# Patient Record
Sex: FEMALE | Race: WHITE | NOT HISPANIC OR LATINO | Employment: OTHER | URBAN - METROPOLITAN AREA
[De-identification: names, ages, dates, MRNs, and addresses within clinical notes are randomized per-mention and may not be internally consistent; named-entity substitution may affect disease eponyms.]

---

## 2017-05-23 DIAGNOSIS — M54.5 LOW BACK PAIN, UNSPECIFIED BACK PAIN LATERALITY, UNSPECIFIED CHRONICITY, WITH SCIATICA PRESENCE UNSPECIFIED: ICD-10-CM

## 2017-05-23 NOTE — TELEPHONE ENCOUNTER
Gabapentin- note on last rx that labs needed for further refills      Last Written Prescription Date: 12/14/2016  Last Quantity: 180, # refills: 0  Last Office Visit with G, P or [facility] prescribing provider: 2/23/2016       Creatinine   Date Value Ref Range Status   09/13/2016 0.67 0.52 - 1.04 mg/dL Final     Lab Results   Component Value Date    AST 16 09/13/2016     Lab Results   Component Value Date    ALT 17 09/13/2016     BP Readings from Last 3 Encounters:   08/04/16 110/62   07/07/16 100/62   06/16/16 120/74

## 2017-05-24 RX ORDER — GABAPENTIN 300 MG/1
CAPSULE ORAL
Qty: 180 CAPSULE | Refills: 0 | Status: SHIPPED | OUTPATIENT
Start: 2017-05-24 | End: 2017-12-04

## 2017-06-03 ENCOUNTER — APPOINTMENT (OUTPATIENT)
Dept: CT IMAGING | Facility: CLINIC | Age: 77
End: 2017-06-03
Attending: FAMILY MEDICINE
Payer: COMMERCIAL

## 2017-06-03 ENCOUNTER — HOSPITAL ENCOUNTER (EMERGENCY)
Facility: CLINIC | Age: 77
Discharge: HOME | End: 2017-06-03
Attending: FAMILY MEDICINE | Admitting: FAMILY MEDICINE
Payer: COMMERCIAL

## 2017-06-03 VITALS
BODY MASS INDEX: 21.16 KG/M2 | RESPIRATION RATE: 14 BRPM | TEMPERATURE: 98.6 F | SYSTOLIC BLOOD PRESSURE: 108 MMHG | WEIGHT: 112 LBS | DIASTOLIC BLOOD PRESSURE: 70 MMHG | HEART RATE: 79 BPM | OXYGEN SATURATION: 96 %

## 2017-06-03 DIAGNOSIS — H81.10 BPPV (BENIGN PAROXYSMAL POSITIONAL VERTIGO), UNSPECIFIED LATERALITY: ICD-10-CM

## 2017-06-03 PROCEDURE — 99284 EMERGENCY DEPT VISIT MOD MDM: CPT | Mod: 25 | Performed by: FAMILY MEDICINE

## 2017-06-03 PROCEDURE — 70450 CT HEAD/BRAIN W/O DYE: CPT

## 2017-06-03 PROCEDURE — 99282 EMERGENCY DEPT VISIT SF MDM: CPT | Mod: Z6 | Performed by: FAMILY MEDICINE

## 2017-06-03 RX ORDER — MECLIZINE HYDROCHLORIDE 25 MG/1
25 TABLET ORAL EVERY 6 HOURS PRN
Qty: 15 TABLET | Refills: 1 | Status: SHIPPED | OUTPATIENT
Start: 2017-06-03 | End: 2017-06-22

## 2017-06-03 ASSESSMENT — ENCOUNTER SYMPTOMS
FEVER: 0
DIZZINESS: 1
SLEEP DISTURBANCE: 1
NAUSEA: 1
DIARRHEA: 0
RHINORRHEA: 0
VOMITING: 0

## 2017-06-03 NOTE — ED AVS SNAPSHOT
[facility] Emergency Department    [address] [place] [address]    Phone:  [phone]    Fax:  [phone]                                       Coby Damon   MRN: [id number]    Department:  [facility] Emergency Department   Date of Visit:  6/3/2017           Patient Information     Date Of Birth          1940        Your diagnoses for this visit were:     BPPV (benign paroxysmal positional vertigo), unspecified laterality        You were seen by Justin Munoz MD.      Follow-up Information     Follow up with Bandar Will MD. Schedule an appointment as soon as possible for a visit in 5 days.    Specialty:  Family Practice    Why:  If not improving.    Contact information:    [facility]  [address] [place] [id number] [phone]          Discharge Instructions         Benign Paroxysmal Positional Vertigo  Benign paroxysmal positional vertigo (BPPV) is a problem with the inner ear. The inner ear contains the vestibular system. This system is what helps you keep your balance. BPPV causes a feeling of spinning. It is a common problem of the vestibular system.  Understanding the vestibular system  The vestibular system of the ear is made up of very tiny parts. They include the utricle, saccule, and semicircular canals. The utricle is a tiny organ that contains calcium crystals. In some people, the crystals can move into the semicircular canals. When this happens, the system no longer works as it should. This causes BPPV. Benign means it is not life-threatening. Paroxysmal means it happens suddenly. Positional means that it happens when you move your head. Vertigo is a feeling of spinning.  What causes BPPV?  Causes include injury to your head or neck. Other problems with the vestibular system may cause BPPV. In many people, the cause of BPPV is not known.  Symptoms of BPPV  You many have repeated feelings of spinning (vertigo).  The vertigo usually lasts less than 1 minute. Some movements, suchas rolling over in bed, can bring on vertigo.  Diagnosing BPPV  Your primary health care provider may diagnose and treat your BPPV. Or you may see an ear, nose, and throat doctor (otolaryngologist). In some cases, you may see a nervous system doctor (neurologist).  The health care provider will ask about your symptoms and your medical history. He or she will examine you. You may have hearing and balance tests. As part of the exam, your health care provider may have you move your head and body in certain ways. If you have BPPV, the movements can bring on vertigo. Your provider will also look for abnormal movements of your eyes. You may have other tests to check your vestibular or nervous systems.  Treatment for BPPV  Your health care provider may try to move the calcium crystals. This is done by having you move your head and neck in certain ways. This treatment is safe and often works well. You may also be told to do these movements at home. You may still have vertigo for a few weeks. Your health care provider will recheck your symptoms, usually in about a month. Special physical therapy may also be part of treatment.  In rare cases surgery may be needed for BPPV that does not go away.     When to call the health care provider  Call your health care provider right away if you have any of these:    Symptoms that do not go away with treatment    Symptoms that get worse    New symptoms        5656-9180 [employer]. [address] [place] [address]. All rights reserved. This information is not intended as a substitute for professional medical care. Always follow your healthcare professional's instructions.          24 Hour Appointment Hotline       To make an appointment at any [facility], call [phone] ([phone]). If you don't have a family doctor or clinic, we will help you find one. [facility] are  conveniently located to serve the needs of you and your family.             Review of your medicines      START taking        Dose / Directions Last dose taken    meclizine 25 MG tablet   Commonly known as:  ANTIVERT   Dose:  25 mg   Quantity:  15 tablet        Take 1 tablet (25 mg) by mouth every 6 hours as needed for dizziness   Refills:  1          Our records show that you are taking the medicines listed below. If these are incorrect, please call your family doctor or clinic.        Dose / Directions Last dose taken    acetaminophen 325 MG tablet   Commonly known as:  TYLENOL   Dose:  325-650 mg        Take 325-650 mg by mouth every 6 hours as needed   Refills:  0        ALEVE PO   Dose:  220 mg        Take 220 mg by mouth   Refills:  0        gabapentin 300 MG capsule   Commonly known as:  NEURONTIN   Quantity:  180 capsule        TAKE ONE CAPSULE BY MOUTH TWICE DAILY NEEDS  LABS  FOR  FURTHER  REFILLS   Refills:  0        ibuprofen 600 MG tablet   Commonly known as:  ADVIL/MOTRIN        Take by mouth every 6 hours as needed   Refills:  0        lisinopril 10 MG tablet   Commonly known as:  PRINIVIL/ZESTRIL   Quantity:  90 tablet        TAKE ONE TABLET BY MOUTH ONCE DAILY   Refills:  2        MULTIVITAMIN PO        Refills:  0        order for DME   Quantity:  1 Device        Equipment being ordered: med cam boot   Refills:  0        vitamin D 2000 UNITS tablet   Dose:  2000 Units        Take 2,000 Units by mouth daily   Refills:  0                Prescriptions were sent or printed at these locations (1 Prescription)                   [facility] [address] [place] [address] [address]   [address] [facility] [address]    Telephone:  [phone]   Fax:  [phone]   Hours:                  E-Prescribed (1 of 1)         meclizine (ANTIVERT) 25 MG tablet                Procedures and tests performed during your visit     CT Head w/o Contrast      Orders Needing Specimen Collection     None      Pending  "Results     No orders found from 2017 to 2017.            Pending Culture Results     No orders found from 2017 to 2017.            Pending Results Instructions     If you had any lab results that were not finalized at the time of your Discharge, you can call the ED Lab Result RN at [phone]. You will be contacted by this team for any positive Lab results or changes in treatment. The nurses are available 7 days a week from 10A to 6:30P.  You can leave a message 24 hours per day and they will return your call.        Thank you for choosing [facility]       Thank you for choosing [facility] for your care. Our goal is always to provide you with excellent care. Hearing back from our patients is one way we can continue to improve our services. Please take a few minutes to complete the written survey that you may receive in the mail after you visit with us. Thank you!        [employer] Information     [email or web address] lets you send messages to your doctor, view your test results, renew your prescriptions, schedule appointments and more. To sign up, go to [email or web address] . Click on \"Log in\" on the left side of the screen, which will take you to the Welcome page. Then click on \"Sign up Now\" on the right side of the page.     You will be asked to enter the access code listed below, as well as some personal information. Please follow the directions to create your username and password.     Your access code is: [email or web address]  Expires: 2017 11:39 AM     Your access code will  in 90 days. If you need help or a new code, please call your [facility] clinic or [phone].        Care EveryWhere ID     This is your Care EveryWhere ID. This could be used by other organizations to access your [facility] medical records  [id number]        After Visit Summary       This is your record. Keep this with you and show to your community pharmacist(s) and doctor(s) at your next visit.                  "

## 2017-06-03 NOTE — ED AVS SNAPSHOT
[facility] Emergency Department    [address] [place] [address]    Phone:  [phone]    Fax:  [phone]                                       Coby Damon   MRN: [id number]    Department:  [facility] Emergency Department   Date of Visit:  6/3/2017           After Visit Summary Signature Page     I have received my discharge instructions, and my questions have been answered. I have discussed any challenges I see with this plan with the nurse or doctor.    ..........................................................................................................................................  Patient/Patient Representative Signature      ..........................................................................................................................................  Patient Representative Print Name and Relationship to Patient    ..................................................               ................................................  Date                                            Time    ..........................................................................................................................................  Reviewed by Signature/Title    ...................................................              ..............................................  Date                                                            Time

## 2017-06-03 NOTE — DISCHARGE INSTRUCTIONS
Benign Paroxysmal Positional Vertigo  Benign paroxysmal positional vertigo (BPPV) is a problem with the inner ear. The inner ear contains the vestibular system. This system is what helps you keep your balance. BPPV causes a feeling of spinning. It is a common problem of the vestibular system.  Understanding the vestibular system  The vestibular system of the ear is made up of very tiny parts. They include the utricle, saccule, and semicircular canals. The utricle is a tiny organ that contains calcium crystals. In some people, the crystals can move into the semicircular canals. When this happens, the system no longer works as it should. This causes BPPV. Benign means it is not life-threatening. Paroxysmal means it happens suddenly. Positional means that it happens when you move your head. Vertigo is a feeling of spinning.  What causes BPPV?  Causes include injury to your head or neck. Other problems with the vestibular system may cause BPPV. In many people, the cause of BPPV is not known.  Symptoms of BPPV  You many have repeated feelings of spinning (vertigo). The vertigo usually lasts less than 1 minute. Some movements, suchas rolling over in bed, can bring on vertigo.  Diagnosing BPPV  Your primary health care provider may diagnose and treat your BPPV. Or you may see an ear, nose, and throat doctor (otolaryngologist). In some cases, you may see a nervous system doctor (neurologist).  The health care provider will ask about your symptoms and your medical history. He or she will examine you. You may have hearing and balance tests. As part of the exam, your health care provider may have you move your head and body in certain ways. If you have BPPV, the movements can bring on vertigo. Your provider will also look for abnormal movements of your eyes. You may have other tests to check your vestibular or nervous systems.  Treatment for BPPV  Your health care provider may try to move the calcium crystals. This is done  by having you move your head and neck in certain ways. This treatment is safe and often works well. You may also be told to do these movements at home. You may still have vertigo for a few weeks. Your health care provider will recheck your symptoms, usually in about a month. Special physical therapy may also be part of treatment.  In rare cases surgery may be needed for BPPV that does not go away.     When to call the health care provider  Call your health care provider right away if you have any of these:    Symptoms that do not go away with treatment    Symptoms that get worse    New symptoms        [phone] [employer]. [address] [place] [address], [place] [address]. All rights reserved. This information is not intended as a substitute for professional medical care. Always follow your healthcare professional's instructions.

## 2017-06-03 NOTE — ED PROVIDER NOTES
History     Chief Complaint   Patient presents with     Dizziness     The history is provided by the patient.     Coby Damon is a 77 year old female who presents to the emergency department with dizziness.  Patient was hit on the head about a week ago when a lamp fell off a table.  Patient states that she only gets the dizziness during the night when she wakes up. Patient becomes nauseated and feels like the room is spinning when she wakes up at night.  She states that the dizziness goes away after awhile when she gets up. This dizziness started on Tuesday night.  Patient reports that the dizziness has gotten better since Tuesday, she thinks it is because she props herself up in bed at night. Patient denies ringing in ears, cold symptoms, fever, vomiting, and diarrhea.      I have reviewed the Medications, Allergies, Past Medical and Surgical History, and Social History in the Epic system.    Patient Active Problem List   Diagnosis     CARDIOVASCULAR SCREENING; LDL GOAL LESS THAN 130     Hypertension goal BP (blood pressure) < 140/90     Senile osteoporosis     Hyperlipidemia LDL goal <130     Cervicalgia     Low back pain, unspecified back pain laterality, unspecified chronicity, with sciatica presence unspecified     Past Medical History:   Diagnosis Date     Breast cancer (H) 2004    lumpectomy and 33 lymph nodes removed.     Shingles        Past Surgical History:   Procedure Laterality Date     COLONOSCOPY N/A 12/12/2014    Procedure: COLONOSCOPY;  Surgeon: Bandar Guidry MD;  Location:  GI     HYSTERECTOMY, PAP NO LONGER INDICATED       INJECT EPIDURAL CERVICAL N/A 9/23/2015    Procedure: INJECT EPIDURAL CERVICAL;  Surgeon: Sawyer Webster MD;  Location:  OR       No family history on file.    Social History   Substance Use Topics     Smoking status: Never Smoker     Smokeless tobacco: Never Used     Alcohol use No        Immunization History   Administered Date(s) Administered     Influenza  (High Dose) 3 valent vaccine 10/22/2013, 09/29/2015, 11/16/2016     Influenza Vaccine IM 3yrs+ 4 Valent IIV4 10/07/2014     TDAP Vaccine (Boostrix) 05/23/2016        No Known Allergies    Current Outpatient Prescriptions   Medication Sig Dispense Refill     gabapentin (NEURONTIN) 300 MG capsule TAKE ONE CAPSULE BY MOUTH TWICE DAILY NEEDS  LABS  FOR  FURTHER  REFILLS 180 capsule 0     lisinopril (PRINIVIL/ZESTRIL) 10 MG tablet TAKE ONE TABLET BY MOUTH ONCE DAILY 90 tablet 2     Naproxen Sodium (ALEVE PO) Take 220 mg by mouth       Cholecalciferol (VITAMIN D) 2000 UNITS tablet Take 2,000 Units by mouth daily       ibuprofen (ADVIL,MOTRIN) 600 MG tablet Take by mouth every 6 hours as needed       acetaminophen (TYLENOL) 325 MG tablet Take 325-650 mg by mouth every 6 hours as needed       Multiple Vitamins-Minerals (MULTIVITAMIN OR)        order for DME Equipment being ordered: med cam boot 1 Device 0       Review of Systems   Constitutional: Negative for fever.   HENT: Negative for congestion, ear pain (no ringing in ears) and rhinorrhea.    Gastrointestinal: Positive for nausea (at night). Negative for diarrhea and vomiting.   Neurological: Positive for dizziness (at night).   Psychiatric/Behavioral: Positive for sleep disturbance.   All other systems reviewed and are negative.      Physical Exam   BP: 122/55  Pulse: 79  Temp: 98.6  F (37  C)  Resp: 14  Weight: 50.8 kg (112 lb)  SpO2: 96 %  Physical Exam   Constitutional: She is oriented to person, place, and time. She appears well-developed and well-nourished.   HENT:   Head: Normocephalic and atraumatic.   Mouth/Throat: Oropharynx is clear and moist.   Eyes: Conjunctivae and EOM are normal.   Neck: Normal range of motion. Neck supple.   Musculoskeletal: Normal range of motion. She exhibits no edema.   Neurological: She is alert and oriented to person, place, and time.   Adonis-pik test negative.   Skin: Skin is warm and dry.   Psychiatric: She has a normal mood and  affect. Her behavior is normal.   Nursing note and vitals reviewed.      ED Course     ED Course     Procedures         Results for orders placed or performed during the hospital encounter of 06/03/17   CT Head w/o Contrast    Narrative    CT HEAD W/O CONTRAST  6/3/2017 11:05 AM    HISTORY:  dizziness, recent head injury    Radiation dose for this scan was reduced using automated exposure  control, adjustment of the mA and/or kV according to patient size, or  iterative reconstruction technique.    FINDINGS:  There is moderate cortical atrophy. There is no evidence of  acute intracranial hemorrhage. There is no mass effect or shift of the  midline. There is no definite CT evidence of acute stroke. The  visualized portions of the paranasal sinuses are clear. The osseous  calvarium appears within normal limits as imaged transversely.      Impression    IMPRESSION:  No evidence of acute intracranial hemorrhage or mass  effect.    JAE SIMMONS MD     CT scan of the head was negative.  I think she most likely has this dizziness from a possible inner ear cause.  This would explain why seems to be very positional.  I am unsure if the head trauma caused otolith to dislodge which is causing this vertigo like symptoms.  Many have patient try some meclizine at night to see if this slowly clears up on its own.  The meclizine should help the symptoms.  If there's no improvement by next week I will have her see her doctor, they may want to consider referral for physical therapy for the eply maneuver.    Assessments & Plan (with Medical Decision Making)  BPPV     I have reviewed the nursing notes.    I have reviewed the findings, diagnosis, plan and need for follow up with the patient.          This document serves as a record of services personally performed by Justin Munoz M.D. It was created on their behalf by Stuart Kim, a trained medical scribe. The creation of this record is based on the provider's personal  observations and the statements of the patient. This document has been checked and approved by the attending provider.    Note: Chart documentation done in part with Dragon Voice Recognition software. Although reviewed after completion, some word and grammatical errors may remain.    6/3/2017   [facility] EMERGENCY DEPARTMENT     Justin Munoz MD  06/03/17 1123

## 2017-06-07 ENCOUNTER — TELEPHONE (OUTPATIENT)
Dept: FAMILY MEDICINE | Facility: CLINIC | Age: 77
End: 2017-06-07

## 2017-06-07 NOTE — TELEPHONE ENCOUNTER
Per  please Triage. He states that she doesn't need to be seen for follow up cause we can set her up with PT and refills on med. Without being seen.  Marjorie Auguste MA

## 2017-06-07 NOTE — TELEPHONE ENCOUNTER
Patient reports that she is feeling better with the dramamine, but reports it causes her to be very drowsy.  She is wondering if there is something else she can take that will help with the dizziness and vertigo that will not make her so drowsy.  She is comfortable not following up in clinic, but is requesting new medication.     Jessica Ruvalcaba RN

## 2017-06-07 NOTE — TELEPHONE ENCOUNTER
Reason for Call:  Same Day Appointment, Requested Provider:  Dr. Will    PCP: Bandar Will    Reason for visit: work in-ED follow up, states she needs to be seen tomorrow    Duration of symptoms:     Have you been treated for this in the past? No    Additional comments:     Can we leave a detailed message on this number? YES    Phone number patient can be reached at: Home number on file [phone] (home)    Best Time: any    Call taken on 6/7/2017 at 10:26 AM by Jacqueline Ugarte

## 2017-06-07 NOTE — TELEPHONE ENCOUNTER
Per Dr. Will she can stop the dramamine and get OTC Zyrtec 10mg and take that daily. Patient notified. KB/JUAN ALBERTO

## 2017-06-07 NOTE — TELEPHONE ENCOUNTER
"From ED:  CT scan of the head was negative.  I think she most likely has this dizziness from a possible inner ear cause.  This would explain why seems to be very positional.  I am unsure if the head trauma caused otolith to dislodge which is causing this vertigo like symptoms.  Many have patient try some meclizine at night to see if this slowly clears up on its own.  The meclizine should help the symptoms.  If there's no improvement by next week I will have her see her doctor, they may want to consider referral for physical therapy for the eply maneuver.    RN has attempted to contact this patient by phone to return their call, but there is no response.  Left message to \"call clinic at earliest convenience\".  Will try again later.     Marjorie Gonsalez RN      "

## 2017-06-22 ENCOUNTER — OFFICE VISIT (OUTPATIENT)
Dept: FAMILY MEDICINE | Facility: CLINIC | Age: 77
End: 2017-06-22
Payer: COMMERCIAL

## 2017-06-22 VITALS
OXYGEN SATURATION: 92 % | HEART RATE: 64 BPM | TEMPERATURE: 97.7 F | BODY MASS INDEX: 21.34 KG/M2 | RESPIRATION RATE: 18 BRPM | DIASTOLIC BLOOD PRESSURE: 60 MMHG | SYSTOLIC BLOOD PRESSURE: 110 MMHG | WEIGHT: 113 LBS | HEIGHT: 61 IN

## 2017-06-22 DIAGNOSIS — H81.10 BPPV (BENIGN PAROXYSMAL POSITIONAL VERTIGO), UNSPECIFIED LATERALITY: Primary | ICD-10-CM

## 2017-06-22 DIAGNOSIS — I10 HYPERTENSION GOAL BP (BLOOD PRESSURE) < 140/90: ICD-10-CM

## 2017-06-22 PROCEDURE — 99214 OFFICE O/P EST MOD 30 MIN: CPT | Performed by: FAMILY MEDICINE

## 2017-06-22 RX ORDER — OMEGA-3 FATTY ACIDS/FISH OIL 300-1000MG
1 CAPSULE ORAL DAILY
Qty: 90 CAPSULE | COMMUNITY
Start: 2017-06-22 | End: 2021-01-25

## 2017-06-22 NOTE — PROGRESS NOTES
SUBJECTIVE:                                                    Coby Damon is a 77 year old female who presents to clinic today for the following health issues:      ED/UC Followup:    Facility:  [facility]  Date of visit: 6/3/17  Reason for visit: Vertigo  Current Status: Improved           Problem list and histories reviewed & adjusted, as indicated.  Additional history: as documented        Reviewed and updated as needed this visit by clinical staff       Reviewed and updated as needed this visit by Provider        SUBJECTIVE:  Coby  is a 77 year old female who presents for:  Follow-up of emergency room visit for vertigo. She developed just at night she noticed turning in bed things were spinning. This was about a week after she was moving an end table and a lamp fell over and hit her in the head. She did not lose consciousness. Had a contusion there but went away. In the emergency room CT scan was done of the head showed no acute changes. Diagnosed with positional vertigo. Recommended rest and meclizine. She has a history of hypertension and low back pain and a history of shingles. She does take antihypertensive medication and gabapentin. But has had no changes in these medications recently. She is feeling better.    Past Medical History:   Diagnosis Date     Breast cancer (H) 2004    lumpectomy and 33 lymph nodes removed.     Shingles      Past Surgical History:   Procedure Laterality Date     COLONOSCOPY N/A 12/12/2014    Procedure: COLONOSCOPY;  Surgeon: Bandar Guidry MD;  Location:  GI     HYSTERECTOMY, PAP NO LONGER INDICATED       INJECT EPIDURAL CERVICAL N/A 9/23/2015    Procedure: INJECT EPIDURAL CERVICAL;  Surgeon: Sawyer Webster MD;  Location:  OR     Social History   Substance Use Topics     Smoking status: Never Smoker     Smokeless tobacco: Never Used     Alcohol use No     Current Outpatient Prescriptions   Medication Sig Dispense Refill     omega 3 1000 MG CAPS Take  "1 g by mouth daily 90 capsule      gabapentin (NEURONTIN) 300 MG capsule TAKE ONE CAPSULE BY MOUTH TWICE DAILY NEEDS  LABS  FOR  FURTHER  REFILLS 180 capsule 0     lisinopril (PRINIVIL/ZESTRIL) 10 MG tablet TAKE ONE TABLET BY MOUTH ONCE DAILY 90 tablet 2     Naproxen Sodium (ALEVE PO) Take 220 mg by mouth       order for DME Equipment being ordered: med cam boot 1 Device 0     Cholecalciferol (VITAMIN D) 2000 UNITS tablet Take 2,000 Units by mouth daily       ibuprofen (ADVIL,MOTRIN) 600 MG tablet Take by mouth every 6 hours as needed       acetaminophen (TYLENOL) 325 MG tablet Take 325-650 mg by mouth every 6 hours as needed       Multiple Vitamins-Minerals (MULTIVITAMIN OR)          REVIEW OF SYSTEMS:   5 point ROS negative except as noted above in HPI, including Gen., Resp, CV, GI &  system review.     OBJECTIVE:  Vitals: /60  Pulse 64  Temp 97.7  F (36.5  C) (Temporal)  Resp 18  Ht 5' 1\" (1.549 m)  Wt 113 lb (51.3 kg)  SpO2 92%  BMI 21.35 kg/m2  BMI= Body mass index is 21.35 kg/(m^2).  She is alert and oriented. In no distress. Her head is normocephalic. Eyes PERRLA. EOMs full no nystagmus. Ears are clear. Her neck with no carotid bruits. Lungs are clear. Heart was a regular rhythm. DTRs are 2 over 5 at the knees. Romberg is fairly steady. Gait is regular. Speech is regular. Face is symmetrical.    ASSESSMENT:  #1 vertigo #2 hypertension    PLAN:  Her blood pressure is good. CT scan of the head showed no acute changes. Use meclizine on a when necessary basis she does not like the side effects. She is getting better. Question of having her see physical therapy was entertained. I printed out printouts on positional vertigo and labyrinthitis for her to review for better understanding. We will do no further workup at this time.        Bandar Will MD  [facility]              "

## 2017-06-22 NOTE — NURSING NOTE
"Chief Complaint   Patient presents with     Hospital F/U       Initial /60  Pulse 64  Temp 97.7  F (36.5  C) (Temporal)  Resp 18  Ht 5' 1\" (1.549 m)  Wt 113 lb (51.3 kg)  SpO2 92%  BMI 21.35 kg/m2 Estimated body mass index is 21.35 kg/(m^2) as calculated from the following:    Height as of this encounter: 5' 1\" (1.549 m).    Weight as of this encounter: 113 lb (51.3 kg).  Medication Reconciliation: complete    "

## 2017-06-22 NOTE — MR AVS SNAPSHOT
"              After Visit Summary   2017    Coby Damon    MRN: [id number]           Patient Information     Date Of Birth          1940        Visit Information        Provider Department      2017 8:20 AM Bandar Will MD [facility]        Today's Diagnoses     BPPV (benign paroxysmal positional vertigo), unspecified laterality    -  1    Hypertension goal BP (blood pressure) < 140/90           Follow-ups after your visit        Who to contact     If you have questions or need follow up information about today's clinic visit or your schedule please contact [facility] directly at [phone].  Normal or non-critical lab and imaging results will be communicated to you by MyChart, letter or phone within 4 business days after the clinic has received the results. If you do not hear from us within 7 days, please contact the clinic through MyChart or phone. If you have a critical or abnormal lab result, we will notify you by phone as soon as possible.  Submit refill requests through Seeder or call your pharmacy and they will forward the refill request to us. Please allow 3 business days for your refill to be completed.          Additional Information About Your Visit        MyChart Information     Seeder lets you send messages to your doctor, view your test results, renew your prescriptions, schedule appointments and more. To sign up, go to [email or web address] . Click on \"Log in\" on the left side of the screen, which will take you to the Welcome page. Then click on \"Sign up Now\" on the right side of the page.     You will be asked to enter the access code listed below, as well as some personal information. Please follow the directions to create your username and password.     Your access code is: [email or web address]  Expires: 2017 11:39 AM     Your access code will  in 90 days. If you need help or a new code, please call your [facility] or " "[phone].        Care EveryWhere ID     This is your Care EveryWhere ID. This could be used by other organizations to access your [place] medical records  [id number]        Your Vitals Were     Pulse Temperature Respirations Height Pulse Oximetry BMI (Body Mass Index)    64 97.7  F (36.5  C) (Temporal) 18 5' 1\" (1.549 m) 92% 21.35 kg/m2       Blood Pressure from Last 3 Encounters:   06/22/17 110/60   06/03/17 108/70   08/04/16 110/62    Weight from Last 3 Encounters:   06/22/17 113 lb (51.3 kg)   06/03/17 112 lb (50.8 kg)   08/04/16 113 lb (51.3 kg)              Today, you had the following     No orders found for display       Primary Care Provider Office Phone # Fax #    Bandar Will [phone]       [place] [id number] [place] [address]        Equal Access to Services     HARRY MARTINEZ : Hadii aad ku hadasho Soomaali, waaxda luqadaha, qaybta kaalmada adeegyada, waxay idiin hayaan fidel abel . So [facility] [phone].    ATENCIÓN: Si habla español, tiene a nicholas disposición servicios gratuitos de asistencia lingüística. Llame al [phone].    We comply with applicable federal civil rights laws and [place] laws. We do not discriminate on the basis of race, color, national origin, age, disability sex, sexual orientation or gender identity.            Thank you!     Thank you for choosing [facility]  for your care. Our goal is always to provide you with excellent care. Hearing back from our patients is one way we can continue to improve our services. Please take a few minutes to complete the written survey that you may receive in the mail after your visit with us. Thank you!             Your Updated Medication List - Protect others around you: Learn how to safely use, store and throw away your medicines at www.disposemymeds.org.          This list is accurate as of: 6/22/17  9:00 AM.  Always use your most recent med list.             "       Brand Name Dispense Instructions for use Diagnosis    acetaminophen 325 MG tablet    TYLENOL     Take 325-650 mg by mouth every 6 hours as needed        ALEVE PO      Take 220 mg by mouth    Fracture of ankle, right, closed, initial encounter       gabapentin 300 MG capsule    NEURONTIN    180 capsule    TAKE ONE CAPSULE BY MOUTH TWICE DAILY NEEDS  LABS  FOR  FURTHER  REFILLS    Low back pain, unspecified back pain laterality, unspecified chronicity, with sciatica presence unspecified       ibuprofen 600 MG tablet    ADVIL/MOTRIN     Take by mouth every 6 hours as needed        lisinopril 10 MG tablet    PRINIVIL/ZESTRIL    90 tablet    TAKE ONE TABLET BY MOUTH ONCE DAILY    Hypertension goal BP (blood pressure) < 140/90       MULTIVITAMIN PO           omega 3 1000 MG Caps     90 capsule    Take 1 g by mouth daily        order for DME     1 Device    Equipment being ordered: med cam boot    Fracture of ankle, right, closed, initial encounter       vitamin D 2000 UNITS tablet      Take 2,000 Units by mouth daily

## 2017-09-12 ENCOUNTER — TELEPHONE (OUTPATIENT)
Dept: FAMILY MEDICINE | Facility: CLINIC | Age: 77
End: 2017-09-12

## 2017-09-12 DIAGNOSIS — I10 HYPERTENSION GOAL BP (BLOOD PRESSURE) < 140/90: ICD-10-CM

## 2017-09-12 NOTE — TELEPHONE ENCOUNTER
Lisinopril      Last Written Prescription Date: 12/13/2016  Last Fill Quantity: 90, # refills: 2  Last Office Visit with [facility], [facility] or [facility] prescribing provider: 6-22-17       Potassium   Date Value Ref Range Status   09/13/2016 4.2 3.4 - 5.3 mmol/L Final     Creatinine   Date Value Ref Range Status   09/13/2016 0.67 0.52 - 1.04 mg/dL Final     BP Readings from Last 3 Encounters:   06/22/17 110/60   06/03/17 108/70   08/04/16 110/62

## 2017-09-13 RX ORDER — LISINOPRIL 10 MG/1
10 TABLET ORAL DAILY
Qty: 30 TABLET | Refills: 0 | Status: SHIPPED | OUTPATIENT
Start: 2017-09-13 | End: 2017-09-22

## 2017-09-13 NOTE — TELEPHONE ENCOUNTER
Rx refilled per RN protocol.  1 month    Will forward to schedulers to schedule patient for OV.  Marjorie Gonsalez RN

## 2017-09-13 NOTE — TELEPHONE ENCOUNTER
Patient is out of this medication.  Please refill as soon as possible and let her know when this has been done.

## 2017-09-22 ENCOUNTER — OFFICE VISIT (OUTPATIENT)
Dept: FAMILY MEDICINE | Facility: CLINIC | Age: 77
End: 2017-09-22
Payer: COMMERCIAL

## 2017-09-22 VITALS
HEART RATE: 70 BPM | TEMPERATURE: 97.2 F | DIASTOLIC BLOOD PRESSURE: 56 MMHG | SYSTOLIC BLOOD PRESSURE: 100 MMHG | BODY MASS INDEX: 21.12 KG/M2 | WEIGHT: 111.8 LBS

## 2017-09-22 DIAGNOSIS — E78.5 HYPERLIPIDEMIA LDL GOAL <130: ICD-10-CM

## 2017-09-22 DIAGNOSIS — I10 HYPERTENSION GOAL BP (BLOOD PRESSURE) < 140/90: Primary | ICD-10-CM

## 2017-09-22 DIAGNOSIS — M54.5 LOW BACK PAIN, UNSPECIFIED BACK PAIN LATERALITY, UNSPECIFIED CHRONICITY, WITH SCIATICA PRESENCE UNSPECIFIED: ICD-10-CM

## 2017-09-22 PROBLEM — Z71.89 ADVANCED DIRECTIVES, COUNSELING/DISCUSSION: Status: ACTIVE | Noted: 2017-09-22

## 2017-09-22 LAB
ALBUMIN SERPL-MCNC: 3.9 G/DL (ref 3.4–5)
ALP SERPL-CCNC: 80 U/L (ref 40–150)
ALT SERPL W P-5'-P-CCNC: 18 U/L (ref 0–50)
ANION GAP SERPL CALCULATED.3IONS-SCNC: 9 MMOL/L (ref 3–14)
AST SERPL W P-5'-P-CCNC: 15 U/L (ref 0–45)
BILIRUB SERPL-MCNC: 0.5 MG/DL (ref 0.2–1.3)
BUN SERPL-MCNC: 20 MG/DL (ref 7–30)
CALCIUM SERPL-MCNC: 9.3 MG/DL (ref 8.5–10.1)
CHLORIDE SERPL-SCNC: 103 MMOL/L (ref 94–109)
CHOLEST SERPL-MCNC: 205 MG/DL
CO2 SERPL-SCNC: 29 MMOL/L (ref 20–32)
CREAT SERPL-MCNC: 0.75 MG/DL (ref 0.52–1.04)
GFR SERPL CREATININE-BSD FRML MDRD: 75 ML/MIN/1.7M2
GLUCOSE SERPL-MCNC: 87 MG/DL (ref 70–99)
HDLC SERPL-MCNC: 53 MG/DL
LDLC SERPL CALC-MCNC: 130 MG/DL
NONHDLC SERPL-MCNC: 152 MG/DL
POTASSIUM SERPL-SCNC: 4.4 MMOL/L (ref 3.4–5.3)
PROT SERPL-MCNC: 7.3 G/DL (ref 6.8–8.8)
SODIUM SERPL-SCNC: 141 MMOL/L (ref 133–144)
TRIGL SERPL-MCNC: 110 MG/DL

## 2017-09-22 PROCEDURE — 80053 COMPREHEN METABOLIC PANEL: CPT | Performed by: FAMILY MEDICINE

## 2017-09-22 PROCEDURE — 36415 COLL VENOUS BLD VENIPUNCTURE: CPT | Performed by: FAMILY MEDICINE

## 2017-09-22 PROCEDURE — 80061 LIPID PANEL: CPT | Performed by: FAMILY MEDICINE

## 2017-09-22 PROCEDURE — 99214 OFFICE O/P EST MOD 30 MIN: CPT | Performed by: FAMILY MEDICINE

## 2017-09-22 RX ORDER — LISINOPRIL 10 MG/1
10 TABLET ORAL DAILY
Qty: 90 TABLET | Refills: 3 | Status: SHIPPED | OUTPATIENT
Start: 2017-09-22 | End: 2018-10-11

## 2017-09-22 NOTE — PROGRESS NOTES
SUBJECTIVE:   Coby Damon is a 77 year old female who presents to clinic today for the following health issues:      Hypertension Follow-up      Outpatient blood pressures are not being checked.    Low Salt Diet: no added salt- very seldom, and not much        Amount of exercise or physical activity: yoga 2 times weekly    Problems taking medications regularly: No    Medication side effects: none  Diet: regular (no restrictions)      Medication Followup of gabapentn    Taking Medication as prescribed: yes    Side Effects:  None    Medication Helping Symptoms:  yes         Problem list and histories reviewed & adjusted, as indicated.  Additional history:         Reviewed and updated as needed this visit by clinical staff     Reviewed and updated as needed this visit by Provider        SUBJECTIVE:  Coby  is a 77 year old female who presents for:  Follow-up on her hypertension needs her lipids checked and is on gabapentin for low back pain with radiculopathy. She actually ran out of her blood pressure pills before they would refill them. Her blood pressure has been doing well. They only gave her 30 pills and she said they are different shape and size and was curious about that. Feeling fine and she had an episode of vertigo this summer that was quite disturbing.    Past Medical History:   Diagnosis Date     Breast cancer (H) 2004    lumpectomy and 33 lymph nodes removed.     Shingles      Past Surgical History:   Procedure Laterality Date     COLONOSCOPY N/A 12/12/2014    Procedure: COLONOSCOPY;  Surgeon: Bandar Guidry MD;  Location:  GI     HYSTERECTOMY, PAP NO LONGER INDICATED       INJECT EPIDURAL CERVICAL N/A 9/23/2015    Procedure: INJECT EPIDURAL CERVICAL;  Surgeon: Sawyer Webster MD;  Location:  OR     Social History   Substance Use Topics     Smoking status: Never Smoker     Smokeless tobacco: Never Used     Alcohol use No     Current Outpatient Prescriptions   Medication Sig Dispense  Refill     lisinopril (PRINIVIL/ZESTRIL) 10 MG tablet Take 1 tablet (10 mg) by mouth daily Appointment needed for additional refills. 30 tablet 0     omega 3 1000 MG CAPS Take 1 g by mouth daily 90 capsule      gabapentin (NEURONTIN) 300 MG capsule TAKE ONE CAPSULE BY MOUTH TWICE DAILY NEEDS  LABS  FOR  FURTHER  REFILLS (Patient taking differently: TAKE ONE CAPSULE BY MOUTH  DAILY NEEDS  LABS  FOR  FURTHER  REFILLS) 180 capsule 0     Naproxen Sodium (ALEVE PO) Take 220 mg by mouth       order for DME Equipment being ordered: med cam boot 1 Device 0     Cholecalciferol (VITAMIN D) 2000 UNITS tablet Take 2,000 Units by mouth daily       acetaminophen (TYLENOL) 325 MG tablet Take 325-650 mg by mouth every 6 hours as needed       Multiple Vitamins-Minerals (MULTIVITAMIN OR)        ibuprofen (ADVIL,MOTRIN) 600 MG tablet Take 200 mg by mouth every 6 hours as needed 2 tablets as needed         REVIEW OF SYSTEMS:   5 point ROS negative except as noted above in HPI, including Gen., Resp, CV, GI &  system review.     OBJECTIVE:  Vitals: /56  Pulse 70  Temp 97.2  F (36.2  C) (Tympanic)  Wt 111 lb 12.8 oz (50.7 kg)  BMI 21.12 kg/m2  BMI= Body mass index is 21.12 kg/(m^2).  She is well-appearing alert and oriented. Eyes PERRLA. Throat clear. Neck supple no thyromegaly no adenopathy. Lungs are clear to auscultation. Heart regular rhythm no murmur. Extremities with no edema. Skin clear.    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 She is not due for a gabapentin yet she is just taking it once at bedtime and feels this is helping. When it comes due we'll refill it. She is getting chemistry panel done today and a lipid panel and will notify her with the results and discuss use of any lipid-lowering medication if need be.            Bandar Will MD  [facility]

## 2017-09-22 NOTE — MR AVS SNAPSHOT
"              After Visit Summary   2017    Coby Damon    MRN: [id number]           Patient Information     Date Of Birth          1940        Visit Information        Provider Department      2017 8:40 AM Bandar Will MD [facility]        Today's Diagnoses     Hypertension goal BP (blood pressure) < 140/90    -  1    Hyperlipidemia LDL goal <130        Low back pain, unspecified back pain laterality, unspecified chronicity, with sciatica presence unspecified           Follow-ups after your visit        Who to contact     If you have questions or need follow up information about today's clinic visit or your schedule please contact [facility] directly at [phone].  Normal or non-critical lab and imaging results will be communicated to you by MyChart, letter or phone within 4 business days after the clinic has received the results. If you do not hear from us within 7 days, please contact the clinic through MyChart or phone. If you have a critical or abnormal lab result, we will notify you by phone as soon as possible.  Submit refill requests through Krowder or call your pharmacy and they will forward the refill request to us. Please allow 3 business days for your refill to be completed.          Additional Information About Your Visit        MyChart Information     Krowder lets you send messages to your doctor, view your test results, renew your prescriptions, schedule appointments and more. To sign up, go to [email or web address] . Click on \"Log in\" on the left side of the screen, which will take you to the Welcome page. Then click on \"Sign up Now\" on the right side of the page.     You will be asked to enter the access code listed below, as well as some personal information. Please follow the directions to create your username and password.     Your access code is: [email or web address]  Expires: 2017  9:25 AM     Your access code will  in 90 " days. If you need help or a new code, please call your [place] clinic or [phone].        Care EveryWhere ID     This is your Care EveryWhere ID. This could be used by other organizations to access your [place] medical records  [id number]        Your Vitals Were     Pulse Temperature BMI (Body Mass Index)             70 97.2  F (36.2  C) (Tympanic) 21.12 kg/m2          Blood Pressure from Last 3 Encounters:   09/22/17 100/56   06/22/17 110/60   06/03/17 108/70    Weight from Last 3 Encounters:   09/22/17 111 lb 12.8 oz (50.7 kg)   06/22/17 113 lb (51.3 kg)   06/03/17 112 lb (50.8 kg)              We Performed the Following     Comprehensive metabolic panel (BMP + Alb, Alk Phos, ALT, AST, Total. Bili, TP)     Lipid Profile          Today's Medication Changes          These changes are accurate as of: 9/22/17  9:25 AM.  If you have any questions, ask your nurse or doctor.               These medicines have changed or have updated prescriptions.        Dose/Directions    gabapentin 300 MG capsule   Commonly known as:  NEURONTIN   This may have changed:  additional instructions   Used for:  Low back pain, unspecified back pain laterality, unspecified chronicity, with sciatica presence unspecified        TAKE ONE CAPSULE BY MOUTH TWICE DAILY NEEDS  LABS  FOR  FURTHER  REFILLS   Quantity:  180 capsule   Refills:  0       lisinopril 10 MG tablet   Commonly known as:  PRINIVIL/ZESTRIL   This may have changed:  additional instructions   Used for:  Hypertension goal BP (blood pressure) < 140/90   Changed by:  Bandar Will MD        Dose:  10 mg   Take 1 tablet (10 mg) by mouth daily   Quantity:  90 tablet   Refills:  3            Where to get your medicines      These medications were sent to [facility] [address] [place] [address] [address]  [address] [facility] [address]     Phone:  [phone]     lisinopril 10 MG tablet                Primary Care Provider Office Phone # Fax #    Bandar Will  [phone]       [facility] [address] [place] [address]        Equal Access to Services     HARRY MARTINEZ : Hadii aad ku hadnancyletitia Tanjajaya, arnieserina kleinwilianha, elaina kaluli ramires, marxia calein hayaalucas bondswilton arroyo colten worthy. So [facility] [phone].    ATENCIÓN: Si habla español, tiene a nicholas disposición servicios gratuitos de asistencia lingüística. Llame al [phone].    We comply with applicable federal civil rights laws and [place] laws. We do not discriminate on the basis of race, color, national origin, age, disability sex, sexual orientation or gender identity.            Thank you!     Thank you for choosing [facility]  for your care. Our goal is always to provide you with excellent care. Hearing back from our patients is one way we can continue to improve our services. Please take a few minutes to complete the written survey that you may receive in the mail after your visit with us. Thank you!             Your Updated Medication List - Protect others around you: Learn how to safely use, store and throw away your medicines at www.disposemymeds.org.          This list is accurate as of: 9/22/17  9:25 AM.  Always use your most recent med list.                   Brand Name Dispense Instructions for use Diagnosis    acetaminophen 325 MG tablet    TYLENOL     Take 325-650 mg by mouth every 6 hours as needed        ALEVE PO      Take 220 mg by mouth    Fracture of ankle, right, closed, initial encounter       gabapentin 300 MG capsule    NEURONTIN    180 capsule    TAKE ONE CAPSULE BY MOUTH TWICE DAILY NEEDS  LABS  FOR  FURTHER  REFILLS    Low back pain, unspecified back pain laterality, unspecified chronicity, with sciatica presence unspecified       ibuprofen 600 MG tablet    ADVIL/MOTRIN     Take 200 mg by mouth every 6 hours as needed 2 tablets as needed        lisinopril 10 MG tablet    PRINIVIL/ZESTRIL    90 tablet    Take 1 tablet (10 mg) by  mouth daily    Hypertension goal BP (blood pressure) < 140/90       MULTIVITAMIN PO           omega 3 1000 MG Caps     90 capsule    Take 1 g by mouth daily        order for DME     1 Device    Equipment being ordered: med cam boot    Fracture of ankle, right, closed, initial encounter       vitamin D 2000 UNITS tablet      Take 2,000 Units by mouth daily

## 2017-09-22 NOTE — NURSING NOTE
"Chief Complaint   Patient presents with     Hypertension     Flu Shot       Initial There were no vitals taken for this visit. Estimated body mass index is 21.35 kg/(m^2) as calculated from the following:    Height as of 6/22/17: 5' 1\" (1.549 m).    Weight as of 6/22/17: 113 lb (51.3 kg).  Medication Reconciliation: complete  "

## 2017-09-28 NOTE — PROGRESS NOTES
Chemistry panel including blood sugar and liver function and kidney function tests were all normal  cholesterol was 205 with a LDL of 130 and HDL 53. These are okay just borderline up

## 2017-10-09 ENCOUNTER — DOCUMENTATION ONLY (OUTPATIENT)
Dept: OTHER | Facility: CLINIC | Age: 77
End: 2017-10-09

## 2017-10-09 PROBLEM — Z71.89 ADVANCED DIRECTIVES, COUNSELING/DISCUSSION: Chronic | Status: ACTIVE | Noted: 2017-09-22

## 2017-10-13 ENCOUNTER — ALLIED HEALTH/NURSE VISIT (OUTPATIENT)
Dept: FAMILY MEDICINE | Facility: CLINIC | Age: 77
End: 2017-10-13
Payer: COMMERCIAL

## 2017-10-13 DIAGNOSIS — Z23 NEED FOR PROPHYLACTIC VACCINATION AND INOCULATION AGAINST INFLUENZA: Primary | ICD-10-CM

## 2017-10-13 PROCEDURE — 90662 IIV NO PRSV INCREASED AG IM: CPT

## 2017-10-13 PROCEDURE — 90471 IMMUNIZATION ADMIN: CPT

## 2017-10-13 PROCEDURE — 99207 ZZC NO CHARGE NURSE ONLY: CPT

## 2017-10-13 NOTE — MR AVS SNAPSHOT
"              After Visit Summary   10/13/2017    Coby Damon    MRN: [id number]           Patient Information     Date Of Birth          1940        Visit Information        Provider Department      10/13/2017 1:15 PM NL FLOAT TEAM D [facility]        Today's Diagnoses     Need for prophylactic vaccination and inoculation against influenza    -  1       Follow-ups after your visit        Who to contact     If you have questions or need follow up information about today's clinic visit or your schedule please contact [facility] directly at [phone].  Normal or non-critical lab and imaging results will be communicated to you by [employer], letter or phone within 4 business days after the clinic has received the results. If you do not hear from us within 7 days, please contact the clinic through [employer] or phone. If you have a critical or abnormal lab result, we will notify you by phone as soon as possible.  Submit refill requests through [facility] or call your pharmacy and they will forward the refill request to us. Please allow 3 business days for your refill to be completed.          Additional Information About Your Visit        MyChart Information     [facility] lets you send messages to your doctor, view your test results, renew your prescriptions, schedule appointments and more. To sign up, go to [email or web address] . Click on \"Log in\" on the left side of the screen, which will take you to the Welcome page. Then click on \"Sign up Now\" on the right side of the page.     You will be asked to enter the access code listed below, as well as some personal information. Please follow the directions to create your username and password.     Your access code is: [email or web address]  Expires: 2017  9:25 AM     Your access code will  in 90 days. If you need help or a new code, please call your [facility] or [phone].        Care EveryWhere ID     This is your " Care EveryWhere ID. This could be used by other organizations to access your [facility] medical records  [id number]         Blood Pressure from Last 3 Encounters:   09/22/17 100/56   06/22/17 110/60   06/03/17 108/70    Weight from Last 3 Encounters:   09/22/17 111 lb 12.8 oz (50.7 kg)   06/22/17 113 lb (51.3 kg)   06/03/17 112 lb (50.8 kg)              We Performed the Following     FLU VACCINE, INCREASED ANTIGEN, PRESV FREE, AGE 65+ [29492]     Vaccine Administration, Initial [41869]          Today's Medication Changes          These changes are accurate as of: 10/13/17  1:42 PM.  If you have any questions, ask your nurse or doctor.               These medicines have changed or have updated prescriptions.        Dose/Directions    gabapentin 300 MG capsule   Commonly known as:  NEURONTIN   This may have changed:  additional instructions   Used for:  Low back pain, unspecified back pain laterality, unspecified chronicity, with sciatica presence unspecified        TAKE ONE CAPSULE BY MOUTH TWICE DAILY NEEDS  LABS  FOR  FURTHER  REFILLS   Quantity:  180 capsule   Refills:  0                Primary Care Provider Office Phone # Fax #    Bandar Will [phone]       [facility] [address] [place] [address]        Equal Access to Services     HARRY MARTINEZ AH: Hadii ervin ku hadasho Soomaali, waaxda luqadaha, qaybta kaalmada adeegyada, waxay rhianna worthy. So [facility] [phone].    ATENCIÓN: Si habla español, tiene a nicholas disposición servicios gratuitos de asistencia lingüística. Ella al [phone].    We comply with applicable federal civil rights laws and [place] laws. We do not discriminate on the basis of race, color, national origin, age, disability, sex, sexual orientation, or gender identity.            Thank you!     Thank you for choosing [facility]  for your care. Our goal is always to provide you with excellent care. Hearing  back from our patients is one way we can continue to improve our services. Please take a few minutes to complete the written survey that you may receive in the mail after your visit with us. Thank you!             Your Updated Medication List - Protect others around you: Learn how to safely use, store and throw away your medicines at www.disposemymeds.org.          This list is accurate as of: 10/13/17  1:42 PM.  Always use your most recent med list.                   Brand Name Dispense Instructions for use Diagnosis    acetaminophen 325 MG tablet    TYLENOL     Take 325-650 mg by mouth every 6 hours as needed        ALEVE PO      Take 220 mg by mouth    Fracture of ankle, right, closed, initial encounter       gabapentin 300 MG capsule    NEURONTIN    180 capsule    TAKE ONE CAPSULE BY MOUTH TWICE DAILY NEEDS  LABS  FOR  FURTHER  REFILLS    Low back pain, unspecified back pain laterality, unspecified chronicity, with sciatica presence unspecified       ibuprofen 600 MG tablet    ADVIL/MOTRIN     Take 200 mg by mouth every 6 hours as needed 2 tablets as needed        lisinopril 10 MG tablet    PRINIVIL/ZESTRIL    90 tablet    Take 1 tablet (10 mg) by mouth daily    Hypertension goal BP (blood pressure) < 140/90       MULTIVITAMIN PO           omega 3 1000 MG Caps     90 capsule    Take 1 g by mouth daily        order for DME     1 Device    Equipment being ordered: med cam boot    Fracture of ankle, right, closed, initial encounter       vitamin D 2000 UNITS tablet      Take 2,000 Units by mouth daily

## 2017-10-13 NOTE — PROGRESS NOTES
Injectable Influenza Immunization Documentation    1.  Is the person to be vaccinated sick today?   No    2. Does the person to be vaccinated have an allergy to a component   of the vaccine?   No    3. Has the person to be vaccinated ever had a serious reaction   to influenza vaccine in the past?   No    4. Has the person to be vaccinated ever had Guillain-Barré syndrome?   No    Form completed by Pat Ch [facility]

## 2017-11-21 ENCOUNTER — HOSPITAL ENCOUNTER (OUTPATIENT)
Dept: MAMMOGRAPHY | Facility: CLINIC | Age: 77
Discharge: HOME | End: 2017-11-21
Attending: FAMILY MEDICINE | Admitting: FAMILY MEDICINE
Payer: COMMERCIAL

## 2017-11-21 DIAGNOSIS — Z12.31 VISIT FOR SCREENING MAMMOGRAM: ICD-10-CM

## 2017-11-21 PROCEDURE — G0202 SCR MAMMO BI INCL CAD: HCPCS

## 2017-12-04 DIAGNOSIS — M54.5 LOW BACK PAIN, UNSPECIFIED BACK PAIN LATERALITY, UNSPECIFIED CHRONICITY, WITH SCIATICA PRESENCE UNSPECIFIED: ICD-10-CM

## 2017-12-04 RX ORDER — GABAPENTIN 300 MG/1
CAPSULE ORAL
Qty: 180 CAPSULE | Refills: 0 | Status: SHIPPED | OUTPATIENT
Start: 2017-12-04 | End: 2018-06-15

## 2017-12-04 NOTE — TELEPHONE ENCOUNTER
gabapentin (NEURONTIN) 300 MG capsule      Last Written Prescription Date:  5/24/17  Last Fill Quantity: 180,   # refills: 0  Last Office Visit: 9/22/17  Future Office visit:       Routing refill request to provider for review/approval because:  Drug not on the [facility], P or [facility] refill protocol or controlled substance

## 2018-04-23 ENCOUNTER — OFFICE VISIT (OUTPATIENT)
Dept: FAMILY MEDICINE | Facility: CLINIC | Age: 78
End: 2018-04-23
Payer: COMMERCIAL

## 2018-04-23 VITALS
RESPIRATION RATE: 16 BRPM | DIASTOLIC BLOOD PRESSURE: 66 MMHG | SYSTOLIC BLOOD PRESSURE: 102 MMHG | OXYGEN SATURATION: 96 % | WEIGHT: 113.25 LBS | BODY MASS INDEX: 22.23 KG/M2 | HEART RATE: 91 BPM | HEIGHT: 60 IN | TEMPERATURE: 99.2 F

## 2018-04-23 DIAGNOSIS — Z23 NEED FOR VACCINATION: Primary | ICD-10-CM

## 2018-04-23 DIAGNOSIS — M79.2 RADICULAR PAIN IN RIGHT ARM: ICD-10-CM

## 2018-04-23 DIAGNOSIS — M25.511 ACUTE PAIN OF RIGHT SHOULDER: ICD-10-CM

## 2018-04-23 PROCEDURE — 90670 PCV13 VACCINE IM: CPT | Performed by: FAMILY MEDICINE

## 2018-04-23 PROCEDURE — 90471 IMMUNIZATION ADMIN: CPT | Performed by: FAMILY MEDICINE

## 2018-04-23 PROCEDURE — 99213 OFFICE O/P EST LOW 20 MIN: CPT | Mod: 25 | Performed by: FAMILY MEDICINE

## 2018-04-23 ASSESSMENT — PAIN SCALES - GENERAL: PAINLEVEL: MILD PAIN (2)

## 2018-04-23 NOTE — NURSING NOTE
Светлана Herring, [facility]  Prior to injection verified patient identity using patient's name and date of birth.   Patient instructed to remain in clinic for 20 minutes afterwards and to report any adverse reaction to me immediately.

## 2018-04-23 NOTE — NURSING NOTE
Chief Complaint   Patient presents with     Shoulder Pain     rt off and on since Friday. Some numbness and tingling. If moving certain ways it causes excruciating pain. Pain right on the shoulder blade. No injury.       Initial /66  Pulse 91  Temp 99.2  F (37.3  C) (Tympanic)  Resp 16  Ht 5' (1.524 m)  Wt 113 lb 4 oz (51.4 kg)  SpO2 96%  BMI 22.12 kg/m2 Estimated body mass index is 22.12 kg/(m^2) as calculated from the following:    Height as of this encounter: 5' (1.524 m).    Weight as of this encounter: 113 lb 4 oz (51.4 kg).  Medication Reconciliation: complete   Health Maintenance Due   Topic Date Due     PNEUMOCOCCAL (1 of 2 - PCV13) 01/08/2006   '  Светлана Herring, [facility]

## 2018-04-24 NOTE — PROGRESS NOTES
Subjective:  Patient is here today with points of right shoulder pain.  She states that last evening she felt a sudden sharp pain in her left scapular region.  It radiated into her upper arm.  She tried some ice therapy which seemed to make it somewhat better but when she went to bed she put a heating pad on it.  Stated that the pain actually went down her arm into her fingertips for a while.  She did not recall any injury.  She has not been doing anything out of the ordinary worse she has been using the arm more.  I did tell her that with symptoms and pain radiating down her arm more concerned about neck issues at which time she stated that she has had significant problems with her neck and she has had epidural steroid injections in her neck and facet injections.  She has not noticed any numbness tingling or weakness in her upper extremity on the right.  No other neuro complaints at this time.    Objective:  Neck: Full range of motion regards to extension flexion and rotation of her chin to both shoulders.  Extension to the right shoulder and rotation to the right shoulder seem to cause a little bit of pain in her scapular region and in her shoulder region.    Right upper extremity exam: Patient has full range of motion in the right shoulder in regards to flexion extension internal and external rotation.  All range of motion was tested against resistance I was unable to elicit the pain that she experienced last evening.  No ligamentous laxity was noted.    Assessment:  Right shoulder pain with radicular symptoms into the right arm    Plan:  Patient will continue her ice and heat therapy as directed.  We will start her on ibuprofen 400 mg 3 times daily with food.  If this does not improve her symptoms consideration may be given to a trial of oral steroids if that does not improve her symptoms I would refer her back to the spine surgeons who have been taking care of her neck issues as this may be the cause of her  symptoms.     Imtiaz Alexandra MD

## 2018-06-15 DIAGNOSIS — M54.5 LOW BACK PAIN, UNSPECIFIED BACK PAIN LATERALITY, UNSPECIFIED CHRONICITY, WITH SCIATICA PRESENCE UNSPECIFIED: ICD-10-CM

## 2018-06-15 NOTE — TELEPHONE ENCOUNTER
Requested Prescriptions   Pending Prescriptions Disp Refills     gabapentin (NEURONTIN) 300 MG capsule [Pharmacy Med Name: GABAPENTIN 300MG    CAP] 180 capsule 0     Sig: TAKE ONE CAPSULE BY MOUTH TWICE DAILY **NEEDS  LABS  FOR  FURTHER  REFILLS**    There is no refill protocol information for this order        Last Written Prescription Date:  12/04/2017  Last Fill Quantity: 180,  # refills: 0   Last office visit: 4/23/2018 with prescribing provider:  04/23/2018   Future Office Visit:

## 2018-06-18 RX ORDER — GABAPENTIN 300 MG/1
CAPSULE ORAL
Qty: 180 CAPSULE | Refills: 0 | Status: SHIPPED | OUTPATIENT
Start: 2018-06-18 | End: 2018-11-20

## 2018-10-02 ENCOUNTER — TRANSFERRED RECORDS (OUTPATIENT)
Dept: HEALTH INFORMATION MANAGEMENT | Facility: CLINIC | Age: 78
End: 2018-10-02

## 2018-10-11 DIAGNOSIS — I10 HYPERTENSION GOAL BP (BLOOD PRESSURE) < 140/90: ICD-10-CM

## 2018-10-15 RX ORDER — LISINOPRIL 10 MG/1
10 TABLET ORAL DAILY
Qty: 30 TABLET | Refills: 0 | Status: SHIPPED | OUTPATIENT
Start: 2018-10-15 | End: 2018-11-09

## 2018-10-15 NOTE — TELEPHONE ENCOUNTER
"Requested Prescriptions   Pending Prescriptions Disp Refills     lisinopril (PRINIVIL/ZESTRIL) 10 MG tablet [Pharmacy Med Name: LISINOPRIL 10MG  TAB] 90 tablet 3     Sig: TAKE ONE TABLET BY MOUTH ONCE DAILY    ACE Inhibitors (Including Combos) Protocol Failed    10/11/2018  8:46 PM       Failed - Normal serum creatinine on file in past 12 months    Recent Labs   Lab Test  09/22/17   0916   CR  0.75            Failed - Normal serum potassium on file in past 12 months    Recent Labs   Lab Test  09/22/17   0916   POTASSIUM  4.4            Passed - Blood pressure under 140/90 in past 12 months    BP Readings from Last 3 Encounters:   04/23/18 102/66   09/22/17 100/56   06/22/17 110/60                Passed - Recent (12 mo) or future (30 days) visit within the authorizing provider's specialty    Patient had office visit in the last 12 months or has a visit in the next 30 days with authorizing provider or within the authorizing provider's specialty.  See \"Patient Info\" tab in inbasket, or \"Choose Columns\" in Meds & Orders section of the refill encounter.           Passed - Patient is age 18 or older       Passed - No active pregnancy on record       Passed - No positive pregnancy test in past 12 months        Last Written Prescription Date:  9/22/17  Last Fill Quantity: 90,  # refills: 3   Last office visit: 4/23/2018 with prescribing provider:  9/22/17 for this Dx   Future Office Visit:   Next 5 appointments (look out 90 days)     Nov 09, 2018  1:00 PM CST   Pre-Op physical with Bandar Will MD   [facility]    [address] [place] [id number] [phone]                 Rx refilled per RN protocol.   1 month  To get to next appointment.  MITCH MorrisN, RN    "

## 2018-11-09 ENCOUNTER — OFFICE VISIT (OUTPATIENT)
Dept: FAMILY MEDICINE | Facility: CLINIC | Age: 78
End: 2018-11-09
Payer: COMMERCIAL

## 2018-11-09 VITALS
RESPIRATION RATE: 16 BRPM | SYSTOLIC BLOOD PRESSURE: 106 MMHG | WEIGHT: 112.9 LBS | DIASTOLIC BLOOD PRESSURE: 58 MMHG | BODY MASS INDEX: 22.05 KG/M2 | HEART RATE: 74 BPM | OXYGEN SATURATION: 97 % | TEMPERATURE: 98 F

## 2018-11-09 DIAGNOSIS — E78.5 HYPERLIPIDEMIA LDL GOAL <130: ICD-10-CM

## 2018-11-09 DIAGNOSIS — I10 HYPERTENSION GOAL BP (BLOOD PRESSURE) < 140/90: ICD-10-CM

## 2018-11-09 DIAGNOSIS — Z01.818 PREOP GENERAL PHYSICAL EXAM: Primary | ICD-10-CM

## 2018-11-09 DIAGNOSIS — Z23 NEED FOR PROPHYLACTIC VACCINATION AND INOCULATION AGAINST INFLUENZA: ICD-10-CM

## 2018-11-09 DIAGNOSIS — H26.9 CATARACT OF BOTH EYES, UNSPECIFIED CATARACT TYPE: ICD-10-CM

## 2018-11-09 LAB
ALBUMIN SERPL-MCNC: 3.9 G/DL (ref 3.4–5)
ALP SERPL-CCNC: 80 U/L (ref 40–150)
ALT SERPL W P-5'-P-CCNC: 23 U/L (ref 0–50)
ANION GAP SERPL CALCULATED.3IONS-SCNC: 8 MMOL/L (ref 3–14)
AST SERPL W P-5'-P-CCNC: 18 U/L (ref 0–45)
BILIRUB SERPL-MCNC: 0.4 MG/DL (ref 0.2–1.3)
BUN SERPL-MCNC: 15 MG/DL (ref 7–30)
CALCIUM SERPL-MCNC: 9.1 MG/DL (ref 8.5–10.1)
CHLORIDE SERPL-SCNC: 107 MMOL/L (ref 94–109)
CHOLEST SERPL-MCNC: 222 MG/DL
CO2 SERPL-SCNC: 27 MMOL/L (ref 20–32)
CREAT SERPL-MCNC: 0.77 MG/DL (ref 0.52–1.04)
GFR SERPL CREATININE-BSD FRML MDRD: 72 ML/MIN/1.7M2
GLUCOSE SERPL-MCNC: 100 MG/DL (ref 70–99)
HDLC SERPL-MCNC: 53 MG/DL
LDLC SERPL CALC-MCNC: 148 MG/DL
NONHDLC SERPL-MCNC: 169 MG/DL
POTASSIUM SERPL-SCNC: 4.1 MMOL/L (ref 3.4–5.3)
PROT SERPL-MCNC: 7.4 G/DL (ref 6.8–8.8)
SODIUM SERPL-SCNC: 142 MMOL/L (ref 133–144)
TRIGL SERPL-MCNC: 107 MG/DL

## 2018-11-09 PROCEDURE — 99214 OFFICE O/P EST MOD 30 MIN: CPT | Mod: 25 | Performed by: FAMILY MEDICINE

## 2018-11-09 PROCEDURE — 80053 COMPREHEN METABOLIC PANEL: CPT | Performed by: FAMILY MEDICINE

## 2018-11-09 PROCEDURE — 90662 IIV NO PRSV INCREASED AG IM: CPT | Performed by: FAMILY MEDICINE

## 2018-11-09 PROCEDURE — 36415 COLL VENOUS BLD VENIPUNCTURE: CPT | Performed by: FAMILY MEDICINE

## 2018-11-09 PROCEDURE — G0008 ADMIN INFLUENZA VIRUS VAC: HCPCS | Performed by: FAMILY MEDICINE

## 2018-11-09 PROCEDURE — 80061 LIPID PANEL: CPT | Performed by: FAMILY MEDICINE

## 2018-11-09 RX ORDER — LISINOPRIL 10 MG/1
10 TABLET ORAL DAILY
Qty: 90 TABLET | Refills: 3 | Status: SHIPPED | OUTPATIENT
Start: 2018-11-09 | End: 2019-11-14

## 2018-11-09 NOTE — PROGRESS NOTES
[address] [address]  [phone]  Dept: [phone]    PRE-OP EVALUATION:  Today's date: 2018    Coby Damon (: 1940) presents for pre-operative evaluation assessment as requested by Dr. Ford.  She requires evaluation and anesthesia risk assessment prior to undergoing surgery/procedure for treatment of Cataract .    Proposed Surgery/ Procedure: Cataract  Date of Surgery/ Procedure: 18  Time of Surgery/ Procedure: 9:45  Hospital/Surgical Facility: [facility]    Primary Physician: Bandar Will  Type of Anesthesia Anticipated: Combined     Patient has a Health Care Directive or Living Will:  YES on file    1. NO - Do you have a history of heart attack, stroke, stent, bypass or surgery on an artery in the head, neck, heart or legs?  2. NO - Do you ever have any pain or discomfort in your chest?  3. NO - Do you have a history of  Heart Failure?  4. NO - Are you troubled by shortness of breath when: walking on the level, up a slight hill or at night?  5. NO - Do you currently have a cold, bronchitis or other respiratory infection?  6. NO - Do you have a cough, shortness of breath or wheezing?  7. NO - Do you sometimes get pains in the calves of your legs when you walk?  8. NO - Do you or anyone in your family have previous history of blood clots?  9. NO - Do you or does anyone in your family have a serious bleeding problem such as prolonged bleeding following surgeries or cuts?  10. NO - Have you ever had problems with anemia or been told to take iron pills?  11. NO - Have you had any abnormal blood loss such as black, tarry or bloody stools, or abnormal vaginal bleeding?  12. NO - Have you ever had a blood transfusion?  13. NO - Have you or any of your relatives ever had problems with anesthesia?  14. NO - Do you have sleep apnea, excessive snoring or daytime drowsiness?  15. NO - Do you have any prosthetic heart  valves?  16. NO - Do you have prosthetic joints?  17. NO - Is there any chance that you may be pregnant?      HPI:     HPI related to upcoming procedure: Cataracts bilaterally.          MEDICAL HISTORY:     Patient Active Problem List    Diagnosis Date Noted     Advance Care Planning 09/22/2017     Priority: Medium     Low back pain, unspecified back pain laterality, unspecified chronicity, with sciatica presence unspecified 12/14/2016     Priority: Medium     Cervicalgia 08/28/2015     Priority: Medium     Senile osteoporosis 12/10/2014     Priority: Medium     Hyperlipidemia LDL goal <130 12/10/2014     Priority: Medium     CARDIOVASCULAR SCREENING; LDL GOAL LESS THAN 130 09/18/2013     Priority: Medium     Hypertension goal BP (blood pressure) < 140/90 09/18/2013     Priority: Medium      Past Medical History:   Diagnosis Date     Breast cancer (H) 2004    lumpectomy and 33 lymph nodes removed.     Shingles      Past Surgical History:   Procedure Laterality Date     COLONOSCOPY N/A 12/12/2014    Procedure: COLONOSCOPY;  Surgeon: Bandar Guidry MD;  Location:  GI     HYSTERECTOMY, PAP NO LONGER INDICATED       INJECT EPIDURAL CERVICAL N/A 9/23/2015    Procedure: INJECT EPIDURAL CERVICAL;  Surgeon: Sawyer Webster MD;  Location:  OR     Current Outpatient Prescriptions   Medication Sig Dispense Refill     acetaminophen (TYLENOL) 325 MG tablet Take 325-650 mg by mouth every 6 hours as needed       Cholecalciferol (VITAMIN D) 2000 UNITS tablet Take 2,000 Units by mouth daily       gabapentin (NEURONTIN) 300 MG capsule TAKE ONE CAPSULE BY MOUTH TWICE DAILY **NEEDS  LABS  FOR  FURTHER  REFILLS** 180 capsule 0     lisinopril (PRINIVIL/ZESTRIL) 10 MG tablet Take 1 tablet (10 mg) by mouth daily Appointment needed for additional refills. 30 tablet 0     Multiple Vitamins-Minerals (MULTIVITAMIN OR)        omega 3 1000 MG CAPS Take 1 g by mouth daily 90 capsule      OTC products: None, except as noted above    No  Known Allergies   Latex Allergy: NO    Social History   Substance Use Topics     Smoking status: Never Smoker     Smokeless tobacco: Never Used     Alcohol use No     History   Drug Use No       REVIEW OF SYSTEMS:   CONSTITUTIONAL: NEGATIVE for fever, chills, change in weight  ENT/MOUTH: NEGATIVE for ear, mouth and throat problems  RESP: NEGATIVE for significant cough or SOB  CV: NEGATIVE for chest pain, palpitations or peripheral edema  GI: NEGATIVE for nausea, abdominal pain, heartburn, or change in bowel habits  MUSCULOSKELETAL: NEGATIVE for significant arthralgias or myalgia    EXAM:   There were no vitals taken for this visit.    GENERAL APPEARANCE: healthy, alert and no distress     EYES: EOMI, PERRL     HENT: ear canals and TM's normal and nose and mouth without ulcers or lesions     NECK: no adenopathy, no asymmetry, masses, or scars and thyroid normal to palpation     RESP: lungs clear to auscultation - no rales, rhonchi or wheezes     CV: regular rates and rhythm, normal S1 S2, no S3 or S4 and no murmur, click or rub     ABDOMEN:  soft, nontender, no HSM or masses and bowel sounds normal     MS: extremities normal- no gross deformities noted, no evidence of inflammation in joints, FROM in all extremities.     SKIN: no suspicious lesions or rashes     NEURO: Normal strength and tone, sensory exam grossly normal, mentation intact and speech normal     PSYCH: mentation appears normal. and affect normal/bright     LYMPHATICS: No cervical adenopathy    DIAGNOSTICS:   No labs or EKG required for low risk surgery (cataract, skin procedure, breast biopsy, etc)    Recent Labs   Lab Test  09/22/17   0916  09/13/16   0939   06/04/14   1032  03/06/13   HGB   --    --    --   13.6   --    --    PLT   --    --    --   310   --    --    NA  141  137   < >  144   < >  139   POTASSIUM  4.4  4.2   < >  4.6   < >  4.8   CR  0.75  0.67   < >  0.88   < >  0.84   A1C   --    --    --    --    --   5.5    < > = values in this  interval not displayed.        IMPRESSION:   Reason for surgery/procedure: Cataracts    The proposed surgical procedure is considered LOW risk.    REVISED CARDIAC RISK INDEX  The patient has the following serious cardiovascular risks for perioperative complications such as (MI, PE, VFib and 3  AV Block):  No serious cardiac risks  INTERPRETATION: 0 risks: Class I (very low risk - 0.4% complication rate)    The patient has the following additional risks for perioperative complications:  No identified additional risks      ICD-10-CM    1. Preop general physical exam Z01.818        RECOMMENDATIONS:             APPROVAL GIVEN to proceed with proposed procedure, without further diagnostic evaluation       Signed Electronically by: Bandar Will MD    Copy of this evaluation report is provided to requesting physician.    [facility] Preop Guidelines    Revised Cardiac Risk Index    Injectable Influenza Immunization Documentation    1.  Is the person to be vaccinated sick today?   No    2. Does the person to be vaccinated have an allergy to a component   of the vaccine?   No  Egg Allergy Algorithm Link    3. Has the person to be vaccinated ever had a serious reaction   to influenza vaccine in the past?   No    4. Has the person to be vaccinated ever had Guillain-Barré syndrome?   No    Form completed by Nicol Fisher MA

## 2018-11-09 NOTE — MR AVS SNAPSHOT
After Visit Summary   11/9/2018    Coby Damon    MRN: [id number]           Patient Information     Date Of Birth          1940        Visit Information        Provider Department      11/9/2018 9:20 AM Bandar Will MD [facility]        Today's Diagnoses     Preop general physical exam    -  1    Cataract of both eyes, unspecified cataract type        Hypertension goal BP (blood pressure) < 140/90        Hyperlipidemia LDL goal <130        Need for prophylactic vaccination and inoculation against influenza          Care Instructions      Before Your Surgery      Call your surgeon if there is any change in your health. This includes signs of a cold or flu (such as a sore throat, runny nose, cough, rash or fever).    Do not smoke, drink alcohol or take over the counter medicine (unless your surgeon or primary care doctor tells you to) for the 24 hours before and after surgery.    If you take prescribed drugs: Follow your doctor s orders about which medicines to take and which to stop until after surgery.    Eating and drinking prior to surgery: follow the instructions from your surgeon    Take a shower or bath the night before surgery. Use the soap your surgeon gave you to gently clean your skin. If you do not have soap from your surgeon, use your regular soap. Do not shave or scrub the surgery site.  Wear clean pajamas and have clean sheets on your bed.           Follow-ups after your visit        Your next 10 appointments already scheduled     Nov 15, 2018   Procedure with Rian Ford MD   [facility] ([facility])    [address] [place] [address]   [phone]           From [address]: Exit at [address] on south side of [place]. Turn right on [address]. Turn left at stoplight on [address]. [facility] will be in view two blocks ahead            Nov 29, 2018   Procedure with Rian  Dane Ford MD   [facility] ([facility])    [address]   [place] [id number] [phone]           From [address]: Exit at [address] on south side of [place]. Turn right on [address]. Turn left at stoplight on [address]. [facility] will be in view two blocks ahead              Who to contact     If you have questions or need follow up information about today's clinic visit or your schedule please contact [facility] directly at [phone].  Normal or non-critical lab and imaging results will be communicated to you by MyChart, letter or phone within 4 business days after the clinic has received the results. If you do not hear from us within 7 days, please contact the clinic through MyChart or phone. If you have a critical or abnormal lab result, we will notify you by phone as soon as possible.  Submit refill requests through "[employer]" or call your pharmacy and they will forward the refill request to us. Please allow 3 business days for your refill to be completed.          Additional Information About Your Visit        Care EveryWhere ID     This is your Care EveryWhere ID. This could be used by other organizations to access your [facility] medical records  [id number]        Your Vitals Were     Pulse Temperature Respirations Pulse Oximetry BMI (Body Mass Index)       74 98  F (36.7  C) (Temporal) 16 97% 22.05 kg/m2        Blood Pressure from Last 3 Encounters:   11/09/18 106/58   04/23/18 102/66   09/22/17 100/56    Weight from Last 3 Encounters:   11/09/18 112 lb 14.4 oz (51.2 kg)   04/23/18 113 lb 4 oz (51.4 kg)   09/22/17 111 lb 12.8 oz (50.7 kg)              We Performed the Following     Comprehensive metabolic panel (BMP + Alb, Alk Phos, ALT, AST, Total. Bili, TP)     FLU VACCINE, INCREASED ANTIGEN, PRESV FREE, AGE 65+ [50317]     Lipid Profile     Vaccine Administration, Initial [90820]          Today's  Medication Changes          These changes are accurate as of 11/9/18 10:16 AM.  If you have any questions, ask your nurse or doctor.               These medicines have changed or have updated prescriptions.        Dose/Directions    lisinopril 10 MG tablet   Commonly known as:  PRINIVIL/ZESTRIL   This may have changed:  additional instructions   Used for:  Hypertension goal BP (blood pressure) < 140/90   Changed by:  Bandar Will MD        Dose:  10 mg   Take 1 tablet (10 mg) by mouth daily   Quantity:  90 tablet   Refills:  3            Where to get your medicines      These medications were sent to [facility] [address] [place] [address] [address]  [address], [facility] [address]     Phone:  [phone]     lisinopril 10 MG tablet                Primary Care Provider Office Phone # Fax #    Bandar Will [phone] [address] [address]        Equal Access to Services     [facility]: Hadii aad ku hadasho Soomaali, waaxda luqadaha, qaybta kaalmada adeegyada, waxay idiin hayaan fidel kharaaraceli abel . So [facility] [phone].    ATENCIÓN: Si habla español, tiene a nicholas disposición servicios gratuitos de asistencia lingüística. [facility] [phone].    We comply with applicable federal civil rights laws and [place] laws. We do not discriminate on the basis of race, color, national origin, age, disability, sex, sexual orientation, or gender identity.            Thank you!     Thank you for choosing [facility]  for your care. Our goal is always to provide you with excellent care. Hearing back from our patients is one way we can continue to improve our services. Please take a few minutes to complete the written survey that you may receive in the mail after your visit with us. Thank you!             Your Updated Medication List - Protect others around you: Learn how to safely use, store and throw away your medicines at  www.disposemymeds.org.          This list is accurate as of 11/9/18 10:16 AM.  Always use your most recent med list.                   Brand Name Dispense Instructions for use Diagnosis    acetaminophen 325 MG tablet    TYLENOL     Take 325-650 mg by mouth every 6 hours as needed        gabapentin 300 MG capsule    NEURONTIN    180 capsule    TAKE ONE CAPSULE BY MOUTH TWICE DAILY **NEEDS  LABS  FOR  FURTHER  REFILLS**    Low back pain, unspecified back pain laterality, unspecified chronicity, with sciatica presence unspecified       lisinopril 10 MG tablet    PRINIVIL/ZESTRIL    90 tablet    Take 1 tablet (10 mg) by mouth daily    Hypertension goal BP (blood pressure) < 140/90       MULTIVITAMIN PO           omega 3 1000 MG Caps     90 capsule    Take 1 g by mouth daily        vitamin D 2000 units tablet      Take 2,000 Units by mouth daily

## 2018-11-14 ENCOUNTER — ANESTHESIA EVENT (OUTPATIENT)
Dept: SURGERY | Facility: CLINIC | Age: 78
End: 2018-11-14
Payer: COMMERCIAL

## 2018-11-14 NOTE — H&P (VIEW-ONLY)
[address] [address]  [phone]  Dept: [phone]    PRE-OP EVALUATION:  Today's date: 2018    Coby Damon (: 1940) presents for pre-operative evaluation assessment as requested by Dr. Ford.  She requires evaluation and anesthesia risk assessment prior to undergoing surgery/procedure for treatment of Cataract .    Proposed Surgery/ Procedure: Cataract  Date of Surgery/ Procedure: 18  Time of Surgery/ Procedure: 9:45  Hospital/Surgical Facility: [facility]    Primary Physician: Banadr Will  Type of Anesthesia Anticipated: Combined     Patient has a Health Care Directive or Living Will:  YES on file    1. NO - Do you have a history of heart attack, stroke, stent, bypass or surgery on an artery in the head, neck, heart or legs?  2. NO - Do you ever have any pain or discomfort in your chest?  3. NO - Do you have a history of  Heart Failure?  4. NO - Are you troubled by shortness of breath when: walking on the level, up a slight hill or at night?  5. NO - Do you currently have a cold, bronchitis or other respiratory infection?  6. NO - Do you have a cough, shortness of breath or wheezing?  7. NO - Do you sometimes get pains in the calves of your legs when you walk?  8. NO - Do you or anyone in your family have previous history of blood clots?  9. NO - Do you or does anyone in your family have a serious bleeding problem such as prolonged bleeding following surgeries or cuts?  10. NO - Have you ever had problems with anemia or been told to take iron pills?  11. NO - Have you had any abnormal blood loss such as black, tarry or bloody stools, or abnormal vaginal bleeding?  12. NO - Have you ever had a blood transfusion?  13. NO - Have you or any of your relatives ever had problems with anesthesia?  14. NO - Do you have sleep apnea, excessive snoring or daytime drowsiness?  15. NO - Do you have any prosthetic heart  valves?  16. NO - Do you have prosthetic joints?  17. NO - Is there any chance that you may be pregnant?      HPI:     HPI related to upcoming procedure: Cataracts bilaterally.          MEDICAL HISTORY:     Patient Active Problem List    Diagnosis Date Noted     Advance Care Planning 09/22/2017     Priority: Medium     Low back pain, unspecified back pain laterality, unspecified chronicity, with sciatica presence unspecified 12/14/2016     Priority: Medium     Cervicalgia 08/28/2015     Priority: Medium     Senile osteoporosis 12/10/2014     Priority: Medium     Hyperlipidemia LDL goal <130 12/10/2014     Priority: Medium     CARDIOVASCULAR SCREENING; LDL GOAL LESS THAN 130 09/18/2013     Priority: Medium     Hypertension goal BP (blood pressure) < 140/90 09/18/2013     Priority: Medium      Past Medical History:   Diagnosis Date     Breast cancer (H) 2004    lumpectomy and 33 lymph nodes removed.     Shingles      Past Surgical History:   Procedure Laterality Date     COLONOSCOPY N/A 12/12/2014    Procedure: COLONOSCOPY;  Surgeon: Bandar Guidry MD;  Location:  GI     HYSTERECTOMY, PAP NO LONGER INDICATED       INJECT EPIDURAL CERVICAL N/A 9/23/2015    Procedure: INJECT EPIDURAL CERVICAL;  Surgeon: Sawyer Webster MD;  Location:  OR     Current Outpatient Prescriptions   Medication Sig Dispense Refill     acetaminophen (TYLENOL) 325 MG tablet Take 325-650 mg by mouth every 6 hours as needed       Cholecalciferol (VITAMIN D) 2000 UNITS tablet Take 2,000 Units by mouth daily       gabapentin (NEURONTIN) 300 MG capsule TAKE ONE CAPSULE BY MOUTH TWICE DAILY **NEEDS  LABS  FOR  FURTHER  REFILLS** 180 capsule 0     lisinopril (PRINIVIL/ZESTRIL) 10 MG tablet Take 1 tablet (10 mg) by mouth daily Appointment needed for additional refills. 30 tablet 0     Multiple Vitamins-Minerals (MULTIVITAMIN OR)        omega 3 1000 MG CAPS Take 1 g by mouth daily 90 capsule      OTC products: None, except as noted above    No  Known Allergies   Latex Allergy: NO    Social History   Substance Use Topics     Smoking status: Never Smoker     Smokeless tobacco: Never Used     Alcohol use No     History   Drug Use No       REVIEW OF SYSTEMS:   CONSTITUTIONAL: NEGATIVE for fever, chills, change in weight  ENT/MOUTH: NEGATIVE for ear, mouth and throat problems  RESP: NEGATIVE for significant cough or SOB  CV: NEGATIVE for chest pain, palpitations or peripheral edema  GI: NEGATIVE for nausea, abdominal pain, heartburn, or change in bowel habits  MUSCULOSKELETAL: NEGATIVE for significant arthralgias or myalgia    EXAM:   There were no vitals taken for this visit.    GENERAL APPEARANCE: healthy, alert and no distress     EYES: EOMI, PERRL     HENT: ear canals and TM's normal and nose and mouth without ulcers or lesions     NECK: no adenopathy, no asymmetry, masses, or scars and thyroid normal to palpation     RESP: lungs clear to auscultation - no rales, rhonchi or wheezes     CV: regular rates and rhythm, normal S1 S2, no S3 or S4 and no murmur, click or rub     ABDOMEN:  soft, nontender, no HSM or masses and bowel sounds normal     MS: extremities normal- no gross deformities noted, no evidence of inflammation in joints, FROM in all extremities.     SKIN: no suspicious lesions or rashes     NEURO: Normal strength and tone, sensory exam grossly normal, mentation intact and speech normal     PSYCH: mentation appears normal. and affect normal/bright     LYMPHATICS: No cervical adenopathy    DIAGNOSTICS:   No labs or EKG required for low risk surgery (cataract, skin procedure, breast biopsy, etc)    Recent Labs   Lab Test  09/22/17   0916  09/13/16   0939   06/04/14   1032  03/06/13   HGB   --    --    --   13.6   --    --    PLT   --    --    --   310   --    --    NA  141  137   < >  144   < >  139   POTASSIUM  4.4  4.2   < >  4.6   < >  4.8   CR  0.75  0.67   < >  0.88   < >  0.84   A1C   --    --    --    --    --   5.5    < > = values in this  interval not displayed.        IMPRESSION:   Reason for surgery/procedure: Cataracts    The proposed surgical procedure is considered LOW risk.    REVISED CARDIAC RISK INDEX  The patient has the following serious cardiovascular risks for perioperative complications such as (MI, PE, VFib and 3  AV Block):  No serious cardiac risks  INTERPRETATION: 0 risks: Class I (very low risk - 0.4% complication rate)    The patient has the following additional risks for perioperative complications:  No identified additional risks      ICD-10-CM    1. Preop general physical exam Z01.818        RECOMMENDATIONS:             APPROVAL GIVEN to proceed with proposed procedure, without further diagnostic evaluation       Signed Electronically by: Bandar Will MD    Copy of this evaluation report is provided to requesting physician.    [facility] Preop Guidelines    Revised Cardiac Risk Index    Injectable Influenza Immunization Documentation    1.  Is the person to be vaccinated sick today?   No    2. Does the person to be vaccinated have an allergy to a component   of the vaccine?   No  Egg Allergy Algorithm Link    3. Has the person to be vaccinated ever had a serious reaction   to influenza vaccine in the past?   No    4. Has the person to be vaccinated ever had Guillain-Barré syndrome?   No    Form completed by Nicol Fisher MA

## 2018-11-15 ENCOUNTER — SURGERY (OUTPATIENT)
Age: 78
End: 2018-11-15

## 2018-11-15 ENCOUNTER — ANESTHESIA (OUTPATIENT)
Dept: SURGERY | Facility: CLINIC | Age: 78
End: 2018-11-15
Payer: COMMERCIAL

## 2018-11-15 ENCOUNTER — HOSPITAL ENCOUNTER (OUTPATIENT)
Facility: CLINIC | Age: 78
Discharge: HOME | End: 2018-11-15
Attending: OPHTHALMOLOGY | Admitting: OPHTHALMOLOGY
Payer: COMMERCIAL

## 2018-11-15 VITALS
HEART RATE: 68 BPM | TEMPERATURE: 97.7 F | RESPIRATION RATE: 16 BRPM | OXYGEN SATURATION: 96 % | DIASTOLIC BLOOD PRESSURE: 63 MMHG | SYSTOLIC BLOOD PRESSURE: 111 MMHG

## 2018-11-15 PROCEDURE — 25000125 ZZHC RX 250: Performed by: OPHTHALMOLOGY

## 2018-11-15 PROCEDURE — 71000022 ZZH RECOVERY CATRACT PACKAGE: Performed by: OPHTHALMOLOGY

## 2018-11-15 PROCEDURE — 36000025 ZZH CATARACT SURGICAL PACKAGE: Performed by: OPHTHALMOLOGY

## 2018-11-15 PROCEDURE — 25000128 H RX IP 250 OP 636: Performed by: OPHTHALMOLOGY

## 2018-11-15 PROCEDURE — 25000128 H RX IP 250 OP 636: Performed by: NURSE ANESTHETIST, CERTIFIED REGISTERED

## 2018-11-15 PROCEDURE — 25000125 ZZHC RX 250: Performed by: NURSE ANESTHETIST, CERTIFIED REGISTERED

## 2018-11-15 PROCEDURE — V2632 POST CHMBR INTRAOCULAR LENS: HCPCS | Performed by: OPHTHALMOLOGY

## 2018-11-15 PROCEDURE — 37000012 ZZH ANESTHESIA CATARACT PACKAGE: Performed by: OPHTHALMOLOGY

## 2018-11-15 DEVICE — EYE IMP IOL AMO PCL TECNIS ZCB00 21.5: Type: IMPLANTABLE DEVICE | Site: EYE | Status: FUNCTIONAL

## 2018-11-15 RX ORDER — LIDOCAINE 40 MG/G
CREAM TOPICAL
Status: DISCONTINUED | OUTPATIENT
Start: 2018-11-15 | End: 2018-11-15 | Stop reason: HOSPADM

## 2018-11-15 RX ORDER — ONDANSETRON 2 MG/ML
4 INJECTION INTRAMUSCULAR; INTRAVENOUS EVERY 30 MIN PRN
Status: CANCELLED | OUTPATIENT
Start: 2018-11-15

## 2018-11-15 RX ORDER — PHENYLEPHRINE HYDROCHLORIDE 25 MG/ML
1 SOLUTION/ DROPS OPHTHALMIC
Status: COMPLETED | OUTPATIENT
Start: 2018-11-15 | End: 2018-11-15

## 2018-11-15 RX ORDER — PROPARACAINE HYDROCHLORIDE 5 MG/ML
1 SOLUTION/ DROPS OPHTHALMIC ONCE
Status: COMPLETED | OUTPATIENT
Start: 2018-11-15 | End: 2018-11-15

## 2018-11-15 RX ORDER — KETAMINE HYDROCHLORIDE 10 MG/ML
INJECTION INTRAMUSCULAR; INTRAVENOUS PRN
Status: DISCONTINUED | OUTPATIENT
Start: 2018-11-15 | End: 2018-11-15

## 2018-11-15 RX ORDER — CYCLOPENTOLATE HYDROCHLORIDE 10 MG/ML
1 SOLUTION/ DROPS OPHTHALMIC
Status: COMPLETED | OUTPATIENT
Start: 2018-11-15 | End: 2018-11-15

## 2018-11-15 RX ORDER — LIDOCAINE HYDROCHLORIDE 10 MG/ML
INJECTION, SOLUTION INFILTRATION; PERINEURAL PRN
Status: DISCONTINUED | OUTPATIENT
Start: 2018-11-15 | End: 2018-11-15 | Stop reason: HOSPADM

## 2018-11-15 RX ORDER — NALOXONE HYDROCHLORIDE 0.4 MG/ML
.1-.4 INJECTION, SOLUTION INTRAMUSCULAR; INTRAVENOUS; SUBCUTANEOUS
Status: CANCELLED | OUTPATIENT
Start: 2018-11-15 | End: 2018-11-16

## 2018-11-15 RX ORDER — TROPICAMIDE 10 MG/ML
1 SOLUTION/ DROPS OPHTHALMIC
Status: COMPLETED | OUTPATIENT
Start: 2018-11-15 | End: 2018-11-15

## 2018-11-15 RX ORDER — MEPERIDINE HYDROCHLORIDE 50 MG/ML
12.5 INJECTION INTRAMUSCULAR; INTRAVENOUS; SUBCUTANEOUS
Status: CANCELLED | OUTPATIENT
Start: 2018-11-15

## 2018-11-15 RX ORDER — PROPARACAINE HYDROCHLORIDE 5 MG/ML
1 SOLUTION/ DROPS OPHTHALMIC ONCE
Status: DISCONTINUED | OUTPATIENT
Start: 2018-11-15 | End: 2018-11-15 | Stop reason: HOSPADM

## 2018-11-15 RX ORDER — ONDANSETRON 4 MG/1
4 TABLET, ORALLY DISINTEGRATING ORAL EVERY 30 MIN PRN
Status: CANCELLED | OUTPATIENT
Start: 2018-11-15

## 2018-11-15 RX ORDER — SODIUM CHLORIDE, SODIUM LACTATE, POTASSIUM CHLORIDE, CALCIUM CHLORIDE 600; 310; 30; 20 MG/100ML; MG/100ML; MG/100ML; MG/100ML
INJECTION, SOLUTION INTRAVENOUS CONTINUOUS
Status: CANCELLED | OUTPATIENT
Start: 2018-11-15

## 2018-11-15 RX ADMIN — PHENYLEPHRINE HYDROCHLORIDE 1 DROP: 25 SOLUTION/ DROPS OPHTHALMIC at 11:21

## 2018-11-15 RX ADMIN — EPINEPHRINE 300 ML: 1 INJECTION, SOLUTION INTRAMUSCULAR; SUBCUTANEOUS at 12:22

## 2018-11-15 RX ADMIN — CYCLOPENTOLATE HYDROCHLORIDE 1 DROP: 10 SOLUTION OPHTHALMIC at 11:21

## 2018-11-15 RX ADMIN — Medication 1.4 ML GIVEN: at 12:23

## 2018-11-15 RX ADMIN — TROPICAMIDE 1 DROP: 10 SOLUTION/ DROPS OPHTHALMIC at 11:33

## 2018-11-15 RX ADMIN — MIDAZOLAM 1 MG: 1 INJECTION INTRAMUSCULAR; INTRAVENOUS at 12:12

## 2018-11-15 RX ADMIN — CYCLOPENTOLATE HYDROCHLORIDE 1 DROP: 10 SOLUTION OPHTHALMIC at 11:29

## 2018-11-15 RX ADMIN — TROPICAMIDE 1 DROP: 10 SOLUTION/ DROPS OPHTHALMIC at 11:21

## 2018-11-15 RX ADMIN — PHENYLEPHRINE HYDROCHLORIDE 1 DROP: 25 SOLUTION/ DROPS OPHTHALMIC at 11:33

## 2018-11-15 RX ADMIN — PROPARACAINE HYDROCHLORIDE 1 DROP: 5 SOLUTION/ DROPS OPHTHALMIC at 11:21

## 2018-11-15 RX ADMIN — LIDOCAINE HYDROCHLORIDE 1 ML: 10 INJECTION, SOLUTION EPIDURAL; INFILTRATION; INTRACAUDAL; PERINEURAL at 11:22

## 2018-11-15 RX ADMIN — PHENYLEPHRINE HYDROCHLORIDE 1 DROP: 25 SOLUTION/ DROPS OPHTHALMIC at 11:29

## 2018-11-15 RX ADMIN — CYCLOPENTOLATE HYDROCHLORIDE 1 DROP: 10 SOLUTION OPHTHALMIC at 11:33

## 2018-11-15 RX ADMIN — KETAMINE HCL-NACL SOLN PREF SY 50 MG/5ML-0.9% (10MG/ML) 10 MG: 10 SOLUTION PREFILLED SYRINGE at 12:12

## 2018-11-15 RX ADMIN — TROPICAMIDE 1 DROP: 10 SOLUTION/ DROPS OPHTHALMIC at 11:29

## 2018-11-15 RX ADMIN — LIDOCAINE HYDROCHLORIDE 1 ML: 10 INJECTION, SOLUTION INFILTRATION; PERINEURAL at 12:22

## 2018-11-15 NOTE — IP AVS SNAPSHOT
MRN:[id number]                      After Visit Summary   11/15/2018    Coby Damon    MRN: [id number]           Thank you!     Thank you for choosing [facility] for your care. Our goal is always to provide you with excellent care. Hearing back from our patients is one way we can continue to improve our services. Please take a few minutes to complete the written survey that you may receive in the mail after you visit with us. Thank you!        Patient Information     Date Of Birth          1940        About your hospital stay     You were admitted on:  November 15, 2018 You last received care in the:  [facility] Phase II    You were discharged on:  November 15, 2018       Who to Call     For medical emergencies, please call 911.  For non-urgent questions about your medical care, please call your primary care provider or clinic, [phone]  For questions related to your surgery, please call your surgery clinic        Attending Provider     Provider Specialty    Rian Ford MD Ophthalmology       Primary Care Provider Office Phone # Fax #    Bandar Will [phone]      Your next 10 appointments already scheduled     Nov 23, 2018 11:30 AM CST   MA SCREENING BILATERAL W/ DEISY with PHMA1   [facility] ([facility])    [address] [place] [address] [phone]           How do I prepare for my exam? (Food and drink instructions) No Food and Drink Restrictions.  How do I prepare for my exam? (Other instructions) Do not use any powder, lotion or deodorant under your arms or on your breast. If you do, we will ask you to remove it before your exam.  What should I wear: Wear comfortable, two-piece clothing.  How long does the exam take: Most scans will take 15 minutes.  What should I bring: Bring any previous mammograms from other facilities or have them mailed to the breast center.  Do I need a :  No  " is needed.  What do I need to tell my doctor: If you have any allergies, tell your care team.  What should I do after the exam: No restrictions, You may resume normal activities.  What is this test: This test is an x-ray of the breast to look for breast disease. The breast is pressed between two plates to flatten and spread the tissue. An X-ray is taken of the breast from different angles.  Who should I call with questions: If you have any questions, please call the Imaging Department where you will have your exam. Directions, parking instructions, and other information is available on our website, [email or web address].  Other information about my exam Three-dimensional (3D) mammograms are available at [facility] locations in [facility], [place], and [place]. [facility] locations include [place] and the [facility] [place] in [place].  Benefits of 3D mammograms include: * Improved rate of cancer detection * Decreases your chance of having to go back for more tests, which means fewer: * \"False-positive\" results (This means that there is an abnormal area but it isn't cancer.) * Invasive testing procedures, such as a biopsy or surgery * Can provide clearer images of the breast if you have dense breast tissue.  *3D mammography is an optional exam that anyone can have with a 2D mammogram. It doesn't replace or take the place of a 2D mammogram. 2D mammograms remain an effective screening test for all women.  Not all insurance companies cover the cost of a 3D mammogram. Check with your insurance.            Nov 29, 2018   Procedure with Rian Ford MD   [facility] ([facility])    [address] [place] [address]   [phone]           From [address]: Exit at [address] on south side of [place]. Turn right on [address]. Turn left at stoplight on [address]. [facility] will be " in view two blocks ahead              Further instructions from your care team        POST CATARACT SURGERY EYE INSTRUCTIONS  DR. EID           Eye Medications    If you opted for the one bottle containing all 3 eye medications, follow these instructions.    *Instill one drop into the operative eye 3 times a day until the bottle is empty.     - Wear eye shield while sleeping for 3 days     - Refrain from heavy lifting, bending, straining, or strenuous activity for 1      week.     - Do not rub or push on the operative eye for 1 week (you may wipe the eye lid(s) gently with a wet wash cloth to remove matter from eyelashes).     - You may bathe or shower, but do not get the eye wet for 1 month      (swimming, hot tubs or other water activities).     - Minor itching, scratching sensation, burning sensation, etc. is normal.     Report any severe eye pain or loss of vision.     - Please call our office at [phone] if you have questions or     concerns.    [facility]   Adult Discharge Orders & Instructions     For 24 hours after surgery    1. Get plenty of rest.  A responsible adult must stay with you for at least 24 hours after you leave the hospital.   2. Do not drive or use heavy equipment.  If you have weakness or tingling, don't drive or use heavy equipment until this feeling goes away.  3. Do not drink alcohol.  4. Avoid strenuous or risky activities.  Ask for help when climbing stairs.   5. You may feel lightheaded.  IF so, sit for a few minutes before standing.  Have someone help you get up.   6. If you have nausea (feel sick to your stomach): Drink only clear liquids such as apple juice, ginger ale, broth or 7-Up.  Rest may also help.  Be sure to drink enough fluids.  Move to a regular diet as you feel able.  7. You may have a slight fever. Call the doctor if your fever is over 100 F (37.7 C) (taken under the tongue) or lasts longer than 24 hours.  8. You may have a dry mouth, a sore  throat, muscle aches or trouble sleeping.  These should go away after 24 hours.  9. Do not make important or legal decisions.   Call your doctor for any of the followin.  Signs of infection (fever, growing tenderness at the surgery site, a large amount of drainage or bleeding, severe pain, foul-smelling drainage, redness, swelling).    2. It has been over 8 to 10 hours since surgery and you are still not able to urinate (pass water).    3.  Headache for over 24 hours.    Pending Results     No orders found from 2018 to 2018.            Admission Information     Date & Time Provider Department Dept. Phone    11/15/2018 Rian Ford MD [facility] Phase [phone]      Your Vitals Were     Blood Pressure Pulse Temperature Respirations Pulse Oximetry       121/83 68 97.7  F (36.5  C) (Oral) 16 100%       Care EveryWhere ID     This is your Care EveryWhere ID. This could be used by other organizations to access your [facility] medical records  [id number]        Equal Access to Services     [facility]: Hadii ervin alicea hadasho Soomaali, waaxda luqadaha, qaybta kaalmada adewiltonyaserina, marixa worthy. So [facility] [phone].    ATENCIÓN: Si habla español, tiene a nicholas disposición servicios gratuitos de asistencia lingüística. Llame al [phone].    We comply with applicable federal civil rights laws and [place] laws. We do not discriminate on the basis of race, color, national origin, age, disability, sex, sexual orientation, or gender identity.               Review of your medicines      CONTINUE these medicines which have NOT CHANGED        Dose / Directions    acetaminophen 325 MG tablet   Commonly known as:  TYLENOL        Dose:  325-650 mg   Take 325-650 mg by mouth every 6 hours as needed   Refills:  0       gabapentin 300 MG capsule   Commonly known as:  NEURONTIN   Used for:  Low back pain, unspecified back pain laterality, unspecified chronicity, with  sciatica presence unspecified        TAKE ONE CAPSULE BY MOUTH TWICE DAILY **NEEDS  LABS  FOR  FURTHER  REFILLS**   Quantity:  180 capsule   Refills:  0       IBUPROFEN PO   Indication:  Mild to Moderate Pain        Dose:  400 mg   Take 400 mg by mouth every 6 hours as needed for moderate pain   Refills:  0       lisinopril 10 MG tablet   Commonly known as:  PRINIVIL/ZESTRIL   Used for:  Hypertension goal BP (blood pressure) < 140/90        Dose:  10 mg   Take 1 tablet (10 mg) by mouth daily   Quantity:  90 tablet   Refills:  3       MULTIVITAMIN PO        Refills:  0       omega 3 1000 MG Caps        Dose:  1 g   Take 1 g by mouth daily   Quantity:  90 capsule   Refills:  0       vitamin D 2000 units tablet        Dose:  2000 Units   Take 2,000 Units by mouth daily   Refills:  0                Protect others around you: Learn how to safely use, store and throw away your medicines at www.disposemymeds.org.             Medication List: This is a list of all your medications and when to take them. Check marks below indicate your daily home schedule. Keep this list as a reference.      Medications           Morning Afternoon Evening Bedtime As Needed    acetaminophen 325 MG tablet   Commonly known as:  TYLENOL   Take 325-650 mg by mouth every 6 hours as needed                                gabapentin 300 MG capsule   Commonly known as:  NEURONTIN   TAKE ONE CAPSULE BY MOUTH TWICE DAILY **NEEDS  LABS  FOR  FURTHER  REFILLS**                                IBUPROFEN PO   Take 400 mg by mouth every 6 hours as needed for moderate pain                                lisinopril 10 MG tablet   Commonly known as:  PRINIVIL/ZESTRIL   Take 1 tablet (10 mg) by mouth daily                                MULTIVITAMIN PO                                omega 3 1000 MG Caps   Take 1 g by mouth daily                                vitamin D 2000 units tablet   Take 2,000 Units by mouth daily

## 2018-11-15 NOTE — ANESTHESIA POSTPROCEDURE EVALUATION
Patient: Coby Damon    Procedure(s):  PHACOEMULSIFICATION WITH STANDARD INTRAOCULAR LENS IMPLANT LEFT EYE    Diagnosis:NUCLEAR CATARACT  Diagnosis Additional Information: No value filed.    Anesthesia Type:  MAC    Note:  Anesthesia Post Evaluation    Patient location during evaluation: Phase 2 and Bedside  Patient participation: Able to fully participate in evaluation  Level of consciousness: awake and alert  Pain management: adequate  Airway patency: patent  Cardiovascular status: acceptable  Respiratory status: acceptable  Hydration status: acceptable  PONV: none     Anesthetic complications: None    Comments: Patient was pleased with her care today. No complications noted. Will follow as needed.        Last vitals:  Vitals:    11/15/18 1134   BP: 121/83   Pulse: 68   Resp: 16   Temp: 97.7  F (36.5  C)   SpO2: 100%         Electronically Signed By: SHONDA Puentes CRNA  November 15, 2018  12:50 PM

## 2018-11-15 NOTE — OP NOTE
PREOPERATIVE DIAGNOSIS: Visually significant cataract, Left eye   POSTOPERATIVE DIAGNOSIS: Same   PROCEDURES:   1. Cataract extraction with intraocular lens implant Left eye.  SURGEON: Rian Ford M.D.  INDICATIONS: The patient Coby Damon presented to the eye clinic with decreased vision secondary to cataract in the Left eye. The risks, including, but not limited to infection, loss of vision, loss of eye, need for more surgery, and bleeding, along with the benefits, alternatives, expectations, and the procedure itself were discussed at length with the patient who wished to proceed with surgery. All questions were answered to the patient's satisfaction. The stated procedure is still clinically indicated.   DESCRIPTION OF PROCEDURE:   Prior to the procedure, appropriate cardiac and respiratory monitors were applied to the patient.  In the pre-operative holding area, a drop of topical tetracaine followed by povidone iodine followed by lidocaine gel.  The patient was brought to the operating room where a surgical pause was carried out to identify with all members of the surgical team the correct surgical site.  With adequate anesthesia, the Left eye was prepped and draped in the usual sterile fashion. A lid speculum was placed, and the operating microscope was rotated into position. A paracentesis was created.  Through this limbal paracentesis, the anterior chamber was filled with preservative-free lidocaine followed by viscoelastic.   A temporal clear corneal incision was created at the limbus using a 2.5 mm blade. A capsulorrhexis was initiated using a cystotome and was completed in continuous and circular fashion using the capsulorrhexis forceps. The lens nucleus was hydrodissected using balanced salt solution.  The lens nucleus was rotated and removed using phacoemulsification in a stop and chop technique.  Residual cortical material was removed using irrigation-aspiration.  The capsular bag was  reinflated to its maximal extent with cohesive viscoelastic.  A 21.5 diopter ZCBOO was inserted into the capsular bag and noted to be well centered.  The lens power selected was reviewed using the intraocular lens power measurements that were obtained preoperatively to confirm that the correct lens was selected for the desired post-operative refractive state. The residual viscoelastic was aspirated. The anterior chamber was inflated with balanced salt solution and the wounds were hydrated and found to be self-sealing.  The eye was palpated and found to be of normal physiologic pressure. The lid speculum was removed. One drop of postoperative anti-inflammatory and antibiotic medication was instilled in the eye and a clear shield placed over the eye. The patient tolerated the procedure well and there were no intraoperative complications.      PLAN: The patient will be discharged to home and will follow up tomorrow in the eye clinic.  EBL:  None  Complications:  None    Implant Name Type Inv. Item Serial No.  Lot No. LRB No. Used   EYE IMP IOL CASI PCL TECNIS ZCB00 21.5 Lens/Eye Implant EYE IMP IOL CASI PCL TECNIS ZCB00 21.5 [id number] [employer]   Left 1

## 2018-11-15 NOTE — IP AVS SNAPSHOT
[facility]    [address]     [place] [address]    Phone:  [phone]                                       After Visit Summary   11/15/2018    Coby Damon    MRN: [id number]           After Visit Summary Signature Page     I have received my discharge instructions, and my questions have been answered. I have discussed any challenges I see with this plan with the nurse or doctor.    ..........................................................................................................................................  Patient/Patient Representative Signature      ..........................................................................................................................................  Patient Representative Print Name and Relationship to Patient    ..................................................               ................................................  Date                                   Time    ..........................................................................................................................................  Reviewed by Signature/Title    ...................................................              ..............................................  Date                                               Time          22EPIC Rev 08/18

## 2018-11-15 NOTE — DISCHARGE INSTRUCTIONS
POST CATARACT SURGERY EYE INSTRUCTIONS  DR. EID           Eye Medications    If you opted for the one bottle containing all 3 eye medications, follow these instructions.    *Instill one drop into the operative eye 3 times a day until the bottle is empty.     - Wear eye shield while sleeping for 3 days     - Refrain from heavy lifting, bending, straining, or strenuous activity for 1      week.     - Do not rub or push on the operative eye for 1 week (you may wipe the eye lid(s) gently with a wet wash cloth to remove matter from eyelashes).     - You may bathe or shower, but do not get the eye wet for 1 month      (swimming, hot tubs or other water activities).     - Minor itching, scratching sensation, burning sensation, etc. is normal.     Report any severe eye pain or loss of vision.     - Please call our office at [phone] if you have questions or     concerns.    [facility]   Adult Discharge Orders & Instructions     For 24 hours after surgery    1. Get plenty of rest.  A responsible adult must stay with you for at least 24 hours after you leave the hospital.   2. Do not drive or use heavy equipment.  If you have weakness or tingling, don't drive or use heavy equipment until this feeling goes away.  3. Do not drink alcohol.  4. Avoid strenuous or risky activities.  Ask for help when climbing stairs.   5. You may feel lightheaded.  IF so, sit for a few minutes before standing.  Have someone help you get up.   6. If you have nausea (feel sick to your stomach): Drink only clear liquids such as apple juice, ginger ale, broth or 7-Up.  Rest may also help.  Be sure to drink enough fluids.  Move to a regular diet as you feel able.  7. You may have a slight fever. Call the doctor if your fever is over 100 F (37.7 C) (taken under the tongue) or lasts longer than 24 hours.  8. You may have a dry mouth, a sore throat, muscle aches or trouble sleeping.  These should go away after 24 hours.  9. Do  not make important or legal decisions.   Call your doctor for any of the followin.  Signs of infection (fever, growing tenderness at the surgery site, a large amount of drainage or bleeding, severe pain, foul-smelling drainage, redness, swelling).    2. It has been over 8 to 10 hours since surgery and you are still not able to urinate (pass water).    3.  Headache for over 24 hours.

## 2018-11-15 NOTE — ANESTHESIA CARE TRANSFER NOTE
Patient: Coby Damon    Procedure(s):  PHACOEMULSIFICATION WITH STANDARD INTRAOCULAR LENS IMPLANT LEFT EYE    Diagnosis: NUCLEAR CATARACT  Diagnosis Additional Information: No value filed.    Anesthesia Type:   MAC     Note:  Airway :Nasal Cannula  Patient transferred to:Phase II  Handoff Report: Identifed the Patient, Identified the Reponsible Provider, Reviewed the pertinent medical history, Discussed the surgical course, Reviewed Intra-OP anesthesia mangement and issues during anesthesia, Set expectations for post-procedure period and Allowed opportunity for questions and acknowledgement of understanding      Vitals: (Last set prior to Anesthesia Care Transfer)    CRNA VITALS  11/15/2018 1200 - 11/15/2018 1249      11/15/2018             SpO2: 100 %    Resp Rate (observed): 11                Electronically Signed By: SHONDA Puentes CRNA  November 15, 2018  12:49 PM

## 2018-11-15 NOTE — ANESTHESIA PREPROCEDURE EVALUATION
Anesthesia Evaluation     . Pt has had prior anesthetic. Type: General           ROS/MED HX    ENT/Pulmonary:       Neurologic:  - neg neurologic ROS     Cardiovascular:     (+) hypertension----. : . . . :. . No previous cardiac testing       METS/Exercise Tolerance:     Hematologic:  - neg hematologic  ROS       Musculoskeletal:   (+) arthritis, , , -       GI/Hepatic:  - neg GI/hepatic ROS       Renal/Genitourinary:  - ROS Renal section negative   (+) Pt has no history of transplant,       Endo:  - neg endo ROS       Psychiatric:  - neg psychiatric ROS       Infectious Disease:  - neg infectious disease ROS       Malignancy:      - no malignancy   Other:    (+) No chance of pregnancy C-spine cleared: N/A, no H/O Chronic Pain,no other significant disability   - neg other ROS                 Physical Exam  Normal systems: cardiovascular, pulmonary and dental    Airway   Mallampati: II  TM distance: >3 FB    Dental     Cardiovascular   Rhythm and rate: regular and normal      Pulmonary    breath sounds clear to auscultation                    Anesthesia Plan      History & Physical Review  History and physical reviewed and following examination; no interval change.    ASA Status:  2 .    NPO Status:  > 8 hours    Plan for MAC Reason for MAC:  Procedure to face, neck, head or breast  PONV prophylaxis:  Ondansetron (or other 5HT-3)       Postoperative Care      Consents  Anesthetic plan, risks, benefits and alternatives discussed with:  Patient or representative and Patient.  Use of blood products discussed: No .   .                          .

## 2018-11-19 ENCOUNTER — TELEPHONE (OUTPATIENT)
Dept: FAMILY MEDICINE | Facility: CLINIC | Age: 78
End: 2018-11-19

## 2018-11-19 DIAGNOSIS — E78.5 HYPERLIPIDEMIA LDL GOAL <130: Primary | ICD-10-CM

## 2018-11-19 NOTE — TELEPHONE ENCOUNTER
Pt notified of results. Future order placed. Radha Allen CMA ([facility])    Chem panel normal  Chol up to 222 from 205 last year. Watch diet low fat and repeat in 6-12 months

## 2018-11-20 DIAGNOSIS — M54.5 LOW BACK PAIN, UNSPECIFIED BACK PAIN LATERALITY, UNSPECIFIED CHRONICITY, WITH SCIATICA PRESENCE UNSPECIFIED: ICD-10-CM

## 2018-11-20 RX ORDER — GABAPENTIN 300 MG/1
CAPSULE ORAL
Qty: 180 CAPSULE | Refills: 0 | Status: SHIPPED | OUTPATIENT
Start: 2018-11-20 | End: 2019-05-19

## 2018-11-20 NOTE — TELEPHONE ENCOUNTER
Gabapentin       Last Written Prescription Date:  6/18/18  Last Fill Quantity: 180,   # refills: 0  Last Office Visit: 11/9/18  Future Office visit:       Routing refill request to provider for review/approval because:  Drug not on the [facility] or [facility] refill protocol or controlled substance

## 2018-11-23 ENCOUNTER — HOSPITAL ENCOUNTER (OUTPATIENT)
Dept: MAMMOGRAPHY | Facility: CLINIC | Age: 78
Discharge: HOME | End: 2018-11-23
Attending: FAMILY MEDICINE | Admitting: FAMILY MEDICINE
Payer: COMMERCIAL

## 2018-11-23 DIAGNOSIS — Z12.31 VISIT FOR SCREENING MAMMOGRAM: ICD-10-CM

## 2018-11-23 PROCEDURE — 77067 SCR MAMMO BI INCL CAD: CPT

## 2018-11-28 NOTE — H&P (VIEW-ONLY)
[address] [address]  [phone]  Dept: [phone]    PRE-OP EVALUATION:  Today's date: 2018    Coby Damon (: 1940) presents for pre-operative evaluation assessment as requested by Dr. Ford.  She requires evaluation and anesthesia risk assessment prior to undergoing surgery/procedure for treatment of Cataract .    Proposed Surgery/ Procedure: Cataract  Date of Surgery/ Procedure: 18  Time of Surgery/ Procedure: 9:45  Hospital/Surgical Facility: [facility]    Primary Physician: Bandar Will  Type of Anesthesia Anticipated: Combined     Patient has a Health Care Directive or Living Will:  YES on file    1. NO - Do you have a history of heart attack, stroke, stent, bypass or surgery on an artery in the head, neck, heart or legs?  2. NO - Do you ever have any pain or discomfort in your chest?  3. NO - Do you have a history of  Heart Failure?  4. NO - Are you troubled by shortness of breath when: walking on the level, up a slight hill or at night?  5. NO - Do you currently have a cold, bronchitis or other respiratory infection?  6. NO - Do you have a cough, shortness of breath or wheezing?  7. NO - Do you sometimes get pains in the calves of your legs when you walk?  8. NO - Do you or anyone in your family have previous history of blood clots?  9. NO - Do you or does anyone in your family have a serious bleeding problem such as prolonged bleeding following surgeries or cuts?  10. NO - Have you ever had problems with anemia or been told to take iron pills?  11. NO - Have you had any abnormal blood loss such as black, tarry or bloody stools, or abnormal vaginal bleeding?  12. NO - Have you ever had a blood transfusion?  13. NO - Have you or any of your relatives ever had problems with anesthesia?  14. NO - Do you have sleep apnea, excessive snoring or daytime drowsiness?  15. NO - Do you have any prosthetic heart  valves?  16. NO - Do you have prosthetic joints?  17. NO - Is there any chance that you may be pregnant?      HPI:     HPI related to upcoming procedure: Cataracts bilaterally.          MEDICAL HISTORY:     Patient Active Problem List    Diagnosis Date Noted     Advance Care Planning 09/22/2017     Priority: Medium     Low back pain, unspecified back pain laterality, unspecified chronicity, with sciatica presence unspecified 12/14/2016     Priority: Medium     Cervicalgia 08/28/2015     Priority: Medium     Senile osteoporosis 12/10/2014     Priority: Medium     Hyperlipidemia LDL goal <130 12/10/2014     Priority: Medium     CARDIOVASCULAR SCREENING; LDL GOAL LESS THAN 130 09/18/2013     Priority: Medium     Hypertension goal BP (blood pressure) < 140/90 09/18/2013     Priority: Medium      Past Medical History:   Diagnosis Date     Breast cancer (H) 2004    lumpectomy and 33 lymph nodes removed.     Shingles      Past Surgical History:   Procedure Laterality Date     COLONOSCOPY N/A 12/12/2014    Procedure: COLONOSCOPY;  Surgeon: Bandar Guidry MD;  Location:  GI     HYSTERECTOMY, PAP NO LONGER INDICATED       INJECT EPIDURAL CERVICAL N/A 9/23/2015    Procedure: INJECT EPIDURAL CERVICAL;  Surgeon: Sawyer Webster MD;  Location:  OR     Current Outpatient Prescriptions   Medication Sig Dispense Refill     acetaminophen (TYLENOL) 325 MG tablet Take 325-650 mg by mouth every 6 hours as needed       Cholecalciferol (VITAMIN D) 2000 UNITS tablet Take 2,000 Units by mouth daily       gabapentin (NEURONTIN) 300 MG capsule TAKE ONE CAPSULE BY MOUTH TWICE DAILY **NEEDS  LABS  FOR  FURTHER  REFILLS** 180 capsule 0     lisinopril (PRINIVIL/ZESTRIL) 10 MG tablet Take 1 tablet (10 mg) by mouth daily Appointment needed for additional refills. 30 tablet 0     Multiple Vitamins-Minerals (MULTIVITAMIN OR)        omega 3 1000 MG CAPS Take 1 g by mouth daily 90 capsule      OTC products: None, except as noted above    No  Known Allergies   Latex Allergy: NO    Social History   Substance Use Topics     Smoking status: Never Smoker     Smokeless tobacco: Never Used     Alcohol use No     History   Drug Use No       REVIEW OF SYSTEMS:   CONSTITUTIONAL: NEGATIVE for fever, chills, change in weight  ENT/MOUTH: NEGATIVE for ear, mouth and throat problems  RESP: NEGATIVE for significant cough or SOB  CV: NEGATIVE for chest pain, palpitations or peripheral edema  GI: NEGATIVE for nausea, abdominal pain, heartburn, or change in bowel habits  MUSCULOSKELETAL: NEGATIVE for significant arthralgias or myalgia    EXAM:   There were no vitals taken for this visit.    GENERAL APPEARANCE: healthy, alert and no distress     EYES: EOMI, PERRL     HENT: ear canals and TM's normal and nose and mouth without ulcers or lesions     NECK: no adenopathy, no asymmetry, masses, or scars and thyroid normal to palpation     RESP: lungs clear to auscultation - no rales, rhonchi or wheezes     CV: regular rates and rhythm, normal S1 S2, no S3 or S4 and no murmur, click or rub     ABDOMEN:  soft, nontender, no HSM or masses and bowel sounds normal     MS: extremities normal- no gross deformities noted, no evidence of inflammation in joints, FROM in all extremities.     SKIN: no suspicious lesions or rashes     NEURO: Normal strength and tone, sensory exam grossly normal, mentation intact and speech normal     PSYCH: mentation appears normal. and affect normal/bright     LYMPHATICS: No cervical adenopathy    DIAGNOSTICS:   No labs or EKG required for low risk surgery (cataract, skin procedure, breast biopsy, etc)    Recent Labs   Lab Test  09/22/17   0916  09/13/16   0939   06/04/14   1032  03/06/13   HGB   --    --    --   13.6   --    --    PLT   --    --    --   310   --    --    NA  141  137   < >  144   < >  139   POTASSIUM  4.4  4.2   < >  4.6   < >  4.8   CR  0.75  0.67   < >  0.88   < >  0.84   A1C   --    --    --    --    --   5.5    < > = values in this  interval not displayed.        IMPRESSION:   Reason for surgery/procedure: Cataracts    The proposed surgical procedure is considered LOW risk.    REVISED CARDIAC RISK INDEX  The patient has the following serious cardiovascular risks for perioperative complications such as (MI, PE, VFib and 3  AV Block):  No serious cardiac risks  INTERPRETATION: 0 risks: Class I (very low risk - 0.4% complication rate)    The patient has the following additional risks for perioperative complications:  No identified additional risks      ICD-10-CM    1. Preop general physical exam Z01.818        RECOMMENDATIONS:             APPROVAL GIVEN to proceed with proposed procedure, without further diagnostic evaluation       Signed Electronically by: Bandar Will MD    Copy of this evaluation report is provided to requesting physician.    [facility] Preop Guidelines    Revised Cardiac Risk Index    Injectable Influenza Immunization Documentation    1.  Is the person to be vaccinated sick today?   No    2. Does the person to be vaccinated have an allergy to a component   of the vaccine?   No  Egg Allergy Algorithm Link    3. Has the person to be vaccinated ever had a serious reaction   to influenza vaccine in the past?   No    4. Has the person to be vaccinated ever had Guillain-Barré syndrome?   No    Form completed by Nicol Fisher MA

## 2018-11-29 ENCOUNTER — SURGERY (OUTPATIENT)
Age: 78
End: 2018-11-29

## 2018-11-29 ENCOUNTER — HOSPITAL ENCOUNTER (OUTPATIENT)
Facility: CLINIC | Age: 78
Discharge: HOME | End: 2018-11-29
Attending: OPHTHALMOLOGY | Admitting: OPHTHALMOLOGY
Payer: COMMERCIAL

## 2018-11-29 ENCOUNTER — ANESTHESIA EVENT (OUTPATIENT)
Dept: SURGERY | Facility: CLINIC | Age: 78
End: 2018-11-29
Payer: COMMERCIAL

## 2018-11-29 ENCOUNTER — ANESTHESIA (OUTPATIENT)
Dept: SURGERY | Facility: CLINIC | Age: 78
End: 2018-11-29
Payer: COMMERCIAL

## 2018-11-29 VITALS
DIASTOLIC BLOOD PRESSURE: 63 MMHG | TEMPERATURE: 98.1 F | OXYGEN SATURATION: 99 % | HEART RATE: 83 BPM | SYSTOLIC BLOOD PRESSURE: 114 MMHG | RESPIRATION RATE: 16 BRPM

## 2018-11-29 PROCEDURE — 25000125 ZZHC RX 250: Performed by: NURSE ANESTHETIST, CERTIFIED REGISTERED

## 2018-11-29 PROCEDURE — 71000022 ZZH RECOVERY CATRACT PACKAGE: Performed by: OPHTHALMOLOGY

## 2018-11-29 PROCEDURE — 25000128 H RX IP 250 OP 636: Performed by: OPHTHALMOLOGY

## 2018-11-29 PROCEDURE — 37000012 ZZH ANESTHESIA CATARACT PACKAGE: Performed by: OPHTHALMOLOGY

## 2018-11-29 PROCEDURE — V2632 POST CHMBR INTRAOCULAR LENS: HCPCS | Performed by: OPHTHALMOLOGY

## 2018-11-29 PROCEDURE — 25000125 ZZHC RX 250: Performed by: OPHTHALMOLOGY

## 2018-11-29 PROCEDURE — 25000128 H RX IP 250 OP 636: Performed by: NURSE ANESTHETIST, CERTIFIED REGISTERED

## 2018-11-29 PROCEDURE — 36000025 ZZH CATARACT SURGICAL PACKAGE: Performed by: OPHTHALMOLOGY

## 2018-11-29 DEVICE — EYE IMP IOL AMO PCL TECNIS ZCB00 21.0: Type: IMPLANTABLE DEVICE | Site: EYE | Status: FUNCTIONAL

## 2018-11-29 RX ORDER — LIDOCAINE 40 MG/G
CREAM TOPICAL
Status: DISCONTINUED | OUTPATIENT
Start: 2018-11-29 | End: 2018-11-29 | Stop reason: HOSPADM

## 2018-11-29 RX ORDER — KETAMINE HYDROCHLORIDE 10 MG/ML
INJECTION INTRAMUSCULAR; INTRAVENOUS PRN
Status: DISCONTINUED | OUTPATIENT
Start: 2018-11-29 | End: 2018-11-29

## 2018-11-29 RX ORDER — FENTANYL CITRATE 50 UG/ML
25-50 INJECTION, SOLUTION INTRAMUSCULAR; INTRAVENOUS
Status: CANCELLED | OUTPATIENT
Start: 2018-11-29

## 2018-11-29 RX ORDER — ALBUTEROL SULFATE 0.83 MG/ML
2.5 SOLUTION RESPIRATORY (INHALATION) EVERY 4 HOURS PRN
Status: CANCELLED | OUTPATIENT
Start: 2018-11-29

## 2018-11-29 RX ORDER — SODIUM CHLORIDE, SODIUM LACTATE, POTASSIUM CHLORIDE, CALCIUM CHLORIDE 600; 310; 30; 20 MG/100ML; MG/100ML; MG/100ML; MG/100ML
INJECTION, SOLUTION INTRAVENOUS CONTINUOUS
Status: CANCELLED | OUTPATIENT
Start: 2018-11-29

## 2018-11-29 RX ORDER — PROPARACAINE HYDROCHLORIDE 5 MG/ML
1 SOLUTION/ DROPS OPHTHALMIC ONCE
Status: DISCONTINUED | OUTPATIENT
Start: 2018-11-29 | End: 2018-11-29 | Stop reason: HOSPADM

## 2018-11-29 RX ORDER — ONDANSETRON 4 MG/1
4 TABLET, ORALLY DISINTEGRATING ORAL EVERY 30 MIN PRN
Status: CANCELLED | OUTPATIENT
Start: 2018-11-29

## 2018-11-29 RX ORDER — LIDOCAINE HYDROCHLORIDE 10 MG/ML
INJECTION, SOLUTION INFILTRATION; PERINEURAL PRN
Status: DISCONTINUED | OUTPATIENT
Start: 2018-11-29 | End: 2018-11-29 | Stop reason: HOSPADM

## 2018-11-29 RX ORDER — TROPICAMIDE 10 MG/ML
1 SOLUTION/ DROPS OPHTHALMIC
Status: COMPLETED | OUTPATIENT
Start: 2018-11-29 | End: 2018-11-29

## 2018-11-29 RX ORDER — PHENYLEPHRINE HYDROCHLORIDE 25 MG/ML
1 SOLUTION/ DROPS OPHTHALMIC
Status: COMPLETED | OUTPATIENT
Start: 2018-11-29 | End: 2018-11-29

## 2018-11-29 RX ORDER — PROPARACAINE HYDROCHLORIDE 5 MG/ML
1 SOLUTION/ DROPS OPHTHALMIC ONCE
Status: COMPLETED | OUTPATIENT
Start: 2018-11-29 | End: 2018-11-29

## 2018-11-29 RX ORDER — CYCLOPENTOLATE HYDROCHLORIDE 10 MG/ML
1 SOLUTION/ DROPS OPHTHALMIC
Status: COMPLETED | OUTPATIENT
Start: 2018-11-29 | End: 2018-11-29

## 2018-11-29 RX ORDER — ONDANSETRON 2 MG/ML
4 INJECTION INTRAMUSCULAR; INTRAVENOUS EVERY 30 MIN PRN
Status: CANCELLED | OUTPATIENT
Start: 2018-11-29

## 2018-11-29 RX ADMIN — TROPICAMIDE 1 DROP: 10 SOLUTION/ DROPS OPHTHALMIC at 08:42

## 2018-11-29 RX ADMIN — PROPARACAINE HYDROCHLORIDE 1 DROP: 5 SOLUTION/ DROPS OPHTHALMIC at 08:42

## 2018-11-29 RX ADMIN — PHENYLEPHRINE HYDROCHLORIDE 1 DROP: 25 SOLUTION/ DROPS OPHTHALMIC at 08:55

## 2018-11-29 RX ADMIN — EPINEPHRINE 300 ML: 1 INJECTION, SOLUTION INTRAMUSCULAR; SUBCUTANEOUS at 10:13

## 2018-11-29 RX ADMIN — LIDOCAINE HYDROCHLORIDE 1 ML: 10 INJECTION, SOLUTION INFILTRATION; PERINEURAL at 10:13

## 2018-11-29 RX ADMIN — KETAMINE HCL-NACL SOLN PREF SY 50 MG/5ML-0.9% (10MG/ML) 10 MG: 10 SOLUTION PREFILLED SYRINGE at 10:02

## 2018-11-29 RX ADMIN — LIDOCAINE HYDROCHLORIDE 1 ML: 10 INJECTION, SOLUTION EPIDURAL; INFILTRATION; INTRACAUDAL at 08:42

## 2018-11-29 RX ADMIN — Medication 1.4 ML GIVEN: at 10:13

## 2018-11-29 RX ADMIN — PHENYLEPHRINE HYDROCHLORIDE 1 DROP: 25 SOLUTION/ DROPS OPHTHALMIC at 08:59

## 2018-11-29 RX ADMIN — CYCLOPENTOLATE HYDROCHLORIDE 1 DROP: 10 SOLUTION OPHTHALMIC at 08:42

## 2018-11-29 RX ADMIN — MIDAZOLAM 1 MG: 1 INJECTION INTRAMUSCULAR; INTRAVENOUS at 10:02

## 2018-11-29 RX ADMIN — TROPICAMIDE 1 DROP: 10 SOLUTION/ DROPS OPHTHALMIC at 08:59

## 2018-11-29 RX ADMIN — CYCLOPENTOLATE HYDROCHLORIDE 1 DROP: 10 SOLUTION OPHTHALMIC at 08:59

## 2018-11-29 RX ADMIN — PHENYLEPHRINE HYDROCHLORIDE 1 DROP: 25 SOLUTION/ DROPS OPHTHALMIC at 08:42

## 2018-11-29 RX ADMIN — CYCLOPENTOLATE HYDROCHLORIDE 1 DROP: 10 SOLUTION OPHTHALMIC at 08:55

## 2018-11-29 RX ADMIN — TROPICAMIDE 1 DROP: 10 SOLUTION/ DROPS OPHTHALMIC at 08:55

## 2018-11-29 NOTE — OP NOTE
PREOPERATIVE DIAGNOSIS: Visually significant cataract, Right eye   POSTOPERATIVE DIAGNOSIS: Same   PROCEDURES:   1. Cataract extraction with intraocular lens implant Right eye.  SURGEON: Rian Ford M.D.  INDICATIONS: The patient Coby Damon presented to the eye clinic with decreased vision secondary to cataract in the Right eye. The risks, including, but not limited to infection, loss of vision, loss of eye, need for more surgery, and bleeding, along with the benefits, alternatives, expectations, and the procedure itself were discussed at length with the patient who wished to proceed with surgery. All questions were answered to the patient's satisfaction. The stated procedure is still clinically indicated.   DESCRIPTION OF PROCEDURE:   Prior to the procedure, appropriate cardiac and respiratory monitors were applied to the patient.  In the pre-operative holding area, a drop of topical tetracaine followed by povidone iodine followed by lidocaine gel.  The patient was brought to the operating room where a surgical pause was carried out to identify with all members of the surgical team the correct surgical site.  With adequate anesthesia, the Right eye was prepped and draped in the usual sterile fashion. A lid speculum was placed, and the operating microscope was rotated into position. A paracentesis was created.  Through this limbal paracentesis, the anterior chamber was filled with preservative-free lidocaine followed by viscoelastic.   A temporal clear corneal incision was created at the limbus using a 2.5 mm blade. A capsulorrhexis was initiated using a cystotome and was completed in continuous and circular fashion using the capsulorrhexis forceps. The lens nucleus was hydrodissected using balanced salt solution.  The lens nucleus was rotated and removed using phacoemulsification in a stop and chop technique.  Residual cortical material was removed using irrigation-aspiration.  The capsular bag was  reinflated to its maximal extent with cohesive viscoelastic.  A 21.0 diopter ZCBOO was inserted into the capsular bag and noted to be well centered.  The lens power selected was reviewed using the intraocular lens power measurements that were obtained preoperatively to confirm that the correct lens was selected for the desired post-operative refractive state. The residual viscoelastic was aspirated. The anterior chamber was inflated with balanced salt solution and the wounds were hydrated and found to be self-sealing.  The eye was palpated and found to be of normal physiologic pressure. The lid speculum was removed. One drop of postoperative anti-inflammatory and antibiotic medication was instilled in the eye and a clear shield placed over the eye. The patient tolerated the procedure well and there were no intraoperative complications.      PLAN: The patient will be discharged to home and will follow up tomorrow in the eye clinic.  EBL:  None  Complications:  None    Implant Name Type Inv. Item Serial No.  Lot No. LRB No. Used   EYE IMP IOL CASI PCL TECNIS ZCB00 21.0 Lens/Eye Implant EYE IMP IOL CASI PCL TECNIS ZCB00 21.0 [id number] [employer]   Right 1

## 2018-11-29 NOTE — IP AVS SNAPSHOT
MRN:[id number]                      After Visit Summary   11/29/2018    Coby Damon    MRN: [id number]           Thank you!     Thank you for choosing [facility] for your care. Our goal is always to provide you with excellent care. Hearing back from our patients is one way we can continue to improve our services. Please take a few minutes to complete the written survey that you may receive in the mail after you visit with us. Thank you!        Patient Information     Date Of Birth          1940        About your hospital stay     You were admitted on:  November 29, 2018 You last received care in the:  [facility] Phase II    You were discharged on:  November 29, 2018       Who to Call     For medical emergencies, please call 911.  For non-urgent questions about your medical care, please call your primary care provider or clinic, [phone]  For questions related to your surgery, please call your surgery clinic        Attending Provider     Provider Specialty    Rian Eid MD Ophthalmology       Primary Care Provider Office Phone # Fax #    Bandar Will [phone]      Further instructions from your care team        POST CATARACT SURGERY EYE INSTRUCTIONS  DR. EID           Eye Medications    VIGAMOX/OFLOXACIN (Tan Cap)    KETOROLAC (Glover Cap)    PREDNISOLONE (Pink or White Cap)    If you opted for the individual bottles of eye medications follow these instructions.    *Instill one drop from each bottle into the operative eye 3 times a day until each  bottle is empty.    *Wait 5 minutes between each drop.    If you opted for the one bottle containing all 3 eye medications, follow these instructions.    *Instill one drop into the operative eye 3 times a day until the bottle is empty.       - If your are currently being treated for glaucoma please continue your    medication as normally prescribed.     - Wear eye shield while sleeping for 3  days     - Refrain from heavy lifting, bending, straining, or strenuous activity for 1      week.     - Do not rub or push on the operative eye for 1 week (you may wipe the    eye lid(s) gently with a wet wash cloth to remove matter from                         eyelashes).     - You may bathe or shower, but do not get the eye wet for 1 month      (swimming, hot tubs or other water activities).     - Minor itching, scratching sensation, burning sensation, etc. is normal.     Report any severe eye pain or loss of vision.     - Please call our office at [phone] if you have questions or     concerns.  Same-Day Surgery   Adult Discharge Orders & Instructions     For 24 hours after surgery    1. Get plenty of rest.  A responsible adult must stay with you for at least 24 hours after you leave the hospital.   2. Do not drive or use heavy equipment.  If you have weakness or tingling, don't drive or use heavy equipment until this feeling goes away.  3. Do not drink alcohol.  4. Avoid strenuous or risky activities.  Ask for help when climbing stairs.   5. You may feel lightheaded.  If so, sit for a few minutes before standing.  Have someone help you get up.   6. You may have a slight fever. Call the doctor if your fever is over 100 F (37.7 C) (taken under the tongue) or lasts longer than 24 hours.  7. You may have a dry mouth, a sore throat, muscle aches or trouble sleeping.  These should go away after 24 hours.  8. Do not make important or legal decisions.  Based on the surgery/procedure that you had today, we do not expect that you will have any problems.  However, we want you to know what to do if you have pain, nausea, bleeding,or infection:  To control pain:  Take medicines your physician has prescribed or or over-the counter medicine he or she advises.  Ice packs and periods of rest are often helpful.  For surgery on an arm or leg, raise it on a pillow to ease swelling.  If your pain is not managed with the above  methods, contact your physician.  To control nausea:  Take anti-nausea medicine approved by your physician.  Drink clear liquids such as apple juice, ginger ale, broth or 7-Up. Be sure to drink enough fluids.  Move to a regular diet as you feel able.  Rest may also help.  Bleeding:  You may see a little blood on your dressing, about the size of a quarter in the first 24 hours.  If you see this, there is no reason to be alarmed.  However, if this continues to increase in size, apply pressure if able, and notify your physician.  Infection: Please contact your physician if you have any of the following signs:  redness, swelling, heat, increasing pain or foul-smelling drainage at your surgery site, fever or chills.    Call your doctor for any of the followin.  It has been over 8 to 10 hours since surgery and you are still not able to urinate (pass water).    2.  Headache for over 24 hours.        Pending Results     No orders found from 2018 to 2018.            Admission Information     Date & Time Provider Department Dept. Phone    2018 Rian Ford MD [facility] Phase [phone]      Your Vitals Were     Blood Pressure Pulse Temperature Respirations Pulse Oximetry       137/81 83 98.1  F (36.7  C) (Oral) 16 99%       Care EveryWhere ID     This is your Care EveryWhere ID. This could be used by other organizations to access your [facility] medical records  [id number]        Equal Access to Services     HRARY MARTINEZ AH: Hadii ervin molina Sojaya, waaxda luqadaha, qaybta kaalmada ike, marixa worthy. So [facility] [phone].    ATENCIÓN: Si habla español, tiene a nicholas disposición servicios gratuitos de asistencia lingüística. Llame al [phone].    We comply with applicable federal civil rights laws and [place] laws. We do not discriminate on the basis of race, color, national origin, age, disability, sex, sexual orientation, or gender  identity.               Review of your medicines      CONTINUE these medicines which have NOT CHANGED        Dose / Directions    acetaminophen 325 MG tablet   Commonly known as:  TYLENOL        Dose:  325-650 mg   Take 325-650 mg by mouth every 6 hours as needed   Refills:  0       gabapentin 300 MG capsule   Commonly known as:  NEURONTIN   Used for:  Low back pain, unspecified back pain laterality, unspecified chronicity, with sciatica presence unspecified        TAKE 1 CAPSULE BY MOUTH TWICE DAILY **NEEDS  LABS  FOR  FURTHER  REFILLS**   Quantity:  180 capsule   Refills:  0       IBUPROFEN PO   Indication:  Mild to Moderate Pain        Dose:  400 mg   Take 400 mg by mouth every 6 hours as needed for moderate pain   Refills:  0       lisinopril 10 MG tablet   Commonly known as:  PRINIVIL/ZESTRIL   Used for:  Hypertension goal BP (blood pressure) < 140/90        Dose:  10 mg   Take 1 tablet (10 mg) by mouth daily   Quantity:  90 tablet   Refills:  3       MULTIVITAMIN PO        Refills:  0       omega 3 1000 MG Caps        Dose:  1 g   Take 1 g by mouth daily   Quantity:  90 capsule   Refills:  0       vitamin D3 2000 units tablet   Commonly known as:  CHOLECALCIFEROL        Dose:  2000 Units   Take 2,000 Units by mouth daily   Refills:  0                Protect others around you: Learn how to safely use, store and throw away your medicines at www.disposemymeds.org.             Medication List: This is a list of all your medications and when to take them. Check marks below indicate your daily home schedule. Keep this list as a reference.      Medications           Morning Afternoon Evening Bedtime As Needed    acetaminophen 325 MG tablet   Commonly known as:  TYLENOL   Take 325-650 mg by mouth every 6 hours as needed                                gabapentin 300 MG capsule   Commonly known as:  NEURONTIN   TAKE 1 CAPSULE BY MOUTH TWICE DAILY **NEEDS  LABS  FOR  FURTHER  REFILLS**                                 IBUPROFEN PO   Take 400 mg by mouth every 6 hours as needed for moderate pain                                lisinopril 10 MG tablet   Commonly known as:  PRINIVIL/ZESTRIL   Take 1 tablet (10 mg) by mouth daily                                MULTIVITAMIN PO                                omega 3 1000 MG Caps   Take 1 g by mouth daily                                vitamin D3 2000 units tablet   Commonly known as:  CHOLECALCIFEROL   Take 2,000 Units by mouth daily

## 2018-11-29 NOTE — ANESTHESIA POSTPROCEDURE EVALUATION
Patient: Coby Damon    Procedure(s):  PHACOEMULSIFICATION WITH STANDARD INTRAOCULAR LENS IMPLANT RIGHT EYE    Diagnosis:NUCLEAR CATARACT  Diagnosis Additional Information: No value filed.    Anesthesia Type:  MAC    Note:  Anesthesia Post Evaluation    Patient location during evaluation: Phase 2  Patient participation: Able to fully participate in evaluation  Level of consciousness: awake  Pain management: adequate  Airway patency: patent  Cardiovascular status: acceptable and hemodynamically stable  Respiratory status: acceptable, room air and nonlabored ventilation  Hydration status: stable  PONV: none     Anesthetic complications: None    Comments: Patient was happy with the anesthesia care received and no anesthesia related complications were noted.  I will follow up with the patient again if it is needed.        Last vitals:  Vitals:    11/29/18 0840   BP: 137/81   Pulse: 83   Resp: 16   Temp: 98.1  F (36.7  C)   SpO2: 99%         Electronically Signed By: SHONDA Guerin CRNA  November 29, 2018  10:37 AM

## 2018-11-29 NOTE — IP AVS SNAPSHOT
[facility]    [address]     [place] [address]    Phone:  [phone]                                       After Visit Summary   11/29/2018    Coyb Damon    MRN: [id number]           After Visit Summary Signature Page     I have received my discharge instructions, and my questions have been answered. I have discussed any challenges I see with this plan with the nurse or doctor.    ..........................................................................................................................................  Patient/Patient Representative Signature      ..........................................................................................................................................  Patient Representative Print Name and Relationship to Patient    ..................................................               ................................................  Date                                   Time    ..........................................................................................................................................  Reviewed by Signature/Title    ...................................................              ..............................................  Date                                               Time          22EPIC Rev 08/18

## 2018-11-29 NOTE — DISCHARGE INSTRUCTIONS
POST CATARACT SURGERY EYE INSTRUCTIONS  DR. EID           Eye Medications    VIGAMOX/OFLOXACIN (Tan Cap)    KETOROLAC (Glover Cap)    PREDNISOLONE (Pink or White Cap)    If you opted for the individual bottles of eye medications follow these instructions.    *Instill one drop from each bottle into the operative eye 3 times a day until each  bottle is empty.    *Wait 5 minutes between each drop.    If you opted for the one bottle containing all 3 eye medications, follow these instructions.    *Instill one drop into the operative eye 3 times a day until the bottle is empty.       - If your are currently being treated for glaucoma please continue your    medication as normally prescribed.     - Wear eye shield while sleeping for 3 days     - Refrain from heavy lifting, bending, straining, or strenuous activity for 1      week.     - Do not rub or push on the operative eye for 1 week (you may wipe the    eye lid(s) gently with a wet wash cloth to remove matter from                         eyelashes).     - You may bathe or shower, but do not get the eye wet for 1 month      (swimming, hot tubs or other water activities).     - Minor itching, scratching sensation, burning sensation, etc. is normal.     Report any severe eye pain or loss of vision.     - Please call our office at [phone] if you have questions or     concerns.  Same-Day Surgery   Adult Discharge Orders & Instructions     For 24 hours after surgery    1. Get plenty of rest.  A responsible adult must stay with you for at least 24 hours after you leave the hospital.   2. Do not drive or use heavy equipment.  If you have weakness or tingling, don't drive or use heavy equipment until this feeling goes away.  3. Do not drink alcohol.  4. Avoid strenuous or risky activities.  Ask for help when climbing stairs.   5. You may feel lightheaded.  If so, sit for a few minutes before standing.  Have someone help you get up.   6. You may have a slight fever.  Call the doctor if your fever is over 100 F (37.7 C) (taken under the tongue) or lasts longer than 24 hours.  7. You may have a dry mouth, a sore throat, muscle aches or trouble sleeping.  These should go away after 24 hours.  8. Do not make important or legal decisions.  Based on the surgery/procedure that you had today, we do not expect that you will have any problems.  However, we want you to know what to do if you have pain, nausea, bleeding,or infection:  To control pain:  Take medicines your physician has prescribed or or over-the counter medicine he or she advises.  Ice packs and periods of rest are often helpful.  For surgery on an arm or leg, raise it on a pillow to ease swelling.  If your pain is not managed with the above methods, contact your physician.  To control nausea:  Take anti-nausea medicine approved by your physician.  Drink clear liquids such as apple juice, ginger ale, broth or 7-Up. Be sure to drink enough fluids.  Move to a regular diet as you feel able.  Rest may also help.  Bleeding:  You may see a little blood on your dressing, about the size of a quarter in the first 24 hours.  If you see this, there is no reason to be alarmed.  However, if this continues to increase in size, apply pressure if able, and notify your physician.  Infection: Please contact your physician if you have any of the following signs:  redness, swelling, heat, increasing pain or foul-smelling drainage at your surgery site, fever or chills.    Call your doctor for any of the followin.  It has been over 8 to 10 hours since surgery and you are still not able to urinate (pass water).    2.  Headache for over 24 hours.

## 2018-11-29 NOTE — ANESTHESIA PREPROCEDURE EVALUATION
Anesthesia Evaluation     . Pt has had prior anesthetic. Type: General           ROS/MED HX    ENT/Pulmonary:  - neg pulmonary ROS     Neurologic:  - neg neurologic ROS     Cardiovascular:     (+) Dyslipidemia, hypertension----. : . . . :. . No previous cardiac testing       METS/Exercise Tolerance:     Hematologic:  - neg hematologic  ROS       Musculoskeletal:   (+) arthritis, , , -       GI/Hepatic:  - neg GI/hepatic ROS       Renal/Genitourinary:  - ROS Renal section negative   (+) Pt has no history of transplant,       Endo:  - neg endo ROS       Psychiatric:  - neg psychiatric ROS       Infectious Disease:  - neg infectious disease ROS       Malignancy:      - no malignancy   Other:    (+) No chance of pregnancy C-spine cleared: N/A, no H/O Chronic Pain,no other significant disability   - neg other ROS                 Physical Exam  Normal systems: cardiovascular, pulmonary and dental    Airway   Mallampati: II  TM distance: >3 FB  Neck ROM: full    Dental     Cardiovascular   Rhythm and rate: regular and normal      Pulmonary    breath sounds clear to auscultation                    Anesthesia Plan      History & Physical Review  History and physical reviewed and following examination; no interval change.    ASA Status:  2 .    NPO Status:  > 6 hours    Plan for MAC with Other induction. Reason for MAC:  Deep or markedly invasive procedure (G8)         Postoperative Care      Consents  Anesthetic plan, risks, benefits and alternatives discussed with:  Patient.  Use of blood products discussed: No .   .                          .

## 2018-11-29 NOTE — ANESTHESIA CARE TRANSFER NOTE
Patient: Coby Damon    Procedure(s):  PHACOEMULSIFICATION WITH STANDARD INTRAOCULAR LENS IMPLANT RIGHT EYE    Diagnosis: NUCLEAR CATARACT  Diagnosis Additional Information: No value filed.    Anesthesia Type:   MAC     Note:  Airway :Room Air  Patient transferred to:Phase II  Handoff Report: Identifed the Patient, Identified the Reponsible Provider, Reviewed the pertinent medical history, Discussed the surgical course, Reviewed Intra-OP anesthesia mangement and issues during anesthesia, Set expectations for post-procedure period and Allowed opportunity for questions and acknowledgement of understanding      Vitals: (Last set prior to Anesthesia Care Transfer)    CRNA VITALS  11/29/2018 0952 - 11/29/2018 1022      11/29/2018             SpO2: 100 %    Resp Rate (observed): 14                Electronically Signed By: SHONDA Guerin CRNA  November 29, 2018  10:22 AM

## 2019-01-30 ENCOUNTER — HOSPITAL ENCOUNTER (OUTPATIENT)
Dept: GENERAL RADIOLOGY | Facility: CLINIC | Age: 79
Discharge: HOME | End: 2019-01-30
Attending: FAMILY MEDICINE | Admitting: FAMILY MEDICINE
Payer: COMMERCIAL

## 2019-01-30 ENCOUNTER — OFFICE VISIT (OUTPATIENT)
Dept: FAMILY MEDICINE | Facility: CLINIC | Age: 79
End: 2019-01-30
Payer: COMMERCIAL

## 2019-01-30 VITALS
WEIGHT: 112.7 LBS | HEART RATE: 89 BPM | TEMPERATURE: 97.9 F | DIASTOLIC BLOOD PRESSURE: 62 MMHG | RESPIRATION RATE: 12 BRPM | BODY MASS INDEX: 22.01 KG/M2 | OXYGEN SATURATION: 96 % | SYSTOLIC BLOOD PRESSURE: 110 MMHG

## 2019-01-30 DIAGNOSIS — M54.16 LUMBAR BACK PAIN WITH RADICULOPATHY AFFECTING LEFT LOWER EXTREMITY: Primary | ICD-10-CM

## 2019-01-30 DIAGNOSIS — M81.0 SENILE OSTEOPOROSIS: ICD-10-CM

## 2019-01-30 DIAGNOSIS — M54.16 LUMBAR BACK PAIN WITH RADICULOPATHY AFFECTING LEFT LOWER EXTREMITY: ICD-10-CM

## 2019-01-30 DIAGNOSIS — M41.9 SCOLIOSIS OF THORACOLUMBAR SPINE, UNSPECIFIED SCOLIOSIS TYPE: ICD-10-CM

## 2019-01-30 PROCEDURE — 99214 OFFICE O/P EST MOD 30 MIN: CPT | Performed by: FAMILY MEDICINE

## 2019-01-30 PROCEDURE — 72100 X-RAY EXAM L-S SPINE 2/3 VWS: CPT | Mod: TC

## 2019-01-30 ASSESSMENT — PAIN SCALES - GENERAL: PAINLEVEL: MODERATE PAIN (5)

## 2019-01-30 NOTE — PROGRESS NOTES
SUBJECTIVE:   Coby Damon is a 79 year old female who presents to clinic today for the following health issues:      Joint Pain    Onset: x couple months    Description:   Location: right hip and left leg and lower back  Character: Sharp, Dull ache, Stabbing and Burning    Intensity: moderate    Progression of Symptoms: worse    Accompanying Signs & Symptoms:  Other symptoms: radiation of pain to left leg and right hip     History:   Previous similar pain: YES- hip and leg pain are getting worse      Precipitating factors:   Trauma or overuse: no     Alleviating factors:  Improved by: nothing    Therapies Tried and outcome: rest, ice, heat, gabapentin             Problem list and histories reviewed & adjusted, as indicated.  Additional history: as documented        Reviewed and updated as needed this visit by clinical staff  Tobacco  Allergies  Meds  Med Hx  Surg Hx  Fam Hx  Soc Hx      Reviewed and updated as needed this visit by Provider        SUBJECTIVE:  Coby  is a 79 year old female who presents for: Worsening back pain and into her right hip and left leg over the last couple of months.  Her right buttocks burns in the area.  She has pain going down the leg on the left but not on the buttocks.  Standing makes it worse.  She can hardly stand long enough to peel several potatoes.  She has known scoliosis.  She has had epidural cervical injections which have been beneficial and she wonders about this possibility for her lumbar spine.    Past Medical History:   Diagnosis Date     Breast cancer (H) 2004    lumpectomy and 33 lymph nodes removed.     Shingles      Past Surgical History:   Procedure Laterality Date     COLONOSCOPY N/A 12/12/2014    Procedure: COLONOSCOPY;  Surgeon: Bandar Guidry MD;  Location:  GI     HYSTERECTOMY, PAP NO LONGER INDICATED       INJECT EPIDURAL CERVICAL N/A 9/23/2015    Procedure: INJECT EPIDURAL CERVICAL;  Surgeon: Sawyer Webster MD;  Location:  OR      PHACOEMULSIFICATION WITH STANDARD INTRAOCULAR LENS IMPLANT Left 11/15/2018    Procedure: PHACOEMULSIFICATION WITH STANDARD INTRAOCULAR LENS IMPLANT LEFT EYE;  Surgeon: Rian Ford MD;  Location: [facility] OR     PHACOEMULSIFICATION WITH STANDARD INTRAOCULAR LENS IMPLANT Right 11/29/2018    Procedure: PHACOEMULSIFICATION WITH STANDARD INTRAOCULAR LENS IMPLANT RIGHT EYE;  Surgeon: Rian Ford MD;  Location: [facility] OR     Social History     Tobacco Use     Smoking status: Never Smoker     Smokeless tobacco: Never Used   Substance Use Topics     Alcohol use: No     Current Outpatient Medications   Medication Sig Dispense Refill     acetaminophen (TYLENOL) 325 MG tablet Take 325-650 mg by mouth every 6 hours as needed       Cholecalciferol (VITAMIN D) 2000 UNITS tablet Take 2,000 Units by mouth daily       gabapentin (NEURONTIN) 300 MG capsule TAKE 1 CAPSULE BY MOUTH TWICE DAILY **NEEDS  LABS  FOR  FURTHER  REFILLS** 180 capsule 0     IBUPROFEN PO Take 400 mg by mouth every 6 hours as needed for moderate pain       lisinopril (PRINIVIL/ZESTRIL) 10 MG tablet Take 1 tablet (10 mg) by mouth daily 90 tablet 3     Multiple Vitamins-Minerals (MULTIVITAMIN OR)        omega 3 1000 MG CAPS Take 1 g by mouth daily 90 capsule        REVIEW OF SYSTEMS:   5 point ROS negative except as noted above in HPI, including Gen., Resp, CV, GI &  system review.     OBJECTIVE:  Vitals: /62   Pulse 89   Temp 97.9  F (36.6  C) (Temporal)   Resp 12   Wt 51.1 kg (112 lb 11.2 oz)   SpO2 96%   BMI 22.01 kg/m    BMI= Body mass index is 22.01 kg/m .  Pleasant lady appears in no distress.  Spine shows obvious curvature when observed from the back.  She is tender down in the lower lumbar sacroiliac area.  Some tenderness in the right sciatic area.  Straight leg raising is negative on both sides.  Gait is regular.  X-ray shows severe degenerative disease and severe scoliosis with rotation.    ASSESSMENT:  Lumbar back pain with  left radiculopathy #2 severe scoliosis #3 osteoporosis    PLAN:  We will refer her to neurosurgery.  May want to do further imaging.  Not sure if an injection would be beneficial to her with her severe scoliosis but she wants to inquire with them.  Continue to take 600 mg of ibuprofen at a time this seems to help her.        Bandar Will MD  [facility]

## 2019-02-11 ENCOUNTER — OFFICE VISIT (OUTPATIENT)
Dept: NEUROSURGERY | Facility: CLINIC | Age: 79
End: 2019-02-11
Payer: COMMERCIAL

## 2019-02-11 VITALS
WEIGHT: 114.3 LBS | TEMPERATURE: 97 F | BODY MASS INDEX: 22.32 KG/M2 | RESPIRATION RATE: 18 BRPM | SYSTOLIC BLOOD PRESSURE: 108 MMHG | DIASTOLIC BLOOD PRESSURE: 64 MMHG

## 2019-02-11 DIAGNOSIS — M41.25 OTHER IDIOPATHIC SCOLIOSIS, THORACOLUMBAR REGION: Primary | ICD-10-CM

## 2019-02-11 PROCEDURE — 99203 OFFICE O/P NEW LOW 30 MIN: CPT | Performed by: PHYSICIAN ASSISTANT

## 2019-02-11 ASSESSMENT — PAIN SCALES - GENERAL: PAINLEVEL: NO PAIN (0)

## 2019-02-11 NOTE — PROGRESS NOTES
Dr. Erickson Avila  [facility]  Neurosurgery Clinic Visit      CC: back pain    Primary care Provider: Bandar Will    Referring Provider:  Bandar Will      Reason For Visit:   I was asked to consult on the patient for back pain.      HPI: Coby Damon is a 79 year old female who presents for evaluation of her chief complaint of low back pain.  She states that she found out about 10 years ago that she has scoliosis.  She has been experiencing worsening low back pain, with pain that radiates into her right hip.  Symptoms are exacerbated with any prolonged activities.  She has had physical therapy in the past, but not recently.  She has had epidurals but only in the cervical spine.  She denies any focal weaknesses in the lower extremities.  She denies any bowel or bladder changes, or problems with balance or coordination.    Past Medical History:   Diagnosis Date     Breast cancer (H) 2004    lumpectomy and 33 lymph nodes removed.     Shingles        Past Medical History reviewed with patient during visit.    Past Surgical History:   Procedure Laterality Date     COLONOSCOPY N/A 12/12/2014    Procedure: COLONOSCOPY;  Surgeon: Bandar Guidry MD;  Location: [facility]     HYSTERECTOMY, PAP NO LONGER INDICATED       INJECT EPIDURAL CERVICAL N/A 9/23/2015    Procedure: INJECT EPIDURAL CERVICAL;  Surgeon: Sawyer Webster MD;  Location: [facility]     PHACOEMULSIFICATION WITH STANDARD INTRAOCULAR LENS IMPLANT Left 11/15/2018    Procedure: PHACOEMULSIFICATION WITH STANDARD INTRAOCULAR LENS IMPLANT LEFT EYE;  Surgeon: Rian Ford MD;  [facility]     PHACOEMULSIFICATION WITH STANDARD INTRAOCULAR LENS IMPLANT Right 11/29/2018    Procedure: PHACOEMULSIFICATION WITH STANDARD INTRAOCULAR LENS IMPLANT RIGHT EYE;  Surgeon: Rian Ford MD;  [facility]     Past Surgical History reviewed with patient during visit.    Current Outpatient Medications   Medication      acetaminophen (TYLENOL) 325 MG tablet     Cholecalciferol (VITAMIN D) 2000 UNITS tablet     gabapentin (NEURONTIN) 300 MG capsule     IBUPROFEN PO     lisinopril (PRINIVIL/ZESTRIL) 10 MG tablet     Multiple Vitamins-Minerals (MULTIVITAMIN OR)     omega 3 1000 MG CAPS     No current facility-administered medications for this visit.        No Known Allergies    Social History     Socioeconomic History     Marital status:      Spouse name: None     Number of children: None     Years of education: None     Highest education level: None   Social Needs     Financial resource strain: None     Food insecurity - worry: None     Food insecurity - inability: None     Transportation needs - medical: None     Transportation needs - non-medical: None   Occupational History     None   Tobacco Use     Smoking status: Never Smoker     Smokeless tobacco: Never Used   Substance and Sexual Activity     Alcohol use: No     Drug use: No     Sexual activity: No   Other Topics Concern     None   Social History Narrative     None       No family history on file.       ROS: 10 point ROS neg other than the symptoms noted above in the HPI.    Vital Signs: /64   Temp 97  F (36.1  C) (Temporal)   Resp 18   Wt 114 lb 4.8 oz (51.8 kg)   BMI 22.32 kg/m      Examination:  Constitutional:  Alert, well nourished, NAD.  HEENT: Normocephalic, atraumatic.   Pulmonary:  Without shortness of breath, normal effort.   Lymph: no lymphadenopathy to low back or LE.   Integumentary: Skin is free of rashes or lesions.   Cardiovascular:  No pitting edema of BLE.    Psych: Normal affect, no apparent distress    Neurological:  Awake  Alert  Oriented x 3  Speech clear  Cranial nerves II - XII grossly intact  Motor exam   Hip Flexor:                Right: 5/5  Left:  5/5  Hip Adductor:             Right:  5/5  Left:  5/5  Hip Abductor:             Right:  5/5  Left:  5/5  Gastroc Soleus:        Right:  5/5  Left:  5/5  Tib/Ant:                       Right:  5/5  Left:  5/5  EHL:                          Right:  5/5  Left:  5/5       Sensation normal to bilateral upper and lower extremities.    Reflexes are 2+ in the patellar and Achilles. There is no clonus. Downgoing Babinski.    Reflexes are 2+ in the brachial radialis and triceps. Negative Lori sign bilaterally.    Finger to Nose smooth  Pronator drift negative  Musculoskeletal:  Gait: Able to stand from a seated position. Normal non-antalgic, non-myelopathic gait.  Lumbar examination reveals no tenderness of the spine or paraspinous muscles.  Hip height is symmetrical. Negative SI joint, sciatic notch or greater trochanteric tenderness to palpation bilaterally.  Straight leg raise is negative bilaterally.      Imaging:   AP and lateral x-rays show a severe dextroconvex rotary scoliosis of the lumbar spine.    Assessment/Plan:     Severe thoracolumbar scoliosis with associated back pain     Coby Damon is a 79 year old female.  I did have a discussion with the patient regarding her symptoms.She understands the findings described in her x-rays above.  She knows that she has a history of severe scoliosis.  She is developing worsening back pain.  She is not interested in any surgical options at this time.  I did recommend returning to physical therapy, and I also discussed with her a referral to the pain service, which she did elect to proceed with.  If she does change her mind and seek surgical intervention, I would recommend referral to the [facility].  She voiced agreement and understanding.          Joaquín Aguilar PA-C  [facility]  [address] [address]    Tel [phone]  Pager [phone]

## 2019-02-11 NOTE — LETTER
2/11/2019         RE: Coby Damon  [address] [facility] [address]        Dear Colleague,    Thank you for referring your patient, Coby Damon, to the [facility]. Please see a copy of my visit note below.    Dr. Erickson Avila  [facility]  Neurosurgery Clinic Visit      CC: back pain    Primary care Provider: Bandar Will    Referring Provider:  Bandar Will      Reason For Visit:   I was asked to consult on the patient for back pain.      HPI: Coby Damon is a 79 year old female who presents for evaluation of her chief complaint of low back pain.  She states that she found out about 10 years ago that she has scoliosis.  She has been experiencing worsening low back pain, with pain that radiates into her right hip.  Symptoms are exacerbated with any prolonged activities.  She has had physical therapy in the past, but not recently.  She has had epidurals but only in the cervical spine.  She denies any focal weaknesses in the lower extremities.  She denies any bowel or bladder changes, or problems with balance or coordination.    Past Medical History:   Diagnosis Date     Breast cancer (H) 2004    lumpectomy and 33 lymph nodes removed.     Shingles        Past Medical History reviewed with patient during visit.    Past Surgical History:   Procedure Laterality Date     COLONOSCOPY N/A 12/12/2014    Procedure: COLONOSCOPY;  Surgeon: Bandar Guidry MD;  [facility]     HYSTERECTOMY, PAP NO LONGER INDICATED       INJECT EPIDURAL CERVICAL N/A 9/23/2015    Procedure: INJECT EPIDURAL CERVICAL;  Surgeon: Sawyer Webster MD;  [facility]     PHACOEMULSIFICATION WITH STANDARD INTRAOCULAR LENS IMPLANT Left 11/15/2018    Procedure: PHACOEMULSIFICATION WITH STANDARD INTRAOCULAR LENS IMPLANT LEFT EYE;  Surgeon: Rian Ford MD;  [facility]     PHACOEMULSIFICATION WITH STANDARD INTRAOCULAR LENS IMPLANT Right 11/29/2018    Procedure:  PHACOEMULSIFICATION WITH STANDARD INTRAOCULAR LENS IMPLANT RIGHT EYE;  Surgeon: Rian Ford MD;  [facility]     Past Surgical History reviewed with patient during visit.    Current Outpatient Medications   Medication     acetaminophen (TYLENOL) 325 MG tablet     Cholecalciferol (VITAMIN D) 2000 UNITS tablet     gabapentin (NEURONTIN) 300 MG capsule     IBUPROFEN PO     lisinopril (PRINIVIL/ZESTRIL) 10 MG tablet     Multiple Vitamins-Minerals (MULTIVITAMIN OR)     omega 3 1000 MG CAPS     No current facility-administered medications for this visit.        No Known Allergies    Social History     Socioeconomic History     Marital status:      Spouse name: None     Number of children: None     Years of education: None     Highest education level: None   Social Needs     Financial resource strain: None     Food insecurity - worry: None     Food insecurity - inability: None     Transportation needs - medical: None     Transportation needs - non-medical: None   Occupational History     None   Tobacco Use     Smoking status: Never Smoker     Smokeless tobacco: Never Used   Substance and Sexual Activity     Alcohol use: No     Drug use: No     Sexual activity: No   Other Topics Concern     None   Social History Narrative     None       No family history on file.       ROS: 10 point ROS neg other than the symptoms noted above in the HPI.    Vital Signs: /64   Temp 97  F (36.1  C) (Temporal)   Resp 18   Wt 114 lb 4.8 oz (51.8 kg)   BMI 22.32 kg/m       Examination:  Constitutional:  Alert, well nourished, NAD.  HEENT: Normocephalic, atraumatic.   Pulmonary:  Without shortness of breath, normal effort.   Lymph: no lymphadenopathy to low back or LE.   Integumentary: Skin is free of rashes or lesions.   Cardiovascular:  No pitting edema of BLE.    Psych: Normal affect, no apparent distress    Neurological:  Awake  Alert  Oriented x 3  Speech clear  Cranial nerves II - XII grossly intact  Motor  exam   Hip Flexor:                Right: 5/5  Left:  5/5  Hip Adductor:             Right:  5/5  Left:  5/5  Hip Abductor:             Right:  5/5  Left:  5/5  Gastroc Soleus:        Right:  5/5  Left:  5/5  Tib/Ant:                      Right:  5/5  Left:  5/5  EHL:                          Right:  5/5  Left:  5/5       Sensation normal to bilateral upper and lower extremities.    Reflexes are 2+ in the patellar and Achilles. There is no clonus. Downgoing Babinski.    Reflexes are 2+ in the brachial radialis and triceps. Negative Lori sign bilaterally.    Finger to Nose smooth  Pronator drift negative  Musculoskeletal:  Gait: Able to stand from a seated position. Normal non-antalgic, non-myelopathic gait.  Lumbar examination reveals no tenderness of the spine or paraspinous muscles.  Hip height is symmetrical. Negative SI joint, sciatic notch or greater trochanteric tenderness to palpation bilaterally.  Straight leg raise is negative bilaterally.      Imaging:   AP and lateral x-rays show a severe dextroconvex rotary scoliosis of the lumbar spine.    Assessment/Plan:     Severe thoracolumbar scoliosis with associated back pain     Coby Damon is a 79 year old female.  I did have a discussion with the patient regarding her symptoms.She understands the findings described in her x-rays above.  She knows that she has a history of severe scoliosis.  She is developing worsening back pain.  She is not interested in any surgical options at this time.  I did recommend returning to physical therapy, and I also discussed with her a referral to the pain service, which she did elect to proceed with.  If she does change her mind and seek surgical intervention, I would recommend referral to the [facility].  She voiced agreement and understanding.          Joaquín Aguilar PA-C  [facility]  [address] [address]    Tel [phone]  Pager  [phone]      Again, thank you for allowing me to participate in the care of your patient.        Sincerely,        Joaquín Aguilar PA-C

## 2019-02-12 ENCOUNTER — TELEPHONE (OUTPATIENT)
Dept: PODIATRY | Facility: CLINIC | Age: 79
End: 2019-02-12

## 2019-02-14 ENCOUNTER — HOSPITAL ENCOUNTER (OUTPATIENT)
Dept: PHYSICAL THERAPY | Facility: CLINIC | Age: 79
Setting detail: THERAPIES SERIES
End: 2019-02-14
Attending: PHYSICIAN ASSISTANT
Payer: COMMERCIAL

## 2019-02-14 DIAGNOSIS — M41.25 OTHER IDIOPATHIC SCOLIOSIS, THORACOLUMBAR REGION: ICD-10-CM

## 2019-02-14 PROCEDURE — 97110 THERAPEUTIC EXERCISES: CPT | Mod: GP

## 2019-02-14 PROCEDURE — 97162 PT EVAL MOD COMPLEX 30 MIN: CPT | Mod: GP

## 2019-02-14 NOTE — PROGRESS NOTES
02/14/19 1418   General Information   Type of Visit Initial OP Ortho PT Evaluation   Start of Care Date 02/14/19   Referring Physician Bandar Will MD   Patient/Family Goals Statement To be able to perform ADLs without pain or symptoms   Orders Evaluate and Treat   Date of Order 02/12/19   Insurance Type [employer]   Insurance Comments/Visits Authorized Medicare Advantage (20 authorized)   Medical Diagnosis Idiopathic Thoracolumbar Scoliosis - Severe   Surgical/Medical history reviewed Yes   Precautions/Limitations no known precautions/limitations   General Information Comments Patient has had back pain her whole life and has always lived with it. Her left hip is higher than her right and her right LE is shorter. Only recently her MD discovered that she has scoliosis. She has pain during the day when she's moving and is upright, but doesn't have any pain at night while she's sleep. Patient has had shots in the hip       Present No   Body Part(s)   Body Part(s) Lumbar Spine/SI   Presentation and Etiology   Pertinent history of current problem (include personal factors and/or comorbidities that impact the POC) Patient is a 79 year old female with a chronic history of low back pain. She recently saw a physician regarding her low back pain since it has been progressively getting worse and discovered that she had severe scoliosis (Birch angle approximately 90 degrees). It was recommended to the patient to recieve surgery to correct the scoliosis, however the patient is able to function with the pain, does not experience pain and night and does not want surgery. Therefor, patient is interested in PT as well as possible bracing to manage her symptoms.   Impairments A. Pain;H. Impaired gait;J. Burning;L. Tingling   Functional Limitations perform activities of daily living;perform required work activities;perform desired leisure / sports activities   Symptom Location Low Back, left thigh down to knee,  right glute   How/Where did it occur From insidious onset   Onset date of current episode/exacerbation 01/01/80   Chronicity Chronic   Pain rating (0-10 point scale) Best (/10);Worst (/10)   Best (/10) 0   Worst (/10) 6   Pain quality E. Shooting;D. Burning;B. Dull   Frequency of pain/symptoms C. With activity   Pain/symptoms are: Worse during the day   Pain/symptoms exacerbated by K. Home tasks;L. Work tasks;C. Lifting;D. Carrying;I. Bending;J. ADL   Pain/symptoms eased by I. OTC medication(s);F. Certain positions;G. Heat;H. Cold;J. Braces/supports   Progression of symptoms since onset: Worsened   Prior Level of Function   Prior Level of Function-Mobility Ind   Prior Level of Function-ADLs Ind   Current Level of Function   Current Community Support Other;Family/friend caregiver  (Alone but family in town)   Patient role/employment history F. Retired   Living environment House/townhome   Home/community accessibility Independent with all stairs and mobility   Current equipment-Gait/Locomotion None   Current equipment-ADL None   Fall Risk Screen   Fall screen completed by PT   Have you fallen 2 or more times in the past year? No   Have you fallen and had an injury in the past year? No   Is patient a fall risk? No   Lumbar Spine/SI Objective Findings   Observation Patient has a significant scoliotic curve to the left with left hip elevated and right knee more proximal due to the pelvic shift   Integumentary Unremarkable   Posture Left hip elevated, scoliosis to the left, S curve,    Gait/Locomotion Slow but symmetrical   Flexion ROM Full and Painfree   Extension ROM Limited and painfree   Right Side Bending ROM Limited and painfree   Left Side Bending ROM limited and painfree   Repeated Flexion-Standing ROM No change in symptoms   Hip Flexion (L2) Strength 4+/5   Hip Abduction Strength 5/5   Hip Extension Strength 4+/5   Knee Flexion Strength 5/5   Knee Extension (L3) Strength 4+/5   Ankle Dorsiflexion (L4) Strength  4+/5   Ankle Plantar Flexion (S1) Strength 5/5   Hamstring Flexibility Tight   Quadricep Flexibility Tight   Piriformis Flexibility Unremarkable   SLR Negative   Ely Test Positive   Slump Test Negative    Segmental Mobility Unremarable   Planned Therapy Interventions   Planned Therapy Interventions stretching;strengthening;ROM;neuromuscular re-education;manual therapy;joint mobilization   Planned Therapy Interventions Comment Improving painfree mobility, activity tolerance and strength of the back musculature to reduce paiin and symptoms both in the low back as well as the radiating ones.   Planned Modality Interventions   Planned Modality Interventions Biofeedback;Cryotherapy;Electrical stimulation;Hot packs;Hydrotherapy;TENS;Traction;Ultrasound   Planned Modality Interventions Comments PRN only   Clinical Impression   Criteria for Skilled Therapeutic Interventions Met yes, treatment indicated   PT Diagnosis Low back pain with sciatica   Influenced by the following impairments Low back pain with all functional activities that require rest to complete.   Functional limitations due to impairments Patient cannot do the dishes, get up from bed or move around without pain that requires her to sit.   Clinical Presentation Evolving/Changing   Clinical Presentation Rationale Based on severity, observation, evaluation and clinical judgement   Clinical Decision Making (Complexity) Moderate complexity   Therapy Frequency 1 time/week   Predicted Duration of Therapy Intervention (days/wks) 6 weeks   Risk & Benefits of therapy have been explained Yes   Patient, Family & other staff in agreement with plan of care Yes   Clinical Impression Comments Patient presents with chronic low back pain and a new onset of sciatica as a result of severe scoliosis. It was recommended that the patient have surgery to correct her scoliosis however she is not interested in it. Therefor, strengthening and mobility exercises with PT along with  potential bracing will be implemented to manage the patients symptoms. Along with the LBP, patient presents with right sided glute burning/numbness and left quad shooting symptoms which are likely attributed to sciatica.   ORTHO GOALS   PT Ortho Eval Goals 1;2;3   Ortho Goal 1   Goal Identifier CAMERON   Goal Description Patient will reduce CAMERON score to 11 or less.to demonstrate improvement in overall function.   Target Date 04/04/19   Ortho Goal 2   Goal Identifier Rajan   Goal Description Patient will reduce Rajan score to 4 or less demonstrating improvement in overall function.   Target Date 04/04/19   Ortho Goal 3   Goal Identifier MMT   Goal Description Patient will have 5/5 strength throughout her LEs to demonstrate improvements in strength allowing her to have greater functional abilities.   Target Date 04/04/19   Total Evaluation Time   PT Eval, Moderate Complexity Minutes (16960) 60   PT G-Codes   G-code Mobility: Walking and moving around   Mobility: Walking and Moving Around Current Status () CK   Current Mobility Modifier Rationale Based on severity, observation, evaluation and clinical reasoning.   Mobility: Walking and Moving Around Goal Status () CJ

## 2019-02-14 NOTE — PROGRESS NOTES
[facility]          OUTPATIENT PHYSICAL THERAPY ORTHOPEDIC EVALUATION  PLAN OF TREATMENT FOR OUTPATIENT REHABILITATION  (COMPLETE FOR INITIAL CLAIMS ONLY)  Patient's Last Name, First Name, M.I.  YOB: 1940  Coby Damon    Provider s Name:  [facility]   Medical Record No.  [id number]   Start of Care Date:  02/14/19   Onset Date:  01/01/08   Type:     _X__PT   ___OT   ___SLP Medical Diagnosis:        PT Diagnosis:  Low back pain with sciatica   Visits from SOC:  1      _________________________________________________________________________________  Plan of Treatment/Functional Goals:  stretching, strengthening, ROM, neuromuscular re-education, manual therapy, joint mobilization  Improving painfree mobility, activity tolerance and strength of the back musculature to reduce paiin and symptoms both in the low back as well as the radiating ones.  Biofeedback, Cryotherapy, Electrical stimulation, Hot packs, Hydrotherapy, TENS, Traction, Ultrasound  PRN only  Goals  Goal Identifier: CAMERON  Goal Description: Patient will reduce CAMERON score to 11 or less.to demonstrate improvement in overall function.  Target Date: 04/04/19    Goal Identifier: Rajan  Goal Description: Patient will reduce Rajan score to 4 or less demonstrating improvement in overall function.  Target Date: 04/04/19    Goal Identifier: MMT  Goal Description: Patient will have 5/5 strength throughout her LEs to demonstrate improvements in strength allowing her to have greater functional abilities.  Target Date: 04/04/19                                                           Therapy Frequency:  1 time/week  Predicted Duration of Therapy Intervention:  6 weeks    Jax Bailon, PT                 I CERTIFY THE NEED FOR THESE SERVICES FURNISHED UNDER        THIS PLAN OF TREATMENT AND WHILE UNDER MY CARE     (Physician co-signature of this document indicates review and certification of the therapy  plan).                       Certification Date From:    2/14/2019  Certification Date To:   4/4/2019    Referring Provider:  Bandar adams MD    Initial Assessment        See Epic Evaluation Start of Care Date: 02/14/19

## 2019-02-19 ENCOUNTER — HOSPITAL ENCOUNTER (OUTPATIENT)
Dept: PHYSICAL THERAPY | Facility: CLINIC | Age: 79
Setting detail: THERAPIES SERIES
End: 2019-02-19
Attending: PHYSICIAN ASSISTANT
Payer: COMMERCIAL

## 2019-02-19 PROCEDURE — 97110 THERAPEUTIC EXERCISES: CPT | Mod: GP

## 2019-02-19 NOTE — TELEPHONE ENCOUNTER
Left VM for patient to schedule a new evaluation.          Rita STINSON    [facility]

## 2019-02-26 ENCOUNTER — HOSPITAL ENCOUNTER (OUTPATIENT)
Dept: PHYSICAL THERAPY | Facility: CLINIC | Age: 79
Setting detail: THERAPIES SERIES
End: 2019-02-26
Attending: PHYSICIAN ASSISTANT
Payer: COMMERCIAL

## 2019-02-26 PROCEDURE — 97110 THERAPEUTIC EXERCISES: CPT | Mod: GP

## 2019-03-05 ENCOUNTER — HOSPITAL ENCOUNTER (OUTPATIENT)
Dept: PHYSICAL THERAPY | Facility: CLINIC | Age: 79
Setting detail: THERAPIES SERIES
End: 2019-03-05
Attending: PHYSICIAN ASSISTANT
Payer: COMMERCIAL

## 2019-03-05 PROCEDURE — 97110 THERAPEUTIC EXERCISES: CPT | Mod: GP

## 2019-03-14 ENCOUNTER — HOSPITAL ENCOUNTER (OUTPATIENT)
Dept: PHYSICAL THERAPY | Facility: CLINIC | Age: 79
Setting detail: THERAPIES SERIES
End: 2019-03-14
Attending: PHYSICIAN ASSISTANT
Payer: COMMERCIAL

## 2019-03-14 PROCEDURE — 97110 THERAPEUTIC EXERCISES: CPT | Mod: GP

## 2019-03-15 ENCOUNTER — OFFICE VISIT (OUTPATIENT)
Dept: NEUROSURGERY | Facility: CLINIC | Age: 79
End: 2019-03-15
Payer: COMMERCIAL

## 2019-03-15 VITALS
BODY MASS INDEX: 22.5 KG/M2 | WEIGHT: 115.2 LBS | DIASTOLIC BLOOD PRESSURE: 60 MMHG | TEMPERATURE: 97 F | SYSTOLIC BLOOD PRESSURE: 110 MMHG

## 2019-03-15 DIAGNOSIS — M54.5 LOW BACK PAIN, UNSPECIFIED BACK PAIN LATERALITY, UNSPECIFIED CHRONICITY, WITH SCIATICA PRESENCE UNSPECIFIED: Primary | ICD-10-CM

## 2019-03-15 PROCEDURE — 99213 OFFICE O/P EST LOW 20 MIN: CPT | Performed by: PHYSICIAN ASSISTANT

## 2019-03-15 ASSESSMENT — PAIN SCALES - GENERAL: PAINLEVEL: NO PAIN (0)

## 2019-03-15 NOTE — TELEPHONE ENCOUNTER
[facility] Referral      1. Confirmed address with patient? Yes  2. Confirmed phone number with patient? Yes  3. Confirmed referring provider? Yes  4. Is the PCP the same as the referring provider? No  5. Has the patient been to any previous pain clinics? NO  (If yes, send ALE[facility] with welcome letter)  6. Which insurance are we to bill for this appointment?  [employer]    7. Informed pt of cancellation (48 hour) policy? Yes    REGARDING OPIOID MEDICATIONS: We will always address appropriateness of opioid pain medications, but we generally will not automatically take on a prescribing role. When we do take on prescribing of opioids for chronic pain, it is in collaboration with the referring physician for an intermediate period of time (months), with an expectation that the primary physician or provider will assume the prescribing role if medications are effective at stable doses with demonstrated compliance. Therefore, please do not assume that your prescribing responsibilities end on the day of pain clinic consultation.  8. Informed pt of prescribing policy? Yes    9.Please be aware that once you are established with a pain provider and location, you will need to continue have all future visits with that provider and location. It is best to determine what location is the most convenient for you and schedule with that one.    ** PATIENT INFORMED OF THIS POLICY Yes      9. Referring Provider: CRISTIN GRAY    10. Criteria for Triage Eval:   -Missed/Failed 1st DUAL appointment? N/A   -Medication Focused? N/A   -Mental Health Concerns? (e.g. Recent psych hospitalization/snap shot)? N/A   -Active substance abuse? N/A   -Patient behaviors (e.g. Offensive language/raised voice)? N/A

## 2019-03-15 NOTE — LETTER
3/15/2019         RE: Coby Damon  [address] [facility] [address]        Dear Colleague,    Thank you for referring your patient, Coby Damon, to the [facility]. Please see a copy of my visit note below.    [facility]  Neurosurgery followup:    HPI: 71-year-old female with chronic low back pain due to her severe scoliosis.  When I saw her previously, I arranged for her to have physical therapy, which she thinks has been very helpful.  However, she does note flares of back pain whenever she tries to do any physical labor at home.  She denies any radiating leg pain or paresthesias at this time.  Exam:  Constitutional:  Alert, well nourished, NAD.  HEENT: Normocephalic, atraumatic.   Pulm:  Without shortness of breath   CV:  No pitting edema of BLE.      Neurological:  Awake  Alert  Oriented x 3  Motor exam:        IP Q DF PF EHL  R   5  5   5   5    5  L   5  5   5   5    5     Reflexes are 2+ in the patellar and Achilles. There is no clonus. Downgoing Babinski.      Able to spontaneously move L/E bilaterally  Sensation intact throughout all L/E dermatomes       A/P:  Idiopathic scoliosis  I did have a discussion the patient regarding her symptoms.  At this point, she has been doing very well with physical therapy, but does still have some flares of back pain when she is trying to do any physical activities.  I encouraged her to continue with the physical therapy, and we can also renew the referral for the pain service with [place].  She may benefit from some trigger point injections.  She did wish to proceed with this option.      Joaquín Aguilar PA-C  [facility]  [address]  [address] [place] [id number]    Tel [phone]  Pager [phone]      Again, thank you for allowing me to participate in the care of your patient.        Sincerely,        Joaquín Aguilar PA-C

## 2019-03-15 NOTE — PROGRESS NOTES
[facility]  Neurosurgery followup:    HPI: 71-year-old female with chronic low back pain due to her severe scoliosis.  When I saw her previously, I arranged for her to have physical therapy, which she thinks has been very helpful.  However, she does note flares of back pain whenever she tries to do any physical labor at home.  She denies any radiating leg pain or paresthesias at this time.  Exam:  Constitutional:  Alert, well nourished, NAD.  HEENT: Normocephalic, atraumatic.   Pulm:  Without shortness of breath   CV:  No pitting edema of BLE.      Neurological:  Awake  Alert  Oriented x 3  Motor exam:        IP Q DF PF EHL  R   5  5   5   5    5  L   5  5   5   5    5     Reflexes are 2+ in the patellar and Achilles. There is no clonus. Downgoing Babinski.      Able to spontaneously move L/E bilaterally  Sensation intact throughout all L/E dermatomes       A/P:  Idiopathic scoliosis  I did have a discussion the patient regarding her symptoms.  At this point, she has been doing very well with physical therapy, but does still have some flares of back pain when she is trying to do any physical activities.  I encouraged her to continue with the physical therapy, and we can also renew the referral for the pain service with [place].  She may benefit from some trigger point injections.  She did wish to proceed with this option.      Joaquín Aguilar PA-C  [facility]  [address] [address]    Tel [phone]  Pager [phone]

## 2019-03-16 ENCOUNTER — TELEPHONE (OUTPATIENT)
Dept: PODIATRY | Facility: CLINIC | Age: 79
End: 2019-03-16

## 2019-03-19 ENCOUNTER — HOSPITAL ENCOUNTER (OUTPATIENT)
Dept: PHYSICAL THERAPY | Facility: CLINIC | Age: 79
Setting detail: THERAPIES SERIES
End: 2019-03-19
Attending: PHYSICIAN ASSISTANT
Payer: COMMERCIAL

## 2019-03-19 PROCEDURE — 97110 THERAPEUTIC EXERCISES: CPT | Mod: GP

## 2019-03-19 NOTE — PROGRESS NOTES
[facility] Consultation      This patient is being seen in consultation at the request of her provider Joaquín Aguilar PA-C.    Primary Care Provider is Bandar Will.      Please see the [facility] health questionnaire which the patient completed and reviewed with me in detail    CHIEF COMPLAINT:  Pain  -Degenerative Scoliosis    HISTORY OF PRESENT ILLNESS:  Coby Damon is a 79 year old female with history of low back pain     She has low back pain that radiates into the lateral aspect of her left thigh. She also does a burning sensation in her right hip. She has seen neurosurgery for her scoliosis. She would prefer to not have surgery for it. She states that she always has had low back pain. Her pain used to be related to activities. She states that now she has pain even when she wakes up. She does sleep really well, her pain does not wake her up. No bladder or bowel incontinence related to her back.       Pain Information:   Onset/Progression:  Pain started years ago.   Pain quality: Aching    Pain timing: Intermittent     Pain rating: intensity ranges from 2/10 to 8/10, and averages 5/10 on a 0-10 scale.   Aggravating factors include: standing   Relieving factors include: sitting/doing PT exercises      Past Pain Treatments:    Pain Clinic:   No    PT: Yes, has one more session next week (has been helpful)   Psychologist: No   Relaxation techniques/biofeedback: No   Chiropractor: Yes, was not helpful   Acupuncture: No   Pharmacotherapy:     Opioids: No      Non-opioids:  Yes    TENs Unit: No   Injections: Yes, in [place]. Last injection was in 2012, epidurals.    Self-care:   Yes, ice packs during the day, heating pad at night, hot showers   Surgeries related to pain: No     Current Pain Relevant Medications:    Gabapentin 300mg at night  Ibuprofen 200mg-6-8/day lately (before was only taking 2 at night)      Previous Pain Relevant Medications: (H--helped;  "HI--Helped initially; [facility]--Somewhat helpful; NH--No help; W--worse; SE--side effects; ?--Unsure if helpful)   NOTE: This medication information taken from patient's intake form, not medical records.    Opiates: none   NSAIDS: Ibuprofen H    Muscle Relaxants: was on one years ago \"hated it\" \"felt goofy\"   Anti-migraine mediations: none   Anti-depressants: none   Sleep aids: none   Anxiolytics: was on Valium years ago \"felt goofy\"   Neuropathics:  Gabapentin NH    Topicals: none   Other medications not covered above: Tylenol NH      PAST MEDICAL HISTORY:   Past Medical History:   Diagnosis Date     Breast cancer (H) 2004    lumpectomy and 33 lymph nodes removed.     Shingles          CURRENT FAMILY/SOCIAL SITUATION:  Living situation: Patient is . She lives by herself  Support system: she has 2 children ages 53 and 50. She reports having a good support system.   Occupation: she is not currently working. She was previously a laundress.   Current stressors: none  Safety concerns: none    FAMILY MEDICAL HISTORY:  Chronic pain: Yes, parents and brother  Family history of headaches:  NO    HEALTH AND LIFESTYLE PRACTICES:  Have you ever had any problems with alcohol or drug use? no  Have you ever felt you should cut down your use of alcohol/drugs?no  Have people ever annoyed you by criticizing your alcohol/drug use? no  Have you ever felt bad or guilty about your alcohol/drug use? no  Have you ever had a drink or taken a drug first thing in the morning for an eye-opener/hangover? no    SLEEP:  Do you snore heavily? no  Do you wake up feeling rested? yes  How many hours of sleep do you average per day?  7 1/2  What keep you from sleep? n/a  Have you been diagnosed with sleep apnea? no  Do you wear a CPAP? no      ALLERGIES:  No Known Allergies    MEDICATIONS:  Current Outpatient Medications   Medication Sig Dispense Refill     acetaminophen (TYLENOL) 325 MG tablet Take 325-650 mg by mouth every 6 hours as needed   " "    Cholecalciferol (VITAMIN D) 2000 UNITS tablet Take 2,000 Units by mouth daily       gabapentin (NEURONTIN) 300 MG capsule TAKE 1 CAPSULE BY MOUTH TWICE DAILY **NEEDS  LABS  FOR  FURTHER  REFILLS** 180 capsule 0     IBUPROFEN PO Take 400 mg by mouth every 6 hours as needed for moderate pain       lisinopril (PRINIVIL/ZESTRIL) 10 MG tablet Take 1 tablet (10 mg) by mouth daily 90 tablet 3     Multiple Vitamins-Minerals (MULTIVITAMIN OR)        omega 3 1000 MG CAPS Take 1 g by mouth daily 90 capsule          REVIEW OF SYSTEMS:   Constitutional:  Negative  Eyes/Head: Negative  Ears/Nose/Throat: Negative  Allergy/Immune: Negative  Skin:Negative  Hematologic/Lymphatic/Immunologic:Negative  Respiratory: Negative  Cardiovascular: Negative  Gastrointestinal: Negative  Endocrine: Negative  Musculoskeletal: Back pain  Urinary:  Negative   Any bowel or bladder incontinence: Denies   Neurologic: Negative  Psychiatric: Negative      Any illicit drug use: none  EtOH use: 1-2 glasses of wine at night  Caffeine use: 2 cups of coffee/day  Nicotine use: none  Any use of prescriptions other than how they were prescribed: none    PHYSICAL EXAM    Vitals:   /72   Temp 98.2  F (36.8  C) (Temporal)   Ht 1.549 m (5' 1\")   Wt 52.4 kg (115 lb 8 oz)   BMI 21.82 kg/m    Body mass index is 21.82 kg/m .  5' 1\"  115 lbs 8 oz    Appearance:     A&O. Patient is appropriate.   Patient is in NAD.   Patient is well groomed and appears stated age.     HEENT:   Normocephalic, atraumatic, sclera, conjunctiva and pharynx normal. Pupils are equal, round and reactive to light. Hearing is adequate for exam. Uvula rises with phonation.   Neck: Supple. No deformities or adenopathy  Cardiovascular:  Heart has a regular rate and rhythm. Audible S1 and S2. No murmurs auscultated. No edema on bilateral lower extremities.   Respiratory: Lungs are clear to auscultation bilaterally. No wheezes or crackles.  Abdominal/Pelvic:   Soft, non-tender with good " bowel sounds, no palpable organomegaly.  Skin:  No rashes, erythema, breakdowns, lesions to exposed skin.   Hematologic:  No bruises, petechiae or ecchymosis to exposed areas.  Psychiatric:  mentation appears normal., affect and mood normal  Musculoskeletal:  Posture upright, shoulders and pelvis are leveled.   Deltoid: R: 5/5 L: 5/5  Biceps: R: 5/5 L: 5/5  Triceps: R: 5/5 L: 5/5  Intrinsic hand: R: 5/5   L: 5/5  Hip flexion: R: 5/5 L: 5/5  Knee ext: R: 5/5 L: 5/5  Knee flex: R: 5/5 L: 5/5  Dorsiflexion: R: 5/5 L: 5/5  Plantarflexion: R: 5/5 L: 4/5    Cervical spine:   Flex:  45 degrees, pain free   Ext: 45 degrees, pain free   Rotation to right: 80 degrees, pain free   Rotation to left: 80 degrees, pain free   Tenderness in the cervical spine at midline. No   Tenderness in the cervical paraspinal muscles. No  Thoracic spine:    Tenderness in the thoracic spine at midline. No   Tenderness in the thoracic paraspinal muscles. No  Lumbar/Sacral spine:   Flexion:  60 degrees, pain free   Ext: 35 degrees, pain free   Rotation/ext to right: painful    Rotation/ext to left: pain free   Tenderness in the lumbar spine at midline. Yes   Tenderness in the lumbar paraspinal muscles. Yes   Straight leg exam:    Right: negative    Left: negative   Anni/Dane's test:      Right: negative     Left:  negative   Tenderness over SI joint:      Right: negative     Left:  negative   Tenderness over piriformis:     Right: negative    Left:  negative   Tenderness over Trochanteric Bursa:     Right: positive    Left: negative      Gait pattern:  Able to walk on the heels and toes. Patient has antalgic gait favoring neither side.     Neurological:   Deep Tendon Reflex exam:   Biceps:     R:  2/4   L: 2/4   Brachioradialis   R:  2/4   L: 2/4:   Patella:  R:  2/4   L: 2/4   Achilles:  R:  2/4   L: 2/4    Sensory exam:   Light touch: normal bilateral upper and lower extremities    Vibration: normal in bilateral upper extremities and  bilateral lower extremities   Sharp: normal in bilateral upper extremities and left lower extremity. Decreased in right lower extremity.       Previous Diagnostic Tests:   Imaging Studies:   LUMBAR SPINE 2-3 VIEWS   1/30/2019 11:15 AM      HISTORY: Lumbar back pain with radiculopathy affecting left lower  extremity.     COMPARISON: CT abdomen and pelvis dated 6/9/2014. DEXA scan dated  11/21/2014.     FINDINGS: There are five non-rib bearing lumbar type vertebrae. There  is severe rotatory dextroconvex scoliosis of the thoracolumbar spine  centered at approximately L2. Asymmetric disc height loss is seen  throughout the lower thoracic and lumbar spine worse on the left at  T11-T12, T12-L1, L1-L2, L2-L3 and L3-L4 and on the right at T10-11.  There is dextro-listhesis of L3 on L4 measuring 1.6 cm. Facet  arthropathy is seen throughout the lumbar spine but is worst in the  left upper to mid lumbar spine and on the right in the lower lumbar  spine. No definite fracture is seen. There is atherosclerosis.                                                                      IMPRESSION:  1. 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 This was also seen on prior CT dated 6/9/2014.  2. Dextro-listhesis of L3 on L4 is again noted.  3. Multilevel degenerative disc and facet disease.  4. Atherosclerosis.  5. No definite fracture is seen.     FAITH GRUBER MD      [place] Board of Pharmacy Data Base Reviewed:    YES; No concern for abuse or misuse of controlled medications based on this report.         ASSESSMENT:   Coby Damon is a 79 year old female who presents today with the complaints of:    1. Low back pain  2. Myofascial pain  3. Scoliosis     Pain is longstanding. Her muscles get very tight at times. She is very interested in trigger point injections. She is also interested in topical pain relief.     PLAN:    Diagnosis reviewed, treatment option addressed, and risk/benefits  discussed.  Self-care instructions given.  I am recommending a multidisciplinary treatment plan to help this patient better manage her pain.       1. Physical Therapy:  Continue current plan  2. Diagnostic Studies:  Not at this time  3. Medication Management:   1. Lidoderm patches: apply 1-3 patches to area of pain. On for 12 hours off for 12 hours.   2. We could consider an increase in her Gabapentin or start a muscle relaxant  4. Potential procedures: Will plan for trigger point injections  5. Recommendations to PCP: see above    Follow up: will call for trigger point injections     TIME SPENT:   A total of 30  minutes was spent on the patient today, greater than 50% of that time was spent on face to face counseling and care coordination regarding diagnoses and treatment options as mentioned above.    I would like to thank Joaquín Aguilar PA-C for allowing me to participate in the management of this patient.     Lizeth Munoz PA-C  [facility]

## 2019-03-20 ENCOUNTER — OFFICE VISIT (OUTPATIENT)
Dept: PODIATRY | Facility: CLINIC | Age: 79
End: 2019-03-20
Payer: COMMERCIAL

## 2019-03-20 VITALS
DIASTOLIC BLOOD PRESSURE: 72 MMHG | SYSTOLIC BLOOD PRESSURE: 104 MMHG | HEIGHT: 61 IN | WEIGHT: 115.5 LBS | BODY MASS INDEX: 21.81 KG/M2 | TEMPERATURE: 98.2 F

## 2019-03-20 DIAGNOSIS — M54.50 CHRONIC BILATERAL LOW BACK PAIN WITHOUT SCIATICA: Primary | ICD-10-CM

## 2019-03-20 DIAGNOSIS — M79.18 MYOFASCIAL PAIN: ICD-10-CM

## 2019-03-20 DIAGNOSIS — G89.29 CHRONIC BILATERAL LOW BACK PAIN WITHOUT SCIATICA: Primary | ICD-10-CM

## 2019-03-20 DIAGNOSIS — M41.9 SCOLIOSIS OF LUMBAR SPINE, UNSPECIFIED SCOLIOSIS TYPE: ICD-10-CM

## 2019-03-20 PROCEDURE — 99203 OFFICE O/P NEW LOW 30 MIN: CPT | Performed by: PHYSICIAN ASSISTANT

## 2019-03-20 RX ORDER — LIDOCAINE 50 MG/G
PATCH TOPICAL
Qty: 90 PATCH | Refills: 0 | Status: SHIPPED | OUTPATIENT
Start: 2019-03-20 | End: 2021-01-08

## 2019-03-20 ASSESSMENT — MIFFLIN-ST. JEOR: SCORE: 941.28

## 2019-03-20 ASSESSMENT — PAIN SCALES - GENERAL: PAINLEVEL: MODERATE PAIN (4)

## 2019-03-20 NOTE — LETTER
3/20/2019         RE: Coby Damon  [address] [facility] [address]        Dear Colleague,    Thank you for referring your patient, Coby Damon, to the [facility]. Please see a copy of my visit note below.    [facility] Consultation      This patient is being seen in consultation at the request of her provider Joaquín Aguilar PA-C.    Primary Care Provider is Bandar Will.      Please see the [facility] health questionnaire which the patient completed and reviewed with me in detail    CHIEF COMPLAINT:  Pain  -Degenerative Scoliosis    HISTORY OF PRESENT ILLNESS:  Coby Damon is a 79 year old female with history of low back pain     She has low back pain that radiates into the lateral aspect of her left thigh. She also does a burning sensation in her right hip. She has seen neurosurgery for her scoliosis. She would prefer to not have surgery for it. She states that she always has had low back pain. Her pain used to be related to activities. She states that now she has pain even when she wakes up. She does sleep really well, her pain does not wake her up. No bladder or bowel incontinence related to her back.       Pain Information:   Onset/Progression:  Pain started years ago.   Pain quality: Aching    Pain timing: Intermittent     Pain rating: intensity ranges from 2/10 to 8/10, and averages 5/10 on a 0-10 scale.   Aggravating factors include: standing   Relieving factors include: sitting/doing PT exercises      Past Pain Treatments:    Pain Clinic:   No    PT: Yes, has one more session next week (has been helpful)   Psychologist: No   Relaxation techniques/biofeedback: No   Chiropractor: Yes, was not helpful   Acupuncture: No   Pharmacotherapy:     Opioids: No      Non-opioids:  Yes    TENs Unit: No   Injections: Yes, in [place]. Last injection was in 2012, epidurals.    Self-care:   Yes, ice packs during the day, heating pad  "at night, hot showers   Surgeries related to pain: No     Current Pain Relevant Medications:    Gabapentin 300mg at night  Ibuprofen 200mg-6-8/day lately (before was only taking 2 at night)      Previous Pain Relevant Medications: (H--helped; HI--Helped initially; [facility]--Somewhat helpful; NH--No help; W--worse; SE--side effects; ?--Unsure if helpful)   NOTE: This medication information taken from patient's intake form, not medical records.    Opiates: none   NSAIDS: Ibuprofen H    Muscle Relaxants: was on one years ago \"hated it\" \"felt goofy\"   Anti-migraine mediations: none   Anti-depressants: none   Sleep aids: none   Anxiolytics: was on Valium years ago \"felt goofy\"   Neuropathics:  Gabapentin NH    Topicals: none   Other medications not covered above: Tylenol NH      PAST MEDICAL HISTORY:   Past Medical History:   Diagnosis Date     Breast cancer (H) 2004    lumpectomy and 33 lymph nodes removed.     Shingles          CURRENT FAMILY/SOCIAL SITUATION:  Living situation: Patient is . She lives by herself  Support system: she has 2 children ages 53 and 50. She reports having a good support system.   Occupation: she is not currently working. She was previously a laundress.   Current stressors: none  Safety concerns: none    FAMILY MEDICAL HISTORY:  Chronic pain: Yes, parents and brother  Family history of headaches:  NO    HEALTH AND LIFESTYLE PRACTICES:  Have you ever had any problems with alcohol or drug use? no  Have you ever felt you should cut down your use of alcohol/drugs?no  Have people ever annoyed you by criticizing your alcohol/drug use? no  Have you ever felt bad or guilty about your alcohol/drug use? no  Have you ever had a drink or taken a drug first thing in the morning for an eye-opener/hangover? no    SLEEP:  Do you snore heavily? no  Do you wake up feeling rested? yes  How many hours of sleep do you average per day?  7 1/2  What keep you from sleep? n/a  Have you been diagnosed with sleep " "apnea? no  Do you wear a CPAP? no      ALLERGIES:  No Known Allergies    MEDICATIONS:  Current Outpatient Medications   Medication Sig Dispense Refill     acetaminophen (TYLENOL) 325 MG tablet Take 325-650 mg by mouth every 6 hours as needed       Cholecalciferol (VITAMIN D) 2000 UNITS tablet Take 2,000 Units by mouth daily       gabapentin (NEURONTIN) 300 MG capsule TAKE 1 CAPSULE BY MOUTH TWICE DAILY **NEEDS  LABS  FOR  FURTHER  REFILLS** 180 capsule 0     IBUPROFEN PO Take 400 mg by mouth every 6 hours as needed for moderate pain       lisinopril (PRINIVIL/ZESTRIL) 10 MG tablet Take 1 tablet (10 mg) by mouth daily 90 tablet 3     Multiple Vitamins-Minerals (MULTIVITAMIN OR)        omega 3 1000 MG CAPS Take 1 g by mouth daily 90 capsule          REVIEW OF SYSTEMS:   Constitutional:  Negative  Eyes/Head: Negative  Ears/Nose/Throat: Negative  Allergy/Immune: Negative  Skin:Negative  Hematologic/Lymphatic/Immunologic:Negative  Respiratory: Negative  Cardiovascular: Negative  Gastrointestinal: Negative  Endocrine: Negative  Musculoskeletal: Back pain  Urinary:  Negative   Any bowel or bladder incontinence: Denies   Neurologic: Negative  Psychiatric: Negative      Any illicit drug use: none  EtOH use: 1-2 glasses of wine at night  Caffeine use: 2 cups of coffee/day  Nicotine use: none  Any use of prescriptions other than how they were prescribed: none    PHYSICAL EXAM    Vitals:   /72   Temp 98.2  F (36.8  C) (Temporal)   Ht 1.549 m (5' 1\")   Wt 52.4 kg (115 lb 8 oz)   BMI 21.82 kg/m     Body mass index is 21.82 kg/m .  5' 1\"  115 lbs 8 oz    Appearance:     A&O. Patient is appropriate.   Patient is in NAD.   Patient is well groomed and appears stated age.     HEENT:   Normocephalic, atraumatic, sclera, conjunctiva and pharynx normal. Pupils are equal, round and reactive to light. Hearing is adequate for exam. Uvula rises with phonation.   Neck: Supple. No deformities or adenopathy  Cardiovascular:  Heart has " a regular rate and rhythm. Audible S1 and S2. No murmurs auscultated. No edema on bilateral lower extremities.   Respiratory: Lungs are clear to auscultation bilaterally. No wheezes or crackles.  Abdominal/Pelvic:   Soft, non-tender with good bowel sounds, no palpable organomegaly.  Skin:  No rashes, erythema, breakdowns, lesions to exposed skin.   Hematologic:  No bruises, petechiae or ecchymosis to exposed areas.  Psychiatric:  mentation appears normal., affect and mood normal  Musculoskeletal:  Posture upright, shoulders and pelvis are leveled.   Deltoid: R: 5/5 L: 5/5  Biceps: R: 5/5 L: 5/5  Triceps: R: 5/5 L: 5/5  Intrinsic hand: R: 5/5   L: 5/5  Hip flexion: R: 5/5 L: 5/5  Knee ext: R: 5/5 L: 5/5  Knee flex: R: 5/5 L: 5/5  Dorsiflexion: R: 5/5 L: 5/5  Plantarflexion: R: 5/5 L: 4/5    Cervical spine:   Flex:  45 degrees, pain free   Ext: 45 degrees, pain free   Rotation to right: 80 degrees, pain free   Rotation to left: 80 degrees, pain free   Tenderness in the cervical spine at midline. No   Tenderness in the cervical paraspinal muscles. No  Thoracic spine:    Tenderness in the thoracic spine at midline. No   Tenderness in the thoracic paraspinal muscles. No  Lumbar/Sacral spine:   Flexion:  60 degrees, pain free   Ext: 35 degrees, pain free   Rotation/ext to right: painful    Rotation/ext to left: pain free   Tenderness in the lumbar spine at midline. Yes   Tenderness in the lumbar paraspinal muscles. Yes   Straight leg exam:    Right: negative    Left: negative   Anni/Dane's test:      Right: negative     Left:  negative   Tenderness over SI joint:      Right: negative     Left:  negative   Tenderness over piriformis:     Right: negative    Left:  negative   Tenderness over Trochanteric Bursa:     Right: positive    Left: negative      Gait pattern:  Able to walk on the heels and toes. Patient has antalgic gait favoring neither side.     Neurological:   Deep Tendon Reflex exam:   Biceps:     R:  2/4    L: 2/4   Brachioradialis   R:  2/4   L: 2/4:   Patella:  R:  2/4   L: 2/4   Achilles:  R:  2/4   L: 2/4    Sensory exam:   Light touch: normal bilateral upper and lower extremities    Vibration: normal in bilateral upper extremities and bilateral lower extremities   Sharp: normal in bilateral upper extremities and left lower extremity. Decreased in right lower extremity.       Previous Diagnostic Tests:   Imaging Studies:   LUMBAR SPINE 2-3 VIEWS   1/30/2019 11:15 AM      HISTORY: Lumbar back pain with radiculopathy affecting left lower  extremity.     COMPARISON: CT abdomen and pelvis dated 6/9/2014. DEXA scan dated  11/21/2014.     FINDINGS: There are five non-rib bearing lumbar type vertebrae. There  is severe rotatory dextroconvex scoliosis of the thoracolumbar spine  centered at approximately L2. Asymmetric disc height loss is seen  throughout the lower thoracic and lumbar spine worse on the left at  T11-T12, T12-L1, L1-L2, L2-L3 and L3-L4 and on the right at T10-11.  There is dextro-listhesis of L3 on L4 measuring 1.6 cm. Facet  arthropathy is seen throughout the lumbar spine but is worst in the  left upper to mid lumbar spine and on the right in the lower lumbar  spine. No definite fracture is seen. There is atherosclerosis.                                                                      IMPRESSION:  1. 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 This was also seen on prior CT dated 6/9/2014.  2. Dextro-listhesis of L3 on L4 is again noted.  3. Multilevel degenerative disc and facet disease.  4. Atherosclerosis.  5. No definite fracture is seen.     FAITH GRUBER MD      [place] Board of Pharmacy Data Base Reviewed:    YES; No concern for abuse or misuse of controlled medications based on this report.         ASSESSMENT:   Coby Damon is a 79 year old female who presents today with the complaints of:    1. Low back pain  2. Myofascial pain  3. Scoliosis     Pain  is longstanding. Her muscles get very tight at times. She is very interested in trigger point injections. She is also interested in topical pain relief.     PLAN:    Diagnosis reviewed, treatment option addressed, and risk/benefits discussed.  Self-care instructions given.  I am recommending a multidisciplinary treatment plan to help this patient better manage her pain.       1. Physical Therapy:  Continue current plan  2. Diagnostic Studies:  Not at this time  3. Medication Management:   1. Lidoderm patches: apply 1-3 patches to area of pain. On for 12 hours off for 12 hours.   2. We could consider an increase in her Gabapentin or start a muscle relaxant  4. Potential procedures: Will plan for trigger point injections  5. Recommendations to PCP: see above    Follow up: will call for trigger point injections     TIME SPENT:   A total of 30  minutes was spent on the patient today, greater than 50% of that time was spent on face to face counseling and care coordination regarding diagnoses and treatment options as mentioned above.    I would like to thank Joaquín Aguilar PA-C for allowing me to participate in the management of this patient.     Lizeth Munoz PA-C  [facility]        Again, thank you for allowing me to participate in the care of your patient.        Sincerely,        Lizeth Munoz PA-C

## 2019-03-20 NOTE — PATIENT INSTRUCTIONS
After Visit Instructions:     Thank you for coming to [facility] for your care. It is my goal to partner with you to help you reach your optimal state of health.     I am recommending multidisciplinary care at this time.  The focus of care will be to continue gradual rehabilitation and pain management with medication adjustments as needed.    Continue daily self-care, identifying contributing factors, and monitoring variations in pain level. Continue to integrate self-care into your life.      1. Schedule physical therapy assessment/visit: continue your current plan  2. Follow-up with Lizeth Munoz PA-C, I will call you to schedule this.  3. Procedures recommended: plan for trigger point injections once I get started doing those here in [place]   4. Medication recommendations:   1. Lidoderm patches: apply 1-3 patches to area of pain. On for 12 hours off for 12 hours.   2. We could consider a muscle relaxant or an increase in your gabapentin in the future.       Lizeth Munoz PA-C  [facility]    Contact information: [facility]  Clinic Number:  [phone]   Call this number with any questions about your care and for scheduling assistance. Calls are returned Monday through Friday between 8 AM and 4:30 PM. We usually get back to you within 2 business days depending on the issue/request.           Medication Refills Policy:    For non-narcotic medications, please your pharmacy directly to request a refill and the pharmacy will call the Pain Management Center for authorization. Please allow 3-4 days for these refills.    For narcotic refills, call the nurse triage line and leave a message requesting your refill along with the name of the pharmacy that you use. Narcotic prescriptions will be mailed to your pharmacy or you may pick them up at the clinic.  Please call 7-10 days before your refill is due  The above policy allows  adequate time so that you do not run out of medication.    No Show - Late Cancellation - Late Arrival Policy  We believe regular attendance is key to your success in our program.    Any time you are unable to keep your appointment we ask that you call us at least 24 hours in advance to let us know. This will allow us to offer the appointment time to another patient. The following is our policy for missed appointments. This also applies to appointments cancelled with less than 24 hours notice.    After missing 3 appointments without calling first, we will cancel all of your future appointments at [facility].    At that point, you will not be able to resume services unless approved by your care team  We understand that unforseen circumstances arise, however, out of respect for all concerned and to provide this appointment to another patient, this policy will be enforced.    Please note that most follow up appointments are 30 minutes long. If you arrive late, your provider may not be able to see you for the entire 30 minutes. Please also note that if you arrive more than 15 minutes for any appointment, it may be rescheduled.

## 2019-03-26 ENCOUNTER — HOSPITAL ENCOUNTER (OUTPATIENT)
Dept: PHYSICAL THERAPY | Facility: CLINIC | Age: 79
Setting detail: THERAPIES SERIES
End: 2019-03-26
Attending: PHYSICIAN ASSISTANT
Payer: COMMERCIAL

## 2019-03-26 PROCEDURE — 97110 THERAPEUTIC EXERCISES: CPT | Mod: GP

## 2019-03-26 NOTE — PROGRESS NOTES
Outpatient Physical Therapy Discharge Note     Patient: Coby Damon  : 1940    Beginning/End Dates of Reporting Period:  2019 to 3/26/2019    Referring Provider: Bandar Will MD    Therapy Diagnosis: Low Back Pain associated with Scoliosis     Client Self Report: Patient reports that this morning she cleaned her bathroom which caused her back pain to increase so she arrived today sore. Patient reports that she met with her PA who said that she can recieve an injection or epidural in mid-April because her back pain does not relant. Patient also reports that tomorow she will be starting her strength and balance group class.    Objective Measurements:  Objective Measure: CAMERON  Details: 9  Objective Measure: Rajan                                 Outcome Measures (most recent score):  Rajan STarT Sub-Score (Q5-9): 2  Rajan STarT Total Score (all 9): 4  Oswestry Score (%): 20 %    Goals:  Goal Identifier CAMERON   Goal Description Patient will reduce CAMERON score to 11 or less.to demonstrate improvement in overall function.   Target Date 19   Date Met  19   Progress: Patient reduced her score on the CAMERON to 9.     Goal Identifier Rajan   Goal Description Patient will reduce Rajan score to 4 or less demonstrating improvement in overall function.   Target Date 19   Date Met  19   Progress: Patient reduced her score on the Rajan 4.     Goal Identifier MMT   Goal Description Patient will have 5/5 strength throughout her LEs to demonstrate improvements in strength allowing her to have greater functional abilities.   Target Date 19   Date Met      Progress: Patient did improve all LE musculature to 5/5 strength except for hip flexion and knee flexion which both continue to demonstrate 4+/5.       Progress Toward Goals:   Progress this reporting period: Patient demonstrated improvements in strength and functional outcomes (Rajan and CAMERON). Most of all patient demonstrated a slight  reduction in low back pain which was most significant during and immediately after therapy but also overall was a general decrease in low back pain.          Plan:  Discharge from therapy.    Discharge:    Reason for Discharge: Patient has met most goals. Change in medical status.    Equipment Issued: None    Discharge Plan: Patient to continue home program.  Other services: Lumbar Injection in mid-April.

## 2019-04-05 ENCOUNTER — TELEPHONE (OUTPATIENT)
Dept: PODIATRY | Facility: CLINIC | Age: 79
End: 2019-04-05

## 2019-04-09 ENCOUNTER — TELEPHONE (OUTPATIENT)
Dept: PODIATRY | Facility: CLINIC | Age: 79
End: 2019-04-09

## 2019-04-09 NOTE — TELEPHONE ENCOUNTER
Contacted patient and discussed TPIs. Informed her that it will be a couple more weeks until Lizeth will start doing these in clinic. She is very willing to wait. She was appreciative of the return call.  Ximena Macias CMA

## 2019-04-09 NOTE — TELEPHONE ENCOUNTER
Patient is calling to schedule TPI, she states that she would like to schedule these with Lizeth. Per patient Lizeth told her she can schedule these in April with her.      Routing to review and advise if TPI can be scheduled with Lizeth now.       Rita STINSON    [facility]

## 2019-04-26 ENCOUNTER — OFFICE VISIT (OUTPATIENT)
Dept: PODIATRY | Facility: CLINIC | Age: 79
End: 2019-04-26
Payer: COMMERCIAL

## 2019-04-26 VITALS
SYSTOLIC BLOOD PRESSURE: 110 MMHG | DIASTOLIC BLOOD PRESSURE: 66 MMHG | BODY MASS INDEX: 21.9 KG/M2 | WEIGHT: 116 LBS | HEIGHT: 61 IN | TEMPERATURE: 98.8 F

## 2019-04-26 DIAGNOSIS — M79.18 MYOFASCIAL PAIN: Primary | ICD-10-CM

## 2019-04-26 PROCEDURE — 20552 NJX 1/MLT TRIGGER POINT 1/2: CPT | Performed by: PSYCHIATRY & NEUROLOGY

## 2019-04-26 ASSESSMENT — PAIN SCALES - GENERAL: PAINLEVEL: MODERATE PAIN (5)

## 2019-04-26 ASSESSMENT — MIFFLIN-ST. JEOR: SCORE: 943.55

## 2019-04-26 NOTE — LETTER
4/26/2019         RE: Coby Damon  [address] [facility] [address]        Dear Colleague,    Thank you for referring your patient, Coby Damon, to the [facility]. Please see a copy of my visit note below.    Pre procedure Diagnosis: myofascial pain   Post procedure Diagnosis: Same  Procedure performed: trigger point injections  Anesthesia: none  Complications: none  Operators:  Lizeth Munoz PA-C and Janine Benedict MD     Indications:   Coby Damon is a 79 year old female with a history of low back pain.  Exam shows myofascial pain of the muscle groups listed below and they have tried conservative treatment including medications and physical therapy.    Options/alternatives, benefits and risks were discussed with the patient including bleeding, infection, tissue trauma and pnuemothorax.  Questions were answered to her satisfaction and she agrees to proceed. Voluntary informed consent was obtained and signed.     Vitals were reviewed: Yes  Allergies were reviewed:  Yes   Medications were reviewed:  Yes   Pre-procedure pain score: 5/10    Procedure:  After getting informed consent, a Pause for the Cause was performed.    Trigger points were identified by patient, and marked when appropriate.  The area was prepped with Chloroprep.    Using clean technique, injections were completed using a 25G, 1.5 inch needle.  After negative aspiration, injection was completed.  A total of 4 locations were injected.  When possible, tissue was retracted from the chest wall to avoid lung injury.    Muscle groups injected:  Lumbar paraspinals    Injection solution contained:  4ml of 1% lidocaine and 4ml of 0.5% bupivacaine.    Hemostasis was achieved, the area was cleaned, and bandaids were placed when appropriate.  The patient tolerated the procedure well.  Breath sounds were normal.      Post-procedure pain score: 0/10  Follow-up includes:   -repeat prn    Lizeth Munoz PA-C  [facility]      I was present and involved with the entire procedure.  Janine Benedict MD  [facility]       Again, thank you for allowing me to participate in the care of your patient.        Sincerely,        Veda Benedict MD

## 2019-04-26 NOTE — PROGRESS NOTES
Pre procedure Diagnosis: myofascial pain   Post procedure Diagnosis: Same  Procedure performed: trigger point injections  Anesthesia: none  Complications: none  Operators:  Lizeth Munoz PA-C and Janine Benedict MD     Indications:   Coby Damon is a 79 year old female with a history of low back pain.  Exam shows myofascial pain of the muscle groups listed below and they have tried conservative treatment including medications and physical therapy.    Options/alternatives, benefits and risks were discussed with the patient including bleeding, infection, tissue trauma and pnuemothorax.  Questions were answered to her satisfaction and she agrees to proceed. Voluntary informed consent was obtained and signed.     Vitals were reviewed: Yes  Allergies were reviewed:  Yes   Medications were reviewed:  Yes   Pre-procedure pain score: 5/10    Procedure:  After getting informed consent, a Pause for the Cause was performed.    Trigger points were identified by patient, and marked when appropriate.  The area was prepped with Chloroprep.    Using clean technique, injections were completed using a 25G, 1.5 inch needle.  After negative aspiration, injection was completed.  A total of 4 locations were injected.  When possible, tissue was retracted from the chest wall to avoid lung injury.    Muscle groups injected:  Lumbar paraspinals    Injection solution contained:  4ml of 1% lidocaine and 4ml of 0.5% bupivacaine.    Hemostasis was achieved, the area was cleaned, and bandaids were placed when appropriate.  The patient tolerated the procedure well.  Breath sounds were normal.      Post-procedure pain score: 0/10  Follow-up includes:   -repeat prn    [facility]      I was present and involved with the entire procedure.  Janine Benedict MD  [facility]

## 2019-04-26 NOTE — PATIENT INSTRUCTIONS
[facility]   Post Procedure Instructions    Today you had:  trigger point injections       Medications used:  lidocaine   bupivicaine         Go to the emergency room if you develop any shortness of breath    Monitor the injection sites for signs and symptoms of infection-fever, chills, redness, swelling, warmth, or drainage to areas.    You may have soreness at injection sites for up to 24 hours.    You may apply ice to the painful areas to help minimize the discomfort of the needle pokes.    Do not apply heat to sites for at least 12 hours.    You may use anti-inflammatory medications or Tylenol for pain control if necessary    Pain Clinic phone number during work hours Monday-Friday:  [phone]    After hours provider line: [phone]

## 2019-05-19 DIAGNOSIS — M54.5 LOW BACK PAIN, UNSPECIFIED BACK PAIN LATERALITY, UNSPECIFIED CHRONICITY, WITH SCIATICA PRESENCE UNSPECIFIED: ICD-10-CM

## 2019-05-21 RX ORDER — GABAPENTIN 300 MG/1
CAPSULE ORAL
Qty: 180 CAPSULE | Refills: 0 | Status: SHIPPED | OUTPATIENT
Start: 2019-05-21 | End: 2019-09-30

## 2019-05-21 NOTE — TELEPHONE ENCOUNTER
Requested Prescriptions   Pending Prescriptions Disp Refills     gabapentin (NEURONTIN) 300 MG capsule [Pharmacy Med Name: GABAPENTIN 300MG    CAP] 180 capsule 0     Sig: TAKE 1 CAPSULE BY MOUTH TWICE DAILY NEEDS  LABS  FOR  FURTHER  REFILLS       There is no refill protocol information for this order        Last Written Prescription Date:  11/20/2018  Last Fill Quantity: 180,  # refills: 0   Last office visit: 1/30/2019 with prescribing provider:     Future Office Visit:      Routing refill request to provider for review/approval because:  Drug not on the G refill protocol     Whitney Sims RN

## 2019-07-06 ENCOUNTER — OFFICE VISIT (OUTPATIENT)
Dept: URGENT CARE | Facility: RETAIL CLINIC | Age: 79
End: 2019-07-06
Payer: COMMERCIAL

## 2019-07-06 VITALS
OXYGEN SATURATION: 97 % | SYSTOLIC BLOOD PRESSURE: 110 MMHG | TEMPERATURE: 96.7 F | DIASTOLIC BLOOD PRESSURE: 71 MMHG | HEART RATE: 64 BPM
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
## 2019-07-06 PROCEDURE — 99213 OFFICE O/P EST LOW 20 MIN: CPT | Performed by: FAMILY MEDICINE

## 2019-07-06 RX ORDER — CEPHALEXIN 500 MG/1
500 CAPSULE ORAL 2 TIMES DAILY
Qty: 20 CAPSULE | Refills: 0 | Status: SHIPPED | OUTPATIENT
Start: 2019-07-06 | End: 2019-12-13

## 2019-07-06 NOTE — PROGRESS NOTES
SUBJECTIVE:  Patient presents with bug bite on rt upper thigh 2-3 days ago.  No tick seen.    Past Medical History:   Diagnosis Date     Breast cancer (H) 2004    lumpectomy and 33 lymph nodes removed.     Shingles      Current Outpatient Medications   Medication Sig Dispense Refill     cephALEXin (KEFLEX) 500 MG capsule Take 1 capsule (500 mg) by mouth 2 times daily for 10 days 20 capsule 0     Cholecalciferol (VITAMIN D) 2000 UNITS tablet Take 2,000 Units by mouth daily       gabapentin (NEURONTIN) 300 MG capsule TAKE 1 CAPSULE BY MOUTH TWICE DAILY NEEDS  LABS  FOR  FURTHER  REFILLS 180 capsule 0     lidocaine (LIDODERM) 5 % patch Apply 1-3 patches to area of pain. On for 12 hours off for 12 hours. Okay to cut for better fit 90 patch 0     lisinopril (PRINIVIL/ZESTRIL) 10 MG tablet Take 1 tablet (10 mg) by mouth daily 90 tablet 3     Multiple Vitamins-Minerals (MULTIVITAMIN OR)        acetaminophen (TYLENOL) 325 MG tablet Take 325-650 mg by mouth every 6 hours as needed       IBUPROFEN PO Take 400 mg by mouth every 6 hours as needed for moderate pain       omega 3 1000 MG CAPS Take 1 g by mouth daily 90 capsule      History   Smoking Status     Never Smoker   Smokeless Tobacco     Never Used         OBJECITVE;  /71   Pulse 64   Temp 96.7  F (35.9  C) (Tympanic)   SpO2 97%   2 cm pale [place] with red center.        ASSESSMENT:  Bug bite. Not tick    PLAN: Cephalexin printed rx.  Picture of tick rash.  Fill rx only if appropriate rash.  Follow up with primary care provider if no improvement.                                                                                SUBJECTIVE  Coby Damon is a 79 year old female who presents with a chief complaint of an animal bite on the .  .leoncio was bitten by jonas Ambrocio of bite: .  Severity: .     Associated symptoms:         Past Medical History:   Diagnosis Date     Breast cancer (H) 2004    lumpectomy and 33 lymph nodes removed.     Shingles         Current Outpatient Medications:      cephALEXin (KEFLEX) 500 MG capsule, Take 1 capsule (500 mg) by mouth 2 times daily for 10 days, Disp: 20 capsule, Rfl: 0     Cholecalciferol (VITAMIN D) 2000 UNITS tablet, Take 2,000 Units by mouth daily, Disp: , Rfl:      gabapentin (NEURONTIN) 300 MG capsule, TAKE 1 CAPSULE BY MOUTH TWICE DAILY NEEDS  LABS  FOR  FURTHER  REFILLS, Disp: 180 capsule, Rfl: 0     lidocaine (LIDODERM) 5 % patch, Apply 1-3 patches to area of pain. On for 12 hours off for 12 hours. Okay to cut for better fit, Disp: 90 patch, Rfl: 0     lisinopril (PRINIVIL/ZESTRIL) 10 MG tablet, Take 1 tablet (10 mg) by mouth daily, Disp: 90 tablet, Rfl: 3     Multiple Vitamins-Minerals (MULTIVITAMIN OR), , Disp: , Rfl:      acetaminophen (TYLENOL) 325 MG tablet, Take 325-650 mg by mouth every 6 hours as needed, Disp: , Rfl:      IBUPROFEN PO, Take 400 mg by mouth every 6 hours as needed for moderate pain, Disp: , Rfl:      omega 3 1000 MG CAPS, Take 1 g by mouth daily, Disp: 90 capsule, Rfl:   History   Smoking Status     Never Smoker   Smokeless Tobacco     Never Used         ROS:      OBJECTIVE:  /71   Pulse 64   Temp 96.7  F (35.9  C) (Tympanic)   SpO2 97%   GENERAL: healthy, alert no acute distress  SKIN:  of       ASSESSMENT:      PLAN:  No orders of the defined types were placed in this encounter.    Follow up with primary care provider in no improvement.

## 2019-07-15 ENCOUNTER — OFFICE VISIT (OUTPATIENT)
Dept: PALLIATIVE MEDICINE | Facility: CLINIC | Age: 79
End: 2019-07-15
Payer: COMMERCIAL

## 2019-07-15 VITALS
TEMPERATURE: 98 F | HEIGHT: 61 IN | SYSTOLIC BLOOD PRESSURE: 102 MMHG | WEIGHT: 114.5 LBS | BODY MASS INDEX: 21.62 KG/M2 | DIASTOLIC BLOOD PRESSURE: 56 MMHG

## 2019-07-15 DIAGNOSIS — M79.18 MYOFASCIAL PAIN: Primary | ICD-10-CM

## 2019-07-15 PROCEDURE — 20552 NJX 1/MLT TRIGGER POINT 1/2: CPT | Performed by: PHYSICIAN ASSISTANT

## 2019-07-15 ASSESSMENT — MIFFLIN-ST. JEOR: SCORE: 936.75

## 2019-07-15 ASSESSMENT — PAIN SCALES - GENERAL: PAINLEVEL: NO PAIN (1)

## 2019-07-15 NOTE — PATIENT INSTRUCTIONS
[facility]   Post Procedure Instructions    Today you had:  trigger point injections       Medications used:  lidocaine   bupivicaine           Go to the emergency room if you develop any shortness of breath    Monitor the injection sites for signs and symptoms of infection-fever, chills, redness, swelling, warmth, or drainage to areas.    You may have soreness at injection sites for up to 24 hours.    You may apply ice to the painful areas to help minimize the discomfort of the needle pokes.    Do not apply heat to sites for at least 12 hours.    You may use anti-inflammatory medications or Tylenol for pain control if necessary    Pain Clinic phone number during work hours Monday-Friday:  [phone]    After hours provider line: [phone]

## 2019-07-15 NOTE — PROGRESS NOTES
"/56   Temp 98  F (36.7  C) (Temporal)   Ht 1.549 m (5' 1\")   Wt 51.9 kg (114 lb 8 oz)   BMI 21.63 kg/m    Body mass index is 21.63 kg/m .  5' 1\"  114 lbs 8 oz      Ximena Macias MA on 7/15/2019 at 9:32 AM    "

## 2019-07-15 NOTE — PROGRESS NOTES
Pre Procedure Diagnosis: myofascial pain  Post procedure Diagnosis: Same  Procedure performed: trigger point injections  Anesthesia: none  Complications: none  Operators: Lizeth Munoz PA-C     Indications:   Coby Damon is a 79 year old female with a history of low back pain.  Exam shows myofascial pain of the muscle groups listed below and they have tried conservative treatment including physical therapy and medications.    Options/alternatives, benefits and risks were discussed with the patient including bleeding, infection, tissue trauma and pnuemothorax.  Questions were answered to her satisfaction and she agrees to proceed. Voluntary informed consent was obtained and signed.     Vitals were reviewed: Yes  Allergies were reviewed:  Yes   Medications were reviewed:  Yes   Pre-procedure pain score: 1/10    Procedure:  After getting informed consent, a Pause for the Cause was performed.    Trigger points were identified by patient, and marked when appropriate.  The area was prepped with Chloroprep.    Using clean technique, injections were completed using a 25G, 1.5 inch needle.  After negative aspiration, injection was completed.  A total of 3 locations were injected.  When possible, tissue was retracted from the chest wall to avoid lung injury.    Muscle groups injected:  Right lumbar paraspinals    Injection solution contained:  3ml of 1% lidocaine and 3ml of 0.5% bupivacaine.    Hemostasis was achieved, the area was cleaned, and bandaids were placed when appropriate.  The patient tolerated the procedure well.  Breath sounds were normal.      Post-procedure pain score: 0/10  Follow-up includes:   -repeat prn      Lizeth Munoz, PAC  [facility]

## 2019-09-30 ENCOUNTER — OFFICE VISIT (OUTPATIENT)
Dept: FAMILY MEDICINE | Facility: CLINIC | Age: 79
End: 2019-09-30
Payer: COMMERCIAL

## 2019-09-30 VITALS
HEIGHT: 61 IN | BODY MASS INDEX: 21.35 KG/M2 | HEART RATE: 64 BPM | TEMPERATURE: 97.2 F | WEIGHT: 113.1 LBS | OXYGEN SATURATION: 100 % | RESPIRATION RATE: 16 BRPM | SYSTOLIC BLOOD PRESSURE: 120 MMHG | DIASTOLIC BLOOD PRESSURE: 64 MMHG

## 2019-09-30 DIAGNOSIS — M41.9 SCOLIOSIS OF THORACOLUMBAR SPINE, UNSPECIFIED SCOLIOSIS TYPE: ICD-10-CM

## 2019-09-30 DIAGNOSIS — M54.5 LOW BACK PAIN, UNSPECIFIED BACK PAIN LATERALITY, UNSPECIFIED CHRONICITY, WITH SCIATICA PRESENCE UNSPECIFIED: Primary | ICD-10-CM

## 2019-09-30 DIAGNOSIS — G89.29 CHRONIC RIGHT SHOULDER PAIN: ICD-10-CM

## 2019-09-30 DIAGNOSIS — M25.511 CHRONIC RIGHT SHOULDER PAIN: ICD-10-CM

## 2019-09-30 DIAGNOSIS — Z23 NEED FOR PROPHYLACTIC VACCINATION AND INOCULATION AGAINST INFLUENZA: ICD-10-CM

## 2019-09-30 PROCEDURE — 99214 OFFICE O/P EST MOD 30 MIN: CPT | Mod: 25 | Performed by: FAMILY MEDICINE

## 2019-09-30 PROCEDURE — G0008 ADMIN INFLUENZA VIRUS VAC: HCPCS | Performed by: FAMILY MEDICINE

## 2019-09-30 PROCEDURE — 90662 IIV NO PRSV INCREASED AG IM: CPT | Performed by: FAMILY MEDICINE

## 2019-09-30 RX ORDER — GABAPENTIN 300 MG/1
CAPSULE ORAL
Qty: 180 CAPSULE | Refills: 1 | Status: SHIPPED | OUTPATIENT
Start: 2019-09-30 | End: 2020-10-28

## 2019-09-30 ASSESSMENT — MIFFLIN-ST. JEOR: SCORE: 930.4

## 2019-09-30 NOTE — PROGRESS NOTES
Subjective     Coby Damon is a 79 year old female who presents to clinic today for the following health issues:    HPI     Patient will be seeing Dr. Aguilar October 11th for an injection. Patient would like to have a refill of her gabapentin today.          Chronic/Recurring Back Pain Follow Up      Where is your back pain located? (Select all that apply) low back right    How would you describe your back pain?  burning and dull ache    Where does your back pain spread? the right  thigh    Since your last clinic visit for back pain, how has your pain changed? gradually worsening    Does your back pain interfere with your job? Not applicable    Since your last visit, have you tried any new treatment? No        How many servings of fruits and vegetables do you eat daily?  2-3    On average, how many sweetened beverages do you drink each day (soda, juice, sweet tea, etc)?   0    How many days per week do you miss taking your medication? 0    SUBJECTIVE:  Coby  is a 79 year old female who presents for: Follow-up of her back pain.  She suffers from extreme scoliosis.  She gets by with just a couple of ibuprofen a day and some gabapentin.  Has constant pain and she does have some radiation to her right thigh she is seeing neurosurgery in a week and a half.  She does see chronic pain clinic.  Has been complaining of her right shoulder again to ask for this in the past and she has had an MR done.  Now has a little bit of tingling into her little finger ring finger and middle finger.    Past Medical History:   Diagnosis Date     Breast cancer (H) 2004    lumpectomy and 33 lymph nodes removed.     Shingles      Past Surgical History:   Procedure Laterality Date     COLONOSCOPY N/A 12/12/2014    Procedure: COLONOSCOPY;  Surgeon: Bandar Guidry MD;  Location: [facility]     HYSTERECTOMY, PAP NO LONGER INDICATED       INJECT EPIDURAL CERVICAL N/A 9/23/2015    Procedure: INJECT EPIDURAL CERVICAL;  Surgeon: Sawyer Webster  "MD RAMIN;  Location: [facility]     PHACOEMULSIFICATION WITH STANDARD INTRAOCULAR LENS IMPLANT Left 11/15/2018    Procedure: PHACOEMULSIFICATION WITH STANDARD INTRAOCULAR LENS IMPLANT LEFT EYE;  Surgeon: Rian Ford MD;  Location: [facility]     PHACOEMULSIFICATION WITH STANDARD INTRAOCULAR LENS IMPLANT Right 11/29/2018    Procedure: PHACOEMULSIFICATION WITH STANDARD INTRAOCULAR LENS IMPLANT RIGHT EYE;  Surgeon: Rian Ford MD;  Location: [facility]     Social History     Tobacco Use     Smoking status: Never Smoker     Smokeless tobacco: Never Used   Substance Use Topics     Alcohol use: Yes     Comment:  1 glass of wine with dinner      Current Outpatient Medications   Medication Sig Dispense Refill     Cholecalciferol (VITAMIN D) 2000 UNITS tablet Take 2,000 Units by mouth daily       gabapentin (NEURONTIN) 300 MG capsule TAKE 1 CAPSULE BY MOUTH TWICE DAILY. 180 capsule 1     IBUPROFEN PO Take 400 mg by mouth every 6 hours as needed for moderate pain       lidocaine (LIDODERM) 5 % patch Apply 1-3 patches to area of pain. On for 12 hours off for 12 hours. Okay to cut for better fit 90 patch 0     lisinopril (PRINIVIL/ZESTRIL) 10 MG tablet Take 1 tablet (10 mg) by mouth daily 90 tablet 3     Multiple Vitamins-Minerals (MULTIVITAMIN OR)        acetaminophen (TYLENOL) 325 MG tablet Take 325-650 mg by mouth every 6 hours as needed       omega 3 1000 MG CAPS Take 1 g by mouth daily 90 capsule        REVIEW OF SYSTEMS:   5 point ROS negative except as noted above in HPI, including Gen., Resp, CV, GI &  system review.     OBJECTIVE:  Vitals: /64 (BP Location: Right arm, Patient Position: Sitting, Cuff Size: Adult Regular)   Pulse 64   Temp 97.2  F (36.2  C) (Temporal)   Resp 16   Ht 1.549 m (5' 1\")   Wt 51.3 kg (113 lb 1.6 oz)   SpO2 100%   BMI 21.37 kg/m    BMI= Body mass index is 21.37 kg/m .  She is alert and appears comfortable.  Right shoulder with full range of motion.   strength in the hand " is normal.  Finger strength is normal.  Negative Tinel sign negative Phalen sign at the wrist.  Her spine shows obvious scoliosis.  Some tenderness in the right lower lumbar region.  Gait is regular.    ASSESSMENT:  #1 back pain with some radiation into the hip #2 severe scoliosis #3 right shoulder pain    PLAN:  She is seeing neurosurgery for consideration for an injection in her spine.  We may need to look into her cervical spine little bit more if she is getting a little bit of numbness and tingling in her fingers.  She takes minimal gabapentin and this could be raised up as well.        Bandar Will MD  [facility]

## 2019-10-11 ENCOUNTER — OFFICE VISIT (OUTPATIENT)
Dept: NEUROSURGERY | Facility: CLINIC | Age: 79
End: 2019-10-11
Payer: COMMERCIAL

## 2019-10-11 VITALS
SYSTOLIC BLOOD PRESSURE: 110 MMHG | BODY MASS INDEX: 21.77 KG/M2 | HEIGHT: 61 IN | OXYGEN SATURATION: 94 % | WEIGHT: 115.3 LBS | DIASTOLIC BLOOD PRESSURE: 62 MMHG | HEART RATE: 62 BPM | TEMPERATURE: 97 F

## 2019-10-11 DIAGNOSIS — M79.18 MYOFASCIAL PAIN: ICD-10-CM

## 2019-10-11 DIAGNOSIS — M41.25 OTHER IDIOPATHIC SCOLIOSIS, THORACOLUMBAR REGION: ICD-10-CM

## 2019-10-11 DIAGNOSIS — M54.2 CERVICALGIA: Primary | ICD-10-CM

## 2019-10-11 PROCEDURE — 99214 OFFICE O/P EST MOD 30 MIN: CPT | Performed by: PHYSICIAN ASSISTANT

## 2019-10-11 ASSESSMENT — MIFFLIN-ST. JEOR: SCORE: 940.38

## 2019-10-11 NOTE — PROGRESS NOTES
[facility]  Neurosurgery followup:    HPI: 71-year-old female, in today for follow-up in regards to her low back and neck pain.  She was previously seen for her low back earlier this spring.  She does have severe scoliotic deformity, but is adamant that she is not going to have surgery.  I did send her to pain management to try some trigger point shots, with moderate improvement of symptoms.  She would like to try these again.  Regarding her cervical spine, she is describing a lot of numbness and tingling in her right arm, as well as neck pain.  She received an epidural injection in 2015, with very good symptomatic improvement.  She now states that the same symptoms have begun to return.  Exam:  Constitutional:  Alert, well nourished, NAD.  HEENT: Normocephalic, atraumatic.   Pulm:  Without shortness of breath   CV:  No pitting edema of BLE.      Neurological:  Awake  Alert  Oriented x 3  Motor exam:        IP Q DF PF EHL  R   5  5   5   5    5  L   5  5   5   5    5     Reflexes are 2+ in the patellar and Achilles. There is no clonus. Downgoing Babinski.      Able to spontaneously move L/E bilaterally  Sensation intact throughout all L/E dermatomes       A/P:  I did have a discussion with the patient regarding her symptoms.  Regarding her cervical spine, we will go ahead and repeat the injection that she had 4 years ago, as she did have very good symptomatic improvement and her return of symptoms is very similar to what she had then.  Regarding her low back, she had some improvement with trigger point shots earlier this spring, so we can also repeat these with pain management.  She understands that with her degree of scoliosis, she may get to a point where she is not able to achieve very good pain control with conservative measures.  I discussed with her that if she does wish to pursue a surgical option, I would refer her to the [place].        Joaquín Aguilar PA-C  [facility]  [place]  [address]  [address]  [place] [address]    Tel [phone]  Pager [phone]

## 2019-10-11 NOTE — NURSING NOTE
"Coby Damon is a 79 year old female who presents for:  Chief Complaint   Patient presents with     RECHECK     low back pain        Initial Vitals:  /62   Pulse 62   Temp 97  F (36.1  C) (Temporal)   Ht 5' 1\" (1.549 m)   Wt 115 lb 4.8 oz (52.3 kg)   SpO2 94%   BMI 21.79 kg/m   Estimated body mass index is 21.79 kg/m  as calculated from the following:    Height as of this encounter: 5' 1\" (1.549 m).    Weight as of this encounter: 115 lb 4.8 oz (52.3 kg).. Body surface area is 1.5 meters squared. BP completed using cuff size: regular  Data Unavailable        Nursing Comments:         Patt Giraldo    "

## 2019-10-11 NOTE — LETTER
10/11/2019         RE: Coby Damon  [address] [facility] [address]        Dear Colleague,    Thank you for referring your patient, Coby Damon, to the [facility]. Please see a copy of my visit note below.    [facility]  Neurosurgery followup:    HPI: 71-year-old female, in today for follow-up in regards to her low back and neck pain.  She was previously seen for her low back earlier this spring.  She does have severe scoliotic deformity, but is adamant that she is not going to have surgery.  I did send her to pain management to try some trigger point shots, with moderate improvement of symptoms.  She would like to try these again.  Regarding her cervical spine, she is describing a lot of numbness and tingling in her right arm, as well as neck pain.  She received an epidural injection in 2015, with very good symptomatic improvement.  She now states that the same symptoms have begun to return.  Exam:  Constitutional:  Alert, well nourished, NAD.  HEENT: Normocephalic, atraumatic.   Pulm:  Without shortness of breath   CV:  No pitting edema of BLE.      Neurological:  Awake  Alert  Oriented x 3  Motor exam:        IP Q DF PF EHL  R   5  5   5   5    5  L   5  5   5   5    5     Reflexes are 2+ in the patellar and Achilles. There is no clonus. Downgoing Babinski.      Able to spontaneously move L/E bilaterally  Sensation intact throughout all L/E dermatomes       A/P:  I did have a discussion with the patient regarding her symptoms.  Regarding her cervical spine, we will go ahead and repeat the injection that she had 4 years ago, as she did have very good symptomatic improvement and her return of symptoms is very similar to what she had then.  Regarding her low back, she had some improvement with trigger point shots earlier this spring, so we can also repeat these with pain management.  She understands that with her degree of scoliosis, she may get to a point where she  is not able to achieve very good pain control with conservative measures.  I discussed with her that if she does wish to pursue a surgical option, I would refer her to the [place].        Joaquín Aguilar PA-C  [facility]  [address] [address]    Tel [phone]  Pager [phone]      Again, thank you for allowing me to participate in the care of your patient.        Sincerely,        Joaquín Aguilar PA-C

## 2019-10-14 ENCOUNTER — TELEPHONE (OUTPATIENT)
Dept: SURGERY | Facility: CLINIC | Age: 79
End: 2019-10-14

## 2019-10-15 NOTE — TELEPHONE ENCOUNTER
Contacted patient to schedule KATHY  Date: 10/24/19  Time: 300PM  Dr. Webster    Instructed pt to have H&P and  for procedure.

## 2019-10-16 ENCOUNTER — OFFICE VISIT (OUTPATIENT)
Dept: PALLIATIVE MEDICINE | Facility: CLINIC | Age: 79
End: 2019-10-16
Payer: COMMERCIAL

## 2019-10-16 ENCOUNTER — TELEPHONE (OUTPATIENT)
Dept: SURGERY | Facility: CLINIC | Age: 79
End: 2019-10-16

## 2019-10-16 VITALS
BODY MASS INDEX: 21.99 KG/M2 | SYSTOLIC BLOOD PRESSURE: 116 MMHG | DIASTOLIC BLOOD PRESSURE: 64 MMHG | WEIGHT: 116.5 LBS | HEIGHT: 61 IN | TEMPERATURE: 98.2 F

## 2019-10-16 DIAGNOSIS — M79.18 MYOFASCIAL PAIN: ICD-10-CM

## 2019-10-16 DIAGNOSIS — G89.29 CHRONIC RIGHT SI JOINT PAIN: Primary | ICD-10-CM

## 2019-10-16 DIAGNOSIS — M54.50 CHRONIC RIGHT-SIDED LOW BACK PAIN WITHOUT SCIATICA: ICD-10-CM

## 2019-10-16 DIAGNOSIS — M53.3 CHRONIC RIGHT SI JOINT PAIN: Primary | ICD-10-CM

## 2019-10-16 DIAGNOSIS — G89.29 CHRONIC RIGHT-SIDED LOW BACK PAIN WITHOUT SCIATICA: ICD-10-CM

## 2019-10-16 PROCEDURE — 99213 OFFICE O/P EST LOW 20 MIN: CPT | Performed by: PHYSICIAN ASSISTANT

## 2019-10-16 ASSESSMENT — MIFFLIN-ST. JEOR: SCORE: 945.82

## 2019-10-16 ASSESSMENT — PAIN SCALES - GENERAL: PAINLEVEL: SEVERE PAIN (6)

## 2019-10-16 NOTE — PATIENT INSTRUCTIONS
After Visit Instructions:     Thank you for coming to [facility] for your care. It is my goal to partner with you to help you reach your optimal state of health.     I am recommending multidisciplinary care at this time.  The focus of care will be to continue gradual rehabilitation and pain management with medication adjustments as needed.    Continue daily self-care, identifying contributing factors, and monitoring variations in pain level. Continue to integrate self-care into your life.        Schedule follow-up with Lizeth uMnoz PA-C will depend on your pain relief after the injection. You will need to make this appointment.   Procedures recommended: let's start with a right SI joint injection. If this helps the buttock pain but not the right low back, we could try the trigger point injections again at that spot.  To call and schedule your procedure, you can call: [phone], then hit option 2        Lizeth Munoz PA-C  [facility]  [facility]    Contact information: [facility]  Clinic Number:  [phone]     Call with any questions about your care and for scheduling assistance.     Calls are returned Monday through Friday between 8 AM and 4:30 PM. We usually get back to you within 2 business days depending on the issue/request.    If we are prescribing your medications:    For opioid medication refills, call the clinic or send a Telekenex message 7 days in advance.  Please include:    Name of requested medication    Name of the pharmacy.    For non-opioid medications, call your pharmacy directly to request a refill. Please allow 3-4 days to be processed.     Per [place] State Law:    All controlled substance prescriptions must be filled within 30 days of being written.      For those controlled substances allowing refills, pickup must occur within 30 days of last fill.      We believe regular attendance is key to your success in our  program!      Any time you are unable to keep your appointment we ask that you call us at least 24 hours in advance to cancel.This will allow us to offer the appointment time to another patient.   Multiple missed appointments may lead to dismissal from the clinic.

## 2019-10-16 NOTE — PROGRESS NOTES
"[facility]    CHIEF COMPLAINT:   Pain  -Degenerative Scoliosis    INTERVAL HISTORY:  Last seen on 3/20/19.        Recommendations/plan at the last visit included:  1. Physical Therapy:  Continue current plan  2. Diagnostic Studies:  Not at this time  3. Medication Management:   1. Lidoderm patches: apply 1-3 patches to area of pain. On for 12 hours off for 12 hours.   2. We could consider an increase in her Gabapentin or start a muscle relaxant  4. Potential procedures: Will plan for trigger point injections  5. Recommendations to PCP: see above     Follow up: will call for trigger point injections     Since her last visit, Cobytony Damon reports:  - the last trigger point injections were not effective for her.  She reports that she gets a lot of burning sensation in her right buttock and down her thigh.  She states that the pain starts in the low back upper buttock area.  She does also get some pain in the right lower back.  This is the area that we have previously done trigger point injections on.      Pain Information:   Pain quality: Burning, Nagging and Numb    Pain rating: intensity ranges from 2/10 to 8/10, and averages 6/10 on a 0-10 scale.   Pain today 6/10      CURRENT RELEVANT PAIN MEDICATIONS:   Gabapentin 300mg at night  Ibuprofen 200mg-6-8/day lately (before was only taking 2 at night)       Patient is using the medication as prescribed:  YES  Is your medication helpful? YES   Medication side effects? no side effect    Previous Medications: (H--helped; HI--Helped initially; SWH-- somewhat helpful, NH--No help; W--worse; SE--side effects)   Opiates: none              NSAIDS: Ibuprofen H              Muscle Relaxants: was on one years ago \"hated it\" \"felt goofy\"              Anti-migraine mediations: none              Anti-depressants: none              Sleep aids: none              Anxiolytics: was on Valium years ago \"felt goofy\"              Neuropathics:  Gabapentin " "NH                        Topicals: none              Other medications not covered above: Tylenol NH       Past Pain Treatments:  Pain Clinic:   No   PT: Yes, has one more session next week (has been helpful)  Psychologist: No  Relaxation techniques/biofeedback: No  Chiropractor: Yes, was not helpful  Acupuncture: No  Pharmacotherapy:           Opioids: No             Non-opioids:    Yes   TENs Unit: No   Injections: Yes, in [place]. Last injection was in 2012, epidurals.   Self-care:   Yes, ice packs during the day, heating pad at night, hot showers  Surgeries related to pain: No     [place] Board of Pharmacy Data Base Reviewed:    YES; As expected, no concern for misuse/abuse of controlled medications based on this report.      Medications:  Current Outpatient Medications   Medication Sig Dispense Refill     acetaminophen (TYLENOL) 325 MG tablet Take 325-650 mg by mouth every 6 hours as needed       Cholecalciferol (VITAMIN D) 2000 UNITS tablet Take 2,000 Units by mouth daily       gabapentin (NEURONTIN) 300 MG capsule TAKE 1 CAPSULE BY MOUTH TWICE DAILY. 180 capsule 1     IBUPROFEN PO Take 400 mg by mouth every 6 hours as needed for moderate pain       lidocaine (LIDODERM) 5 % patch Apply 1-3 patches to area of pain. On for 12 hours off for 12 hours. Okay to cut for better fit 90 patch 0     lisinopril (PRINIVIL/ZESTRIL) 10 MG tablet Take 1 tablet (10 mg) by mouth daily 90 tablet 3     Multiple Vitamins-Minerals (MULTIVITAMIN OR)        omega 3 1000 MG CAPS Take 1 g by mouth daily 90 capsule        Review of Systems: A 10-point review of systems was negative, with the exception of chronic pain issues, vision changes and itching.      Social History: Reviewed; unchanged from previous consultation.      Family history: Reviewed; unchanged from previous consultation.     PHYSICAL EXAM:     Vitals: /64   Temp 98.2  F (36.8  C) (Temporal)   Ht 1.549 m (5' 1\")   Wt 52.8 kg (116 lb 8 oz)   BMI 22.01 kg/m  "       Constitutional: healthy, alert and no distress  HEENT: Head atraumatic, normocephalic. Eyes without conjunctival injection or jaundice. Neck supple. No obvious neck masses.  Skin: No rash, lesions, or petechiae of exposed skin.   Psychiatric/mental status: Alert, without lethargy or stupor. Appropriate affect. Mood normal.     Musculoskeletal exam:  Patient's pain starts at the right SI joint.  She also has pain on the right side of her lumbar paraspinals.      DIAGNOSTIC TESTS:  Imaging Studies:   No new imaging to review    Assessment:  Coby Damon is a 79 year old female who presents today for follow up regarding her:    1. Chronic right SI joint pain  2. Right-sided low back pain  3. Myofascial pain    I discussed with the patient that I am not sure repeating the trigger point injections would be beneficial.  The pain that she describes sounds more like it is her sacroiliac joint.  Certainly with the amount of scoliosis that she has this would not be all that surprising as this is causing some of her pain issues.  We talked about doing a right SI joint injection.  She would like to proceed with this.  Certainly if this helps the pain that she gets in her buttocks and leg but she has persistent pain in the lumbar paraspinals we can try the trigger point injections again however at this point I think trying an SI joint injection is going to be more beneficial for her.    Plan:    Diagnosis reviewed, treatment option addressed, and risk/benifits discussed.  Self-care instructions given.  I am recommending a multidisciplinary treatment plan to help this patient better manage pain.      1. Physical Therapy:  NO   2. Clinical Health Psychologist:  NO    3. Diagnostic Studies:  None at this time  4. Medication Management:  No changes   5. Further procedures recommended: right SI joint injection  6. Recommendations to PCP. See above      Follow up with this provider: Will depend on how the patient does  after the SI joint injection.  If her pain is doing well she can follow-up with me as needed.  If this does not help her pain she should follow-up so we can come up with a new plan.    Total time spent face to face was 10 minutes and more than 50% of face to face time was spent in counseling and/or coordination of care regarding the diagnosis and recommendations above.      Lizeth Munoz PA-C   [facility]

## 2019-10-16 NOTE — TELEPHONE ENCOUNTER
Contacted patient to schedule SI injection  Date:11/8/19  Time:8:00am  Dr. Webster    Instructed pt to have H&P and  for procedure.

## 2019-10-22 ENCOUNTER — OFFICE VISIT (OUTPATIENT)
Dept: FAMILY MEDICINE | Facility: CLINIC | Age: 79
End: 2019-10-22
Payer: COMMERCIAL

## 2019-10-22 VITALS
RESPIRATION RATE: 18 BRPM | TEMPERATURE: 97.1 F | OXYGEN SATURATION: 100 % | BODY MASS INDEX: 21.69 KG/M2 | DIASTOLIC BLOOD PRESSURE: 62 MMHG | HEART RATE: 82 BPM | WEIGHT: 114.8 LBS | SYSTOLIC BLOOD PRESSURE: 102 MMHG

## 2019-10-22 DIAGNOSIS — Z01.818 PREOP GENERAL PHYSICAL EXAM: Primary | ICD-10-CM

## 2019-10-22 PROCEDURE — 99213 OFFICE O/P EST LOW 20 MIN: CPT | Performed by: NURSE PRACTITIONER

## 2019-10-22 ASSESSMENT — PAIN SCALES - GENERAL: PAINLEVEL: MODERATE PAIN (5)

## 2019-10-22 NOTE — PROGRESS NOTES
[address] [address]  [phone]  Dept: [phone]    PRE-OP EVALUATION:  Today's date: 10/22/2019    Coby Damon (: 1940) presents for pre-operative evaluation assessment as requested by Dr. Webster.  She requires evaluation and anesthesia risk assessment prior to undergoing surgery/procedure for treatment of Sacroiliac Therapeutic injection right and injection of cervical spine .    Proposed Surgery/ Procedure: Injections of cervical and sacroiliac  Date of Surgery/ Procedure: 10/24/19, 19  Time of Surgery/ Procedure: 2 pm, 7 am   Hospital/Surgical Facility[facility]  Primary Physician: Bandar Will  Type of Anesthesia Anticipated: Epidural    Patient has a Health Care Directive or Living Will:  YES     1. NO - Do you have a history of heart attack, stroke, stent, bypass or surgery on an artery in the head, neck, heart or legs?  2. NO - Do you ever have any pain or discomfort in your chest?  3. NO - Do you have a history of  Heart Failure?  4. NO - Are you troubled by shortness of breath when: walking on the level, up a slight hill or at night?  5. NO - Do you currently have a cold, bronchitis or other respiratory infection?  6. NO - Do you have a cough, shortness of breath or wheezing?  7. NO - Do you sometimes get pains in the calves of your legs when you walk?  8. NO - Do you or anyone in your family have previous history of blood clots?  9. NO - Do you or does anyone in your family have a serious bleeding problem such as prolonged bleeding following surgeries or cuts?  10. NO - Have you ever had problems with anemia or been told to take iron pills?  11. NO - Have you had any abnormal blood loss such as black, tarry or bloody stools, or abnormal vaginal bleeding?  12. NO - Have you ever had a blood transfusion?  13. NO - Have you or any of your relatives ever had problems with anesthesia?  14. NO - Do you have sleep apnea,  excessive snoring or daytime drowsiness?  15. NO - Do you have any prosthetic heart valves?  16. NO - Do you have prosthetic joints?  17. NO - Is there any chance that you may be pregnant?      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 Multiple degenerative disc and facet disease. Has tried PT and Trigger injections and failed.     Hypertension and Hyperlipidemia well controlled. Takes the gabapentin at bedtime and 400 mg Ibuprofen at bed time for bed. At times will take 400-600 mg Ibuprofen prior to activity. She will hold the ibuprofen now until her procedures and switch to tylenol.        MEDICAL HISTORY:     Patient Active Problem List    Diagnosis Date Noted     Scoliosis of thoracolumbar spine, unspecified scoliosis type 09/30/2019     Priority: Medium     Advance Care Planning 09/22/2017     Priority: Medium     Low back pain, unspecified back pain laterality, unspecified chronicity, with sciatica presence unspecified 12/14/2016     Priority: Medium     Cervicalgia 08/28/2015     Priority: Medium     Senile osteoporosis 12/10/2014     Priority: Medium     Hyperlipidemia LDL goal <130 12/10/2014     Priority: Medium     CARDIOVASCULAR SCREENING; LDL GOAL LESS THAN 130 09/18/2013     Priority: Medium     Hypertension goal BP (blood pressure) < 140/90 09/18/2013     Priority: Medium      Past Medical History:   Diagnosis Date     Breast cancer (H) 2004    lumpectomy and 33 lymph nodes removed.     Shingles      Past Surgical History:   Procedure Laterality Date     COLONOSCOPY N/A 12/12/2014    Procedure: COLONOSCOPY;  Surgeon: Bandar Guidry MD;  Location:  GI     HYSTERECTOMY, PAP NO LONGER INDICATED       INJECT EPIDURAL CERVICAL N/A 9/23/2015    Procedure: INJECT EPIDURAL CERVICAL;  Surgeon: Sawyer Webster MD;  Location: [facility] OR     PHACOEMULSIFICATION WITH STANDARD INTRAOCULAR LENS IMPLANT Left  11/15/2018    Procedure: PHACOEMULSIFICATION WITH STANDARD INTRAOCULAR LENS IMPLANT LEFT EYE;  Surgeon: Rian Ford MD;  Location: [facility] OR     PHACOEMULSIFICATION WITH STANDARD INTRAOCULAR LENS IMPLANT Right 11/29/2018    Procedure: PHACOEMULSIFICATION WITH STANDARD INTRAOCULAR LENS IMPLANT RIGHT EYE;  Surgeon: Rian Ford MD;  Location: [facility] OR     Current Outpatient Medications   Medication Sig Dispense Refill     acetaminophen (TYLENOL) 325 MG tablet Take 325-650 mg by mouth every 6 hours as needed       Cholecalciferol (VITAMIN D) 2000 UNITS tablet Take 2,000 Units by mouth daily       gabapentin (NEURONTIN) 300 MG capsule TAKE 1 CAPSULE BY MOUTH TWICE DAILY. 180 capsule 1     IBUPROFEN PO Take 400 mg by mouth every 6 hours as needed for moderate pain       lidocaine (LIDODERM) 5 % patch Apply 1-3 patches to area of pain. On for 12 hours off for 12 hours. Okay to cut for better fit 90 patch 0     lisinopril (PRINIVIL/ZESTRIL) 10 MG tablet Take 1 tablet (10 mg) by mouth daily 90 tablet 3     Multiple Vitamins-Minerals (MULTIVITAMIN OR)        omega 3 1000 MG CAPS Take 1 g by mouth daily 90 capsule      OTC products: None, except as noted above    No Known Allergies   Latex Allergy: NO    Social History     Tobacco Use     Smoking status: Never Smoker     Smokeless tobacco: Never Used   Substance Use Topics     Alcohol use: Yes     Comment:  1 glass of wine with dinner      History   Drug Use No       REVIEW OF SYSTEMS:   CONSTITUTIONAL: NEGATIVE for fever, chills, change in weight  ENT/MOUTH: NEGATIVE for ear, mouth and throat problems  RESP: NEGATIVE for significant cough or SOB  CV: NEGATIVE for chest pain, palpitations or peripheral edema    EXAM:   /62   Pulse 82   Temp 97.1  F (36.2  C) (Temporal)   Resp 18   Wt 52.1 kg (114 lb 12.8 oz)   SpO2 100%   BMI 21.69 kg/m    GENERAL APPEARANCE: healthy, alert and no distress  HENT: ear canals and TM's normal and nose and mouth without  ulcers or lesions  RESP: lungs clear to auscultation - no rales, rhonchi or wheezes  CV: regular rate and rhythm, normal S1 S2, no S3 or S4 and no murmur, click or rub   ABDOMEN: soft, nontender, no HSM or masses and bowel sounds normal  NEURO: Normal strength and tone, sensory exam grossly normal, mentation intact and speech normal    DIAGNOSTICS:   No labs or EKG required for low risk surgery (cataract, skin procedure, breast biopsy, etc)    Recent Labs   Lab Test 11/09/18  1004 09/22/17  0916  06/04/14  1032  03/06/13   HGB  --   --   --  13.6  --   --    PLT  --   --   --  310  --   --     141   < > 144   < > 139   POTASSIUM 4.1 4.4   < > 4.6   < > 4.8   CR 0.77 0.75   < > 0.88   < > 0.84   A1C  --   --   --   --   --  5.5    < > = values in this interval not displayed.        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 Multiple degenerative disc and facet disease. Has tried PT and Trigger injections and failed.       The proposed surgical procedure is considered LOW risk.    REVISED CARDIAC RISK INDEX  The patient has the following serious cardiovascular risks for perioperative complications such as (MI, PE, VFib and 3  AV Block):  No serious cardiac risks  INTERPRETATION: 0 risks: Class I (very low risk - 0.4% complication rate)    The patient has the following additional risks for perioperative complications:  No identified additional risks      ICD-10-CM    1. Preop general physical exam Z01.818        RECOMMENDATIONS:         --Patient is to take all scheduled medications on the day of surgery, she takes all medication in the evening. She will stop the ibuprofen now and she will use tylenol for pain until after the second procedure.     APPROVAL GIVEN to proceed with proposed procedure, without further diagnostic evaluation       Signed Electronically by: Alexis Roe NP    Copy of this  evaluation report is provided to requesting physician.    [place] Preop Guidelines    Revised Cardiac Risk Index

## 2019-10-23 ENCOUNTER — ANESTHESIA EVENT (OUTPATIENT)
Dept: SURGERY | Facility: CLINIC | Age: 79
End: 2019-10-23
Payer: COMMERCIAL

## 2019-10-23 ASSESSMENT — LIFESTYLE VARIABLES: TOBACCO_USE: 0

## 2019-10-24 ENCOUNTER — HOSPITAL ENCOUNTER (OUTPATIENT)
Dept: GENERAL RADIOLOGY | Facility: CLINIC | Age: 79
End: 2019-10-24
Attending: ANESTHESIOLOGY | Admitting: ANESTHESIOLOGY
Payer: COMMERCIAL

## 2019-10-24 ENCOUNTER — ANESTHESIA (OUTPATIENT)
Dept: SURGERY | Facility: CLINIC | Age: 79
End: 2019-10-24
Payer: COMMERCIAL

## 2019-10-24 ENCOUNTER — HOSPITAL ENCOUNTER (OUTPATIENT)
Facility: CLINIC | Age: 79
Discharge: HOME | End: 2019-10-24
Attending: ANESTHESIOLOGY | Admitting: ANESTHESIOLOGY
Payer: COMMERCIAL

## 2019-10-24 DIAGNOSIS — M54.2 CERVICALGIA: ICD-10-CM

## 2019-10-24 DIAGNOSIS — M53.3 CHRONIC LEFT SI JOINT PAIN: ICD-10-CM

## 2019-10-24 DIAGNOSIS — G89.29 CHRONIC LEFT SI JOINT PAIN: ICD-10-CM

## 2019-10-24 PROCEDURE — 27096 INJECT SACROILIAC JOINT: CPT | Mod: 50 | Performed by: ANESTHESIOLOGY

## 2019-10-24 PROCEDURE — 37000008 ZZH ANESTHESIA TECHNICAL FEE, 1ST 30 MIN: Performed by: ANESTHESIOLOGY

## 2019-10-24 PROCEDURE — 27096 INJECT SACROILIAC JOINT: CPT | Performed by: ANESTHESIOLOGY

## 2019-10-24 PROCEDURE — 25000128 H RX IP 250 OP 636: Performed by: NURSE ANESTHETIST, CERTIFIED REGISTERED

## 2019-10-24 PROCEDURE — 25000128 H RX IP 250 OP 636: Performed by: ANESTHESIOLOGY

## 2019-10-24 PROCEDURE — 25000125 ZZHC RX 250: Performed by: NURSE ANESTHETIST, CERTIFIED REGISTERED

## 2019-10-24 PROCEDURE — 40000277 XR SURGERY CARM FLUORO LESS THAN 5 MIN W STILLS: Mod: TC

## 2019-10-24 PROCEDURE — 20550 NJX 1 TENDON SHEATH/LIGAMENT: CPT

## 2019-10-24 PROCEDURE — 20550 NJX 1 TENDON SHEATH/LIGAMENT: CPT | Mod: 50 | Performed by: ANESTHESIOLOGY

## 2019-10-24 RX ORDER — ONDANSETRON 2 MG/ML
4 INJECTION INTRAMUSCULAR; INTRAVENOUS EVERY 30 MIN PRN
Status: DISCONTINUED | OUTPATIENT
Start: 2019-10-24 | End: 2019-10-24 | Stop reason: HOSPADM

## 2019-10-24 RX ORDER — NALOXONE HYDROCHLORIDE 0.4 MG/ML
.1-.4 INJECTION, SOLUTION INTRAMUSCULAR; INTRAVENOUS; SUBCUTANEOUS
Status: DISCONTINUED | OUTPATIENT
Start: 2019-10-24 | End: 2019-10-24 | Stop reason: HOSPADM

## 2019-10-24 RX ORDER — IOPAMIDOL 612 MG/ML
INJECTION, SOLUTION INTRATHECAL PRN
Status: DISCONTINUED | OUTPATIENT
Start: 2019-10-24 | End: 2019-10-24 | Stop reason: HOSPADM

## 2019-10-24 RX ORDER — SODIUM CHLORIDE, SODIUM LACTATE, POTASSIUM CHLORIDE, CALCIUM CHLORIDE 600; 310; 30; 20 MG/100ML; MG/100ML; MG/100ML; MG/100ML
INJECTION, SOLUTION INTRAVENOUS CONTINUOUS
Status: DISCONTINUED | OUTPATIENT
Start: 2019-10-24 | End: 2019-10-24 | Stop reason: HOSPADM

## 2019-10-24 RX ORDER — BUPIVACAINE HYDROCHLORIDE 5 MG/ML
INJECTION, SOLUTION PERINEURAL PRN
Status: DISCONTINUED | OUTPATIENT
Start: 2019-10-24 | End: 2019-10-24 | Stop reason: HOSPADM

## 2019-10-24 RX ORDER — PROPOFOL 10 MG/ML
INJECTION, EMULSION INTRAVENOUS PRN
Status: DISCONTINUED | OUTPATIENT
Start: 2019-10-24 | End: 2019-10-24

## 2019-10-24 RX ORDER — MEPERIDINE HYDROCHLORIDE 50 MG/ML
12.5 INJECTION INTRAMUSCULAR; INTRAVENOUS; SUBCUTANEOUS
Status: DISCONTINUED | OUTPATIENT
Start: 2019-10-24 | End: 2019-10-24 | Stop reason: HOSPADM

## 2019-10-24 RX ORDER — TRIAMCINOLONE ACETONIDE 40 MG/ML
INJECTION, SUSPENSION INTRA-ARTICULAR; INTRAMUSCULAR PRN
Status: DISCONTINUED | OUTPATIENT
Start: 2019-10-24 | End: 2019-10-24 | Stop reason: HOSPADM

## 2019-10-24 RX ORDER — ONDANSETRON 4 MG/1
4 TABLET, ORALLY DISINTEGRATING ORAL EVERY 30 MIN PRN
Status: DISCONTINUED | OUTPATIENT
Start: 2019-10-24 | End: 2019-10-24 | Stop reason: HOSPADM

## 2019-10-24 RX ORDER — LIDOCAINE HYDROCHLORIDE 20 MG/ML
INJECTION, SOLUTION INFILTRATION; PERINEURAL PRN
Status: DISCONTINUED | OUTPATIENT
Start: 2019-10-24 | End: 2019-10-24

## 2019-10-24 RX ADMIN — PROPOFOL 50 MG: 10 INJECTION, EMULSION INTRAVENOUS at 15:01

## 2019-10-24 RX ADMIN — LIDOCAINE HYDROCHLORIDE 40 MG: 20 INJECTION, SOLUTION INFILTRATION; PERINEURAL at 15:01

## 2019-10-24 RX ADMIN — PROPOFOL 30 MG: 10 INJECTION, EMULSION INTRAVENOUS at 15:03

## 2019-10-24 NOTE — ANESTHESIA POSTPROCEDURE EVALUATION
Patient: Coby Damon    Procedure(s):  Bilateral Sacroiliac Joint Injections    Diagnosis:Cervicalgia [M54.2]  Diagnosis Additional Information: No value filed.    Anesthesia Type:  MAC    Note:  Anesthesia Post Evaluation    Patient location during evaluation: Phase 2 and Bedside  Patient participation: Able to fully participate in evaluation  Level of consciousness: awake and alert  Pain management: adequate  Airway patency: patent  Cardiovascular status: acceptable  Respiratory status: acceptable  Hydration status: acceptable  PONV: none     Anesthetic complications: None          Last vitals:  There were no vitals filed for this visit.      Electronically Signed By: SHONDA Puentes CRNA  October 24, 2019  3:26 PM

## 2019-10-24 NOTE — OP NOTE
CHIEF COMPLAINT:    1.SI joint dysfunction (Sacroilitis) and pain (724.6)   2 Sacral enthesopathy (720.1)    PROCEDURE:   Fluoroscopically-guided injection of the Bilateral  sacroiliac joints with Bilateral andre Sacroiliac joint ligaments infiltration over the sacrum.    PROCEDURE DETAILS: After written informed consent was obtained from the patient, the patient was escorted to the procedure room.  The patient was placed in the prone position.   A time out was conducted to verify patient identity, procedure to be performed, side, site, allergies and any special requirements.  The skin over the lumbosacral region was prepped and draped in normal sterile fashion. Fluoroscopy was used to identify the posteroinferior region of the sacroiliac joint.  The skin was anesthetized with 2 mL of 1% lidocaine with bicarbonate buffer.  Using fluoroscopic guidance, a 22 gauge, 3.5  Quincke spinal needle was advanced into the joint from an inferior approach.  After negative aspiration, 0.5 ml of Omnipaque contrast was injected showing intra-articular spread of contrast without evidence of intravascular spread.  2.0 mL of solution consisting of 40 mg of Depo-Medrol and 1.5 cc of 0.5% marcaine was injected slowly into the joint.   A second injection at the sacroiliac joint ligaments was then performed.  The middle third of the SI Joint was identified with fluoroscopy. After advancing a 22 gauge, 3.5  Quincke spinal needle to these ligaments with intermittent fluoroscopic guidance,  3 mL of solution consisting of  20 mg of DepoMedrol and 2.75 mL of 0.5 Marcaine was injected periligamentous and intramuscular over the sacroiliac joint ligaments.    This was performed bilaterally.  The patient was monitored with blood pressure and pulse oximetry machines with the assistance of an RN throughout the procedure.  The patient was alert and responsive to questions throughout the procedure.   The patient tolerated the procedure well and was  observed in the post-procedural area.  The patient was dismissed without apparent complications.     DIAGNOSIS:  1.  Bilateral sacroilitis  2.  Bilateral sacral enthesopathy  PLAN:  1. Performed Bilateral   Sacroiliac joint injections.  2. Bilateral SI ligament injections over the sacrum   3. The patient was instructed to fill out a pain form reflecting the local anesthetic phase of the injection and follow-up per Dr. Webster's instructions.        Sawyer Webster MD  Diplomate of the American Board of Anesthesiology, Pain Medicine

## 2019-10-24 NOTE — ANESTHESIA CARE TRANSFER NOTE
Patient: Coby Damon    Procedure(s):  Bilateral Sacroiliac Joint Injections    Diagnosis: Cervicalgia [M54.2]  Diagnosis Additional Information: No value filed.    Anesthesia Type:   MAC     Note:  Airway :Nasal Cannula  Patient transferred to:Phase II  Handoff Report: Identifed the Patient, Identified the Reponsible Provider, Reviewed the pertinent medical history, Discussed the surgical course, Reviewed Intra-OP anesthesia mangement and issues during anesthesia, Set expectations for post-procedure period and Allowed opportunity for questions and acknowledgement of understanding      Vitals: (Last set prior to Anesthesia Care Transfer)    CRNA VITALS  10/24/2019 1443 - 10/24/2019 1525      10/24/2019             Resp Rate (observed):  (!) 6                Electronically Signed By: SHONDA Puentes CRNA  October 24, 2019  3:25 PM

## 2019-10-24 NOTE — ANESTHESIA PREPROCEDURE EVALUATION
Anesthesia Pre-Procedure Evaluation    Patient: Coby Damon   MRN: [id number] : 1940          Preoperative Diagnosis: Cervicalgia [M54.2]    Procedure(s):  INJECTION, SPINE, CERVICAL 7- THORACIC 1, EPIDURAL, TRANSLAMINAR    Past Medical History:   Diagnosis Date     Breast cancer (H) 2004    lumpectomy and 33 lymph nodes removed.     Shingles      Past Surgical History:   Procedure Laterality Date     COLONOSCOPY N/A 2014    Procedure: COLONOSCOPY;  Surgeon: Bandar Guidry MD;  Location: [facility]     HYSTERECTOMY, PAP NO LONGER INDICATED       INJECT EPIDURAL CERVICAL N/A 2015    Procedure: INJECT EPIDURAL CERVICAL;  Surgeon: Sawyer Webster MD;  Location: [facility]     PHACOEMULSIFICATION WITH STANDARD INTRAOCULAR LENS IMPLANT Left 11/15/2018    Procedure: PHACOEMULSIFICATION WITH STANDARD INTRAOCULAR LENS IMPLANT LEFT EYE;  Surgeon: Rian Ford MD;  [facility]     PHACOEMULSIFICATION WITH STANDARD INTRAOCULAR LENS IMPLANT Right 2018    Procedure: PHACOEMULSIFICATION WITH STANDARD INTRAOCULAR LENS IMPLANT RIGHT EYE;  Surgeon: Rian Ford MD;  Location: [facility]       Anesthesia Evaluation     . Pt has had prior anesthetic. Type: General and MAC    No history of anesthetic complications          ROS/MED HX    ENT/Pulmonary:  - neg pulmonary ROS    (-) tobacco use   Neurologic:  - neg neurologic ROS     Cardiovascular:     (+) Dyslipidemia, hypertension----. : . . . :. . Previous cardiac testing date:results:date: results:ECG reviewed date: results:SR w/ first degree AV block date: results:          METS/Exercise Tolerance:     Hematologic:  - neg hematologic  ROS       Musculoskeletal:   (+) arthritis,  -       GI/Hepatic:  - neg GI/hepatic ROS       Renal/Genitourinary:  - ROS Renal section negative   (+) Pt has no history of transplant,       Endo:  - neg endo ROS       Psychiatric:  - neg psychiatric ROS       Infectious Disease:  - neg infectious disease  "ROS       Malignancy:   (+) Malignancy History of Breast  Breast CA Remission status post.         Other:    (+) No chance of pregnancy C-spine cleared: N/A, no H/O Chronic Pain,no other significant disability   - neg other ROS                      Physical Exam  Normal systems: cardiovascular, pulmonary and dental    Airway   Mallampati: II  TM distance: >3 FB  Neck ROM: full    Dental     Cardiovascular   Rhythm and rate: regular and normal      Pulmonary    breath sounds clear to auscultation            Lab Results   Component Value Date    WBC 4.9 06/04/2014    HGB 13.6 06/04/2014    HCT 41.1 06/04/2014     06/04/2014    SED 11 06/04/2014     11/09/2018    POTASSIUM 4.1 11/09/2018    CHLORIDE 107 11/09/2018    CO2 27 11/09/2018    BUN 15 11/09/2018    CR 0.77 11/09/2018     (H) 11/09/2018    ASHLEY 9.1 11/09/2018    ALBUMIN 3.9 11/09/2018    PROTTOTAL 7.4 11/09/2018    ALT 23 11/09/2018    AST 18 11/09/2018    ALKPHOS 80 11/09/2018    BILITOTAL 0.4 11/09/2018    LIPASE 89 06/04/2014       Preop Vitals  BP Readings from Last 3 Encounters:   10/22/19 102/62   10/16/19 116/64   10/11/19 110/62    Pulse Readings from Last 3 Encounters:   10/22/19 82   10/11/19 62   09/30/19 64      Resp Readings from Last 3 Encounters:   10/22/19 18   09/30/19 16   02/11/19 18    SpO2 Readings from Last 3 Encounters:   10/22/19 100%   10/11/19 94%   09/30/19 100%      Temp Readings from Last 1 Encounters:   10/22/19 97.1  F (36.2  C) (Temporal)    Ht Readings from Last 1 Encounters:   10/16/19 1.549 m (5' 1\")      Wt Readings from Last 1 Encounters:   10/22/19 52.1 kg (114 lb 12.8 oz)    Estimated body mass index is 21.69 kg/m  as calculated from the following:    Height as of 10/16/19: 1.549 m (5' 1\").    Weight as of 10/22/19: 52.1 kg (114 lb 12.8 oz).       Anesthesia Plan      History & Physical Review  History and physical reviewed and following examination; no interval change.    ASA Status:  2 .    NPO " Status:  > 6 hours    Plan for MAC with Other induction. Reason for MAC:  Deep or markedly invasive procedure (G8)         Postoperative Care      Consents  Anesthetic plan, risks, benefits and alternatives discussed with:  Patient.  Use of blood products discussed: No .   .                 SHONDA Puentes CRNA

## 2019-10-24 NOTE — DISCHARGE INSTRUCTIONS
Home Care Instructions                Procedure: Epidural injection or joint injection    Activity:    Rest today    Do not work today    Resume normal activity tomorrow    Pain:    You may experience soreness at the injection site for 1 to 3 days.    You may use an ice pack for 20 minutes every 2 hours for the first 24 hours    You may use a heating pad after the first 24 hours    You may use Tylenol  (acetaminophen) every 4 hours or other pain medicines as directed by your physician    Safety  Sedation medicine, if given may remain active for many hours.    It is important for the next 24 hours that you do not:    Drive a car    Operate machines or power tools    Consume alcohol, including beer    Sign any important papers or legal documents    You may experience numbness radiating into your legs or arms, (depending on the procedure location)  This numbness may last several hours.  Until the numb sensation returns to normal please use caution in walking, climbing stairs, stepping out of your vehicle, etc.    Common side effects of steroids:  Not everyone will experience corticosteroid side effects. If side effects are experienced they will gradually subside in the 7-10 day period following an injection.    Most common side effects include:    Flushed face and/or chest    Feeling of warmth, particularly in face but could be overall feeling of warmth    Increased blood sugar in diabetic patients    Menstrual irregularities may occur.  If taking hormone based birth control an alternate method of birth control is recommended    Sleep disturbances and/or mood swings are possible    Leg cramps    Please contact us if you have:  Severe pain   Fever more than 101.5 degrees Fahrenheit  Signs of infection (redness, swelling or drainage)      If you have questions during normal business hours (8am-5pm Monday-Friday) contact the [facility] at [phone]. If you need help after hours, we recommend that you go to a  hospital emergency room or dial 911.

## 2019-11-08 ENCOUNTER — ANESTHESIA (OUTPATIENT)
Dept: SURGERY | Facility: CLINIC | Age: 79
End: 2019-11-08
Payer: COMMERCIAL

## 2019-11-08 ENCOUNTER — HOSPITAL ENCOUNTER (OUTPATIENT)
Dept: GENERAL RADIOLOGY | Facility: CLINIC | Age: 79
End: 2019-11-08
Attending: ANESTHESIOLOGY | Admitting: ANESTHESIOLOGY
Payer: COMMERCIAL

## 2019-11-08 ENCOUNTER — ANESTHESIA EVENT (OUTPATIENT)
Dept: SURGERY | Facility: CLINIC | Age: 79
End: 2019-11-08
Payer: COMMERCIAL

## 2019-11-08 ENCOUNTER — HOSPITAL ENCOUNTER (OUTPATIENT)
Facility: CLINIC | Age: 79
Discharge: HOME | End: 2019-11-08
Attending: ANESTHESIOLOGY | Admitting: ANESTHESIOLOGY
Payer: COMMERCIAL

## 2019-11-08 VITALS
SYSTOLIC BLOOD PRESSURE: 134 MMHG | WEIGHT: 114 LBS | DIASTOLIC BLOOD PRESSURE: 84 MMHG | RESPIRATION RATE: 12 BRPM | HEART RATE: 78 BPM | BODY MASS INDEX: 21.52 KG/M2 | HEIGHT: 61 IN | OXYGEN SATURATION: 94 % | TEMPERATURE: 97.5 F

## 2019-11-08 DIAGNOSIS — M53.3 CHRONIC RIGHT SI JOINT PAIN: ICD-10-CM

## 2019-11-08 DIAGNOSIS — G89.29 CHRONIC RIGHT SI JOINT PAIN: ICD-10-CM

## 2019-11-08 PROCEDURE — 25800030 ZZH RX IP 258 OP 636: Performed by: NURSE ANESTHETIST, CERTIFIED REGISTERED

## 2019-11-08 PROCEDURE — 25000125 ZZHC RX 250: Performed by: NURSE ANESTHETIST, CERTIFIED REGISTERED

## 2019-11-08 PROCEDURE — 62321 NJX INTERLAMINAR CRV/THRC: CPT | Performed by: ANESTHESIOLOGY

## 2019-11-08 PROCEDURE — 37000008 ZZH ANESTHESIA TECHNICAL FEE, 1ST 30 MIN: Performed by: ANESTHESIOLOGY

## 2019-11-08 PROCEDURE — 25000128 H RX IP 250 OP 636: Performed by: NURSE ANESTHETIST, CERTIFIED REGISTERED

## 2019-11-08 PROCEDURE — 40000277 XR SURGERY CARM FLUORO LESS THAN 5 MIN W STILLS

## 2019-11-08 RX ORDER — LIDOCAINE 40 MG/G
CREAM TOPICAL
Status: DISCONTINUED | OUTPATIENT
Start: 2019-11-08 | End: 2019-11-08 | Stop reason: HOSPADM

## 2019-11-08 RX ORDER — LIDOCAINE HYDROCHLORIDE 20 MG/ML
INJECTION, SOLUTION INFILTRATION; PERINEURAL PRN
Status: DISCONTINUED | OUTPATIENT
Start: 2019-11-08 | End: 2019-11-08

## 2019-11-08 RX ORDER — PROPOFOL 10 MG/ML
INJECTION, EMULSION INTRAVENOUS PRN
Status: DISCONTINUED | OUTPATIENT
Start: 2019-11-08 | End: 2019-11-08

## 2019-11-08 RX ORDER — SODIUM CHLORIDE, SODIUM LACTATE, POTASSIUM CHLORIDE, CALCIUM CHLORIDE 600; 310; 30; 20 MG/100ML; MG/100ML; MG/100ML; MG/100ML
INJECTION, SOLUTION INTRAVENOUS CONTINUOUS
Status: DISCONTINUED | OUTPATIENT
Start: 2019-11-08 | End: 2019-11-08 | Stop reason: HOSPADM

## 2019-11-08 RX ADMIN — PROPOFOL 80 MG: 10 INJECTION, EMULSION INTRAVENOUS at 08:06

## 2019-11-08 RX ADMIN — SODIUM CHLORIDE, POTASSIUM CHLORIDE, SODIUM LACTATE AND CALCIUM CHLORIDE: 600; 310; 30; 20 INJECTION, SOLUTION INTRAVENOUS at 07:35

## 2019-11-08 RX ADMIN — LIDOCAINE HYDROCHLORIDE 1 ML: 10 INJECTION, SOLUTION EPIDURAL; INFILTRATION; INTRACAUDAL; PERINEURAL at 07:35

## 2019-11-08 RX ADMIN — LIDOCAINE HYDROCHLORIDE 100 MG: 20 INJECTION, SOLUTION INFILTRATION; PERINEURAL at 08:06

## 2019-11-08 ASSESSMENT — MIFFLIN-ST. JEOR: SCORE: 934.48

## 2019-11-08 ASSESSMENT — LIFESTYLE VARIABLES: TOBACCO_USE: 0

## 2019-11-08 NOTE — OP NOTE
CHIEF COMPLAINT: Neck pain secondary to cervical spondylosis and cervical disc degeneration  PROCEDURE: C6-7 Interlaminar epidural steroid injection using fluoroscopic guidance with contrast dye.   PROCEDURE DETAILS: After written informed consent was obtained from the patient, the patient was escorted to the procedure room.  The patient was placed in the prone position.  A  time out  was conducted to verify patient identity, procedure to be performed, side, site, allergies and any special requirements.  The skin over the neck and upper back region were prepped and draped in normal sterile fashion. Fluoroscopy was used to identify the C6-7 interspace in an AP view and the skin was anesthetized with 2 mL of 1% lidocaine with bicarbonate buffer.  A 20-gauge 3-1/2 inch Tuohy needle was advanced using the loss of resistance technique with preservative free normal saline with fluoroscopic guidance. After negative aspiration for CSF and blood, 1.5 cc of Isovue contrast dye was injected revealing the appropriate cervical epidurogram without evidence of intrathecal or intravascular spread. Following this, a 3-mL solution of 40 mg of triamcinolone with 2 cc preservative-free normal saline was slowly injected.  After injection of the medication, as the needle tip was withdrawn, it was flushed with local anesthetic.  The patient was monitored with blood pressure and pulse oximetry machines with the assistance of an RN throughout the procedure.  The patient was alert and responsive to questions throughout the procedure.   The patient tolerated the procedure well and was observed in the post-procedural area.  The patient was dismissed without apparent complications.     DIAGNOSIS:  1. Neck pain secondary to cervical spondylosis and cervical disc degeneration  PLAN:  1. Performed a C6-7 interlaminar epidural steroid injection.   2. The patient was instructed to call the [facility] if today's procedure is not  helpful.    Sawyer Webster MD  Diplomate of the American Board of Anesthesiology, Pain Medicine

## 2019-11-08 NOTE — DISCHARGE INSTRUCTIONS
Home Care Instructions                Procedure: Epidural injection or joint injection    Activity:    Rest today    Do not work today    Resume normal activity tomorrow    Pain:    You may experience soreness at the injection site for 1 to 3 days.    You may use an ice pack for 20 minutes every 2 hours for the first 24 hours    You may use a heating pad after the first 24 hours    You may use Tylenol  (acetaminophen) every 4 hours or other pain medicines as directed by your physician    Safety  Sedation medicine, if given may remain active for many hours.    It is important for the next 24 hours that you do not:    Drive a car    Operate machines or power tools    Consume alcohol, including beer    Sign any important papers or legal documents    You may experience numbness radiating into your legs or arms, (depending on the procedure location)  This numbness may last several hours.  Until the numb sensation returns to normal please use caution in walking, climbing stairs, stepping out of your vehicle, etc.    Common side effects of steroids:  Not everyone will experience corticosteroid side effects. If side effects are experienced they will gradually subside in the 7-10 day period following an injection.    Most common side effects include:    Flushed face and/or chest    Feeling of warmth, particularly in face but could be overall feeling of warmth    Increased blood sugar in diabetic patients    Menstrual irregularities may occur.  If taking hormone based birth control an alternate method of birth control is recommended    Sleep disturbances and/or mood swings are possible    Leg cramps    Please contact us if you have:  Severe pain   Fever more than 101.5 degrees Fahrenheit  Signs of infection (redness, swelling or drainage)      If you have questions during normal business hours (8am-5pm Monday-Friday) contact the [facility] at [phone]. If you need help after hours, we recommend that you go to a  hospital emergency room or dial 911.

## 2019-11-08 NOTE — ANESTHESIA POSTPROCEDURE EVALUATION
Patient: Coby Damon    Procedure(s):  Sacroiliac Therapeutic Injection Right    Diagnosis:Chronic right SI joint pain [M53.3, G89.29]  Diagnosis Additional Information: No value filed.    Anesthesia Type:  MAC    Note:  Anesthesia Post Evaluation    Patient location during evaluation: Phase 2 and Bedside  Patient participation: Able to fully participate in evaluation  Level of consciousness: awake and alert  Pain management: satisfactory to patient  Airway patency: patent  Cardiovascular status: stable  Respiratory status: room air and spontaneous ventilation  Hydration status: stable  PONV: none     Anesthetic complications: None    Comments: Appear to tolerate MAC with IV sedation well without anesthesia related problems / complications noted.  Pain level satisfactory per patient. No N  /  V.  No complaints per patient.  Will follow as needed.        Last vitals:  Vitals:    11/08/19 0710 11/08/19 0814   BP: 123/77 121/69   Pulse:  71   Resp: 18 12   Temp: 97.5  F (36.4  C)    SpO2: 99% 99%         Electronically Signed By: SHONDA Mack CRNA  November 8, 2019  8:21 AM

## 2019-11-08 NOTE — ANESTHESIA CARE TRANSFER NOTE
Patient: Coby Damon    Procedure(s):  Sacroiliac Therapeutic Injection Right    Diagnosis: Chronic right SI joint pain [M53.3, G89.29]  Diagnosis Additional Information: No value filed.    Anesthesia Type:   MAC     Note:  Airway :Room Air  Patient transferred to:Phase II  Handoff Report: Identifed the Patient, Identified the Reponsible Provider, Reviewed the pertinent medical history, Discussed the surgical course, Reviewed Intra-OP anesthesia mangement and issues during anesthesia, Set expectations for post-procedure period and Allowed opportunity for questions and acknowledgement of understanding      Vitals: (Last set prior to Anesthesia Care Transfer)    CRNA VITALS  11/8/2019 0742 - 11/8/2019 0816      11/8/2019             Resp Rate (observed):  (!) 6                Electronically Signed By: SHONDA Mack CRNA  November 8, 2019  8:16 AM

## 2019-11-08 NOTE — ANESTHESIA PREPROCEDURE EVALUATION
Anesthesia Pre-Procedure Evaluation    Patient: Coby Damon   MRN: [id number] : 1940          Preoperative Diagnosis: Chronic right SI joint pain [M53.3, G89.29]    Procedure(s):  Sacroiliac Therapeutic Injection Right    Past Medical History:   Diagnosis Date     Breast cancer (H) 2004    lumpectomy and 33 lymph nodes removed.     Shingles      Past Surgical History:   Procedure Laterality Date     COLONOSCOPY N/A 2014    Procedure: COLONOSCOPY;  Surgeon: Bandar Guidry MD;  Location: [facility]     HYSTERECTOMY, PAP NO LONGER INDICATED       INJECT EPIDURAL CERVICAL N/A 2015    Procedure: INJECT EPIDURAL CERVICAL;  Surgeon: Sawyer Webster MD;  Location: [facility]     INJECT JOINT SACROILIAC Bilateral 10/24/2019    Procedure: Bilateral Sacroiliac Joint Injections;  Surgeon: Sawyer Webster MD;  Location: [facility]     PHACOEMULSIFICATION WITH STANDARD INTRAOCULAR LENS IMPLANT Left 11/15/2018    Procedure: PHACOEMULSIFICATION WITH STANDARD INTRAOCULAR LENS IMPLANT LEFT EYE;  Surgeon: Rian Ford MD;  Location: [facility]     PHACOEMULSIFICATION WITH STANDARD INTRAOCULAR LENS IMPLANT Right 2018    Procedure: PHACOEMULSIFICATION WITH STANDARD INTRAOCULAR LENS IMPLANT RIGHT EYE;  Surgeon: Rian Ford MD;  Location: [facility]       Anesthesia Evaluation     . Pt has had prior anesthetic. Type: General and MAC    No history of anesthetic complications          ROS/MED HX    ENT/Pulmonary:  - neg pulmonary ROS    (-) tobacco use   Neurologic:  - neg neurologic ROS     Cardiovascular:     (+) Dyslipidemia, hypertension----. : . . . :. . Previous cardiac testing date:results:date: results:ECG reviewed date: results:SR w/ first degree AV block date: results:          METS/Exercise Tolerance:     Hematologic:  - neg hematologic  ROS       Musculoskeletal:   (+) arthritis,  -       GI/Hepatic:  - neg GI/hepatic ROS       Renal/Genitourinary:  - ROS Renal section negative   (+) Pt has  "no history of transplant,       Endo:  - neg endo ROS       Psychiatric:  - neg psychiatric ROS       Infectious Disease:  - neg infectious disease ROS       Malignancy:   (+) Malignancy History of Breast  Breast CA Remission status post.         Other:    (+) No chance of pregnancy C-spine cleared: N/A, no H/O Chronic Pain,no other significant disability   - neg other ROS                      Physical Exam  Normal systems: cardiovascular, pulmonary and dental    Airway   Mallampati: II  TM distance: >3 FB  Neck ROM: full    Dental     Cardiovascular   Rhythm and rate: regular and normal      Pulmonary    breath sounds clear to auscultation    Other findings: Last dose Lisinopril was last p.m.        Lab Results   Component Value Date    WBC 4.9 06/04/2014    HGB 13.6 06/04/2014    HCT 41.1 06/04/2014     06/04/2014    SED 11 06/04/2014     11/09/2018    POTASSIUM 4.1 11/09/2018    CHLORIDE 107 11/09/2018    CO2 27 11/09/2018    BUN 15 11/09/2018    CR 0.77 11/09/2018     (H) 11/09/2018    ASHLEY 9.1 11/09/2018    ALBUMIN 3.9 11/09/2018    PROTTOTAL 7.4 11/09/2018    ALT 23 11/09/2018    AST 18 11/09/2018    ALKPHOS 80 11/09/2018    BILITOTAL 0.4 11/09/2018    LIPASE 89 06/04/2014       Preop Vitals  BP Readings from Last 3 Encounters:   10/22/19 102/62   10/16/19 116/64   10/11/19 110/62    Pulse Readings from Last 3 Encounters:   10/22/19 82   10/11/19 62   09/30/19 64      Resp Readings from Last 3 Encounters:   10/22/19 18   09/30/19 16   02/11/19 18    SpO2 Readings from Last 3 Encounters:   10/22/19 100%   10/11/19 94%   09/30/19 100%      Temp Readings from Last 1 Encounters:   10/22/19 97.1  F (36.2  C) (Temporal)    Ht Readings from Last 1 Encounters:   10/16/19 1.549 m (5' 1\")      Wt Readings from Last 1 Encounters:   10/22/19 52.1 kg (114 lb 12.8 oz)    Estimated body mass index is 21.69 kg/m  as calculated from the following:    Height as of 10/16/19: 1.549 m (5' 1\").    Weight as of " 10/22/19: 52.1 kg (114 lb 12.8 oz).       Anesthesia Plan      History & Physical Review  History and physical reviewed and following examination; no interval change.    ASA Status:  2 .    NPO Status:  > 6 hours    Plan for MAC with Other induction. Reason for MAC:  Deep or markedly invasive procedure (G8)         Postoperative Care  Postoperative pain management:  Oral pain medications.      Consents  Anesthetic plan, risks, benefits and alternatives discussed with:  Patient.  Use of blood products discussed: No .   .                 SHONDA Mack CRNA

## 2019-11-14 DIAGNOSIS — I10 HYPERTENSION GOAL BP (BLOOD PRESSURE) < 140/90: ICD-10-CM

## 2019-11-14 RX ORDER — LISINOPRIL 10 MG/1
TABLET ORAL
Qty: 30 TABLET | Refills: 0 | Status: SHIPPED | OUTPATIENT
Start: 2019-11-14 | End: 2019-12-13

## 2019-11-14 NOTE — TELEPHONE ENCOUNTER
Called Coby and relayed message.  Coby declines making an appointment at this time but states she is having a Biometrics type exam and her [facility] in [facility] next Thursday and after she gets her results and lab work back from that she will make an appointment to see Dr Will.

## 2019-11-14 NOTE — TELEPHONE ENCOUNTER
"Cassidy refill given per RN protocol.   Please contact patient to have them schedule the following:  Due for annual exam and labs after 11/9/19     Lisinopril  Last Written Prescription Date:  11/9/2018  Last Fill Quantity: 90,  # refills: 3   Last office visit: 10/22/2019 with prescribing provider:  Jyothi   Future Office Visit:      Requested Prescriptions   Pending Prescriptions Disp Refills     lisinopril (PRINIVIL/ZESTRIL) 10 MG tablet [Pharmacy Med Name: LISINOPRIL 10MG  TAB] 90 tablet 3     Sig: TAKE 1 TABLET BY MOUTH ONCE DAILY       ACE Inhibitors (Including Combos) Protocol Failed - 11/14/2019  5:30 AM        Failed - Normal serum creatinine on file in past 12 months     Recent Labs   Lab Test 11/09/18  1004   CR 0.77             Failed - Normal serum potassium on file in past 12 months     Recent Labs   Lab Test 11/09/18  1004   POTASSIUM 4.1             Passed - Blood pressure under 140/90 in past 12 months     BP Readings from Last 3 Encounters:   11/08/19 134/84   10/22/19 102/62   10/16/19 116/64                 Passed - Recent (12 mo) or future (30 days) visit within the authorizing provider's specialty     Patient has had an office visit with the authorizing provider or a provider within the authorizing providers department within the previous 12 mos or has a future within next 30 days. See \"Patient Info\" tab in inbasket, or \"Choose Columns\" in Meds & Orders section of the refill encounter.              Passed - Medication is active on med list        Passed - Patient is age 18 or older        Passed - No active pregnancy on record        Passed - No positive pregnancy test within past 12 months        Jaki Abbasi RN on 11/14/2019 at 9:18 AM    "

## 2019-11-26 ENCOUNTER — HOSPITAL ENCOUNTER (OUTPATIENT)
Dept: MAMMOGRAPHY | Facility: CLINIC | Age: 79
Discharge: HOME | End: 2019-11-26
Attending: FAMILY MEDICINE | Admitting: FAMILY MEDICINE
Payer: COMMERCIAL

## 2019-11-26 DIAGNOSIS — Z12.31 VISIT FOR SCREENING MAMMOGRAM: ICD-10-CM

## 2019-11-26 PROCEDURE — 77063 BREAST TOMOSYNTHESIS BI: CPT

## 2019-12-13 ENCOUNTER — OFFICE VISIT (OUTPATIENT)
Dept: FAMILY MEDICINE | Facility: CLINIC | Age: 79
End: 2019-12-13
Payer: COMMERCIAL

## 2019-12-13 VITALS
SYSTOLIC BLOOD PRESSURE: 134 MMHG | BODY MASS INDEX: 21.26 KG/M2 | HEART RATE: 96 BPM | TEMPERATURE: 97.4 F | DIASTOLIC BLOOD PRESSURE: 80 MMHG | HEIGHT: 61 IN | RESPIRATION RATE: 22 BRPM | WEIGHT: 112.6 LBS | OXYGEN SATURATION: 96 %

## 2019-12-13 DIAGNOSIS — E78.5 HYPERLIPIDEMIA LDL GOAL <130: ICD-10-CM

## 2019-12-13 DIAGNOSIS — M81.0 SENILE OSTEOPOROSIS: ICD-10-CM

## 2019-12-13 DIAGNOSIS — M54.41 CHRONIC MIDLINE LOW BACK PAIN WITH RIGHT-SIDED SCIATICA: ICD-10-CM

## 2019-12-13 DIAGNOSIS — I10 HYPERTENSION GOAL BP (BLOOD PRESSURE) < 140/90: ICD-10-CM

## 2019-12-13 DIAGNOSIS — Z00.00 ENCOUNTER FOR MEDICARE ANNUAL WELLNESS EXAM: Primary | ICD-10-CM

## 2019-12-13 DIAGNOSIS — G89.29 CHRONIC MIDLINE LOW BACK PAIN WITH RIGHT-SIDED SCIATICA: ICD-10-CM

## 2019-12-13 DIAGNOSIS — E28.39 ESTROGEN DEFICIENCY: ICD-10-CM

## 2019-12-13 LAB
ALBUMIN SERPL-MCNC: 4 G/DL (ref 3.4–5)
ALP SERPL-CCNC: 96 U/L (ref 40–150)
ALT SERPL W P-5'-P-CCNC: 20 U/L (ref 0–50)
ANION GAP SERPL CALCULATED.3IONS-SCNC: 4 MMOL/L (ref 3–14)
AST SERPL W P-5'-P-CCNC: 19 U/L (ref 0–45)
BILIRUB SERPL-MCNC: 0.3 MG/DL (ref 0.2–1.3)
BUN SERPL-MCNC: 12 MG/DL (ref 7–30)
CALCIUM SERPL-MCNC: 9.3 MG/DL (ref 8.5–10.1)
CHLORIDE SERPL-SCNC: 105 MMOL/L (ref 94–109)
CHOLEST SERPL-MCNC: 210 MG/DL
CO2 SERPL-SCNC: 29 MMOL/L (ref 20–32)
CREAT SERPL-MCNC: 0.78 MG/DL (ref 0.52–1.04)
GFR SERPL CREATININE-BSD FRML MDRD: 72 ML/MIN/{1.73_M2}
GLUCOSE SERPL-MCNC: 95 MG/DL (ref 70–99)
HDLC SERPL-MCNC: 55 MG/DL
LDLC SERPL CALC-MCNC: 128 MG/DL
NONHDLC SERPL-MCNC: 155 MG/DL
POTASSIUM SERPL-SCNC: 4.1 MMOL/L (ref 3.4–5.3)
PROT SERPL-MCNC: 7.7 G/DL (ref 6.8–8.8)
SODIUM SERPL-SCNC: 138 MMOL/L (ref 133–144)
TRIGL SERPL-MCNC: 136 MG/DL

## 2019-12-13 PROCEDURE — 80053 COMPREHEN METABOLIC PANEL: CPT | Performed by: FAMILY MEDICINE

## 2019-12-13 PROCEDURE — 36415 COLL VENOUS BLD VENIPUNCTURE: CPT | Performed by: FAMILY MEDICINE

## 2019-12-13 PROCEDURE — 80061 LIPID PANEL: CPT | Performed by: FAMILY MEDICINE

## 2019-12-13 PROCEDURE — 99397 PER PM REEVAL EST PAT 65+ YR: CPT | Performed by: FAMILY MEDICINE

## 2019-12-13 RX ORDER — LISINOPRIL 10 MG/1
10 TABLET ORAL DAILY
Qty: 90 TABLET | Refills: 3 | Status: SHIPPED | OUTPATIENT
Start: 2019-12-13 | End: 2020-12-04

## 2019-12-13 ASSESSMENT — MIFFLIN-ST. JEOR: SCORE: 928.13

## 2019-12-13 NOTE — LETTER
December 16, 2019      Coby Damon  [address] [facility] [address]        Dear ,    We are writing to inform you of your test results.    Labs from last week show your cholesterol was 210 this is an improvement over 222 a year ago.  Still a little bit above the normal range.  Chemistry panel including blood sugar was normal.     Resulted Orders   Lipid Profile   Result Value Ref Range    Cholesterol 210 (H) <200 mg/dL      Comment:      Desirable:       <200 mg/dl    Triglycerides 136 <150 mg/dL      Comment:      Non Fasting    HDL Cholesterol 55 >49 mg/dL    LDL Cholesterol Calculated 128 (H) <100 mg/dL      Comment:      Above desirable:  100-129 mg/dl  Borderline High:  130-159 mg/dL  High:             160-189 mg/dL  Very high:       >189 mg/dl      Non HDL Cholesterol 155 (H) <130 mg/dL      Comment:      Above Desirable:  130-159 mg/dl  Borderline high:  160-189 mg/dl  High:             190-219 mg/dl  Very high:       >219 mg/dl     Comprehensive metabolic panel   Result Value Ref Range    Sodium 138 133 - 144 mmol/L    Potassium 4.1 3.4 - 5.3 mmol/L    Chloride 105 94 - 109 mmol/L    Carbon Dioxide 29 20 - 32 mmol/L    Anion Gap 4 3 - 14 mmol/L    Glucose 95 70 - 99 mg/dL      Comment:      Non Fasting    Urea Nitrogen 12 7 - 30 mg/dL    Creatinine 0.78 0.52 - 1.04 mg/dL    GFR Estimate 72 >60 mL/min/[1.73_m2]      Comment:      Non  GFR Calc  Starting 12/18/2018, serum creatinine based estimated GFR (eGFR) will be   calculated using the Chronic Kidney Disease Epidemiology Collaboration   (CKD-EPI) equation.      GFR Estimate If Black 84 >60 mL/min/[1.73_m2]      Comment:       GFR Calc  Starting 12/18/2018, serum creatinine based estimated GFR (eGFR) will be   calculated using the Chronic Kidney Disease Epidemiology Collaboration   (CKD-EPI) equation.      Calcium 9.3 8.5 - 10.1 mg/dL    Bilirubin Total 0.3 0.2 - 1.3 mg/dL    Albumin 4.0 3.4 - 5.0 g/dL     Protein Total 7.7 6.8 - 8.8 g/dL    Alkaline Phosphatase 96 40 - 150 U/L    ALT 20 0 - 50 U/L    AST 19 0 - 45 U/L       If you have any questions or concerns, please call the clinic at the number listed above.       Sincerely,        Bandar Will MD

## 2019-12-13 NOTE — PATIENT INSTRUCTIONS
Patient Education   Personalized Prevention Plan  You are due for the preventive services outlined below.  Your care team is available to assist you in scheduling these services.  If you have already completed any of these items, please share that information with your care team to update in your medical record.  Health Maintenance Due   Topic Date Due     Zoster (Shingles) Vaccine (1 of 2) 01/08/1991     Annual Wellness Visit  11/13/2015     PHQ-2  01/01/2019     Pneumococcal Vaccine (2 of 2 - PPSV23) 04/23/2019     FALL RISK ASSESSMENT  11/09/2019

## 2019-12-13 NOTE — PROGRESS NOTES
SUBJECTIVE:   Coby Damon is a 79 year old female who presents for Preventive Visit.      Are you in the first 12 months of your Medicare coverage?  No    HPI  Do you feel safe in your environment? Yes    Have you ever done Advance Care Planning? (For example, a Health Directive, POLST, or a discussion with a medical provider or your loved ones about your wishes): Yes, patient states has an Advance Care Planning document and will bring a copy to the clinic.      Fall risk     click delete button to remove this line now  Cognitive Screening   1) Repeat 3 items (Leader, Season, Table)      2) Clock draw:   NORMAL  3) 3 item recall: Recalls 3 objects  Results: 3 items recalled: COGNITIVE IMPAIRMENT LESS LIKELY    Mini-CogTM Copyright S Liu. Licensed by the author for use in [employer]; reprinted with permission (joseph@.[email or web address]). All rights reserved.      Do you have sleep apnea, excessive snoring or daytime drowsiness?: no    Reviewed and updated as needed this visit by clinical staff         Reviewed and updated as needed this visit by Provider        Social History     Tobacco Use     Smoking status: Never Smoker     Smokeless tobacco: Never Used   Substance Use Topics     Alcohol use: Yes     Comment:  1 glass of wine with dinner      If you drink alcohol do you typically have >3 drinks per day or >7 drinks per week? No    Alcohol Use 11/13/2014   Prescreen: >3 drinks/day or >7 drinks/week? The patient does not drink >3 drinks per day nor >7 drinks per week.               Current providers sharing in care for this patient include: Neurosurgery  Patient Care Team:  Bandar Will MD as PCP - General (Family Practice)  Bandar Will MD as Assigned PCP    The following health maintenance items are reviewed in Epic and correct as of today:  Health Maintenance   Topic Date Due     ZOSTER IMMUNIZATION (1 of 2) 01/08/1991     MEDICARE ANNUAL WELLNESS VISIT  11/13/2015     PHQ-2  01/01/2019  "    PNEUMOCOCCAL IMMUNIZATION 65+ LOW/MEDIUM RISK (2 of 2 - PPSV23) 04/23/2019     FALL RISK ASSESSMENT  11/09/2019     ADVANCE CARE PLANNING  09/22/2022     LIPID  11/09/2023     DTAP/TDAP/TD IMMUNIZATION (2 - Td) 05/23/2026     DEXA  Completed     INFLUENZA VACCINE  Completed     IPV IMMUNIZATION  Aged Out     MENINGITIS IMMUNIZATION  Aged Out     Just recently had steroid injection in her cervical spine which did help.  Previous to this she had a steroid injection into the sacroiliac joints which did not help.  Can use to have pain in the lower back right side and into her right hip.      Review of Systems  Constitutional, HEENT, cardiovascular, pulmonary, GI, , musculoskeletal, neuro, skin, endocrine and psych systems are negative, except as otherwise noted.    OBJECTIVE:   There were no vitals taken for this visit. Estimated body mass index is 21.54 kg/m  as calculated from the following:    Height as of 11/8/19: 1.549 m (5' 1\").    Weight as of 11/8/19: 51.7 kg (114 lb).  Physical Exam  GENERAL: healthy, alert and no distress  EYES: Eyes grossly normal to inspection, PERRL and conjunctivae and sclerae normal  HENT: ear canals and TM's normal, nose and mouth without ulcers or lesions  NECK: no adenopathy, no asymmetry, masses, or scars and thyroid normal to palpation  RESP: lungs clear to auscultation - no rales, rhonchi or wheezes  CV: regular rate and rhythm, normal S1 S2, no S3 or S4, no murmur, click or rub, no peripheral edema and peripheral pulses strong  ABDOMEN: soft, nontender, no hepatosplenomegaly, no masses and bowel sounds normal  MS: no gross musculoskeletal defects noted, no edema  SKIN: no suspicious lesions or rashes  NEURO: Normal strength and tone, mentation intact and speech normal  PSYCH: mentation appears normal, affect normal/bright      Diagnostic Test Results:  Labs reviewed in Epic  Results for orders placed or performed in visit on 12/13/19 (from the past 24 hour(s))   Lipid Profile "   Result Value Ref Range    Cholesterol 210 (H) <200 mg/dL    Triglycerides 136 <150 mg/dL    HDL Cholesterol 55 >49 mg/dL    LDL Cholesterol Calculated 128 (H) <100 mg/dL    Non HDL Cholesterol 155 (H) <130 mg/dL   Comprehensive metabolic panel   Result Value Ref Range    Sodium 138 133 - 144 mmol/L    Potassium 4.1 3.4 - 5.3 mmol/L    Chloride 105 94 - 109 mmol/L    Carbon Dioxide 29 20 - 32 mmol/L    Anion Gap 4 3 - 14 mmol/L    Glucose 95 70 - 99 mg/dL    Urea Nitrogen 12 7 - 30 mg/dL    Creatinine 0.78 0.52 - 1.04 mg/dL    GFR Estimate 72 >60 mL/min/[1.73_m2]    GFR Estimate If Black 84 >60 mL/min/[1.73_m2]    Calcium 9.3 8.5 - 10.1 mg/dL    Bilirubin Total 0.3 0.2 - 1.3 mg/dL    Albumin 4.0 3.4 - 5.0 g/dL    Protein Total 7.7 6.8 - 8.8 g/dL    Alkaline Phosphatase 96 40 - 150 U/L    ALT 20 0 - 50 U/L    AST 19 0 - 45 U/L       ASSESSMENT / PLAN:   1. Encounter for Medicare annual wellness exam  See below.  Brings in a copy of [facility] screening which checked for an abdominal aortic aneurysm, peripheral artery disease, carotid artery disease and these were all normal.  She did test somewhat positive for osteoporosis.    2. Hypertension goal BP (blood pressure) < 140/90  Refilled and will notify her with results.  - lisinopril (PRINIVIL/ZESTRIL) 10 MG tablet; Take 1 tablet (10 mg) by mouth daily  Dispense: 90 tablet; Refill: 3  - Comprehensive metabolic panel    3. Hyperlipidemia LDL goal <130  We will notify with results.  - Lipid Profile    4. Senile osteoporosis  We will set up DEXA scan.    5. Estrogen deficiency  DEXA scan.  - DX Hip/Pelvis/Spine; Future    6. Chronic midline low back pain with right-sided sciatica  She wants to wait to do anything on this for right now she had been seeing a chronic pain clinic but does not want to continue with that at this time.      COUNSELING:  Reviewed preventive health counseling, as reflected in patient instructions       Regular exercise       Healthy  "diet/nutrition       Vision screening    Estimated body mass index is 21.54 kg/m  as calculated from the following:    Height as of 11/8/19: 1.549 m (5' 1\").    Weight as of 11/8/19: 51.7 kg (114 lb).    Weight management plan noted, stable and monitoring     reports that she has never smoked. She has never used smokeless tobacco.      Appropriate preventive services were discussed with this patient, including applicable screening as appropriate for cardiovascular disease, diabetes, osteopenia/osteoporosis, and glaucoma.  As appropriate for age/gender, discussed screening for colorectal cancer, prostate cancer, breast cancer, and cervical cancer. Checklist reviewing preventive services available has been given to the patient.    Reviewed patients plan of care and provided an AVS. The Basic Care Plan (routine screening as documented in Health Maintenance) for Coby meets the Care Plan requirement. This Care Plan has been established and reviewed with the Patient.    Counseling Resources:  ATP IV Guidelines  Pooled Cohorts Equation Calculator  Breast Cancer Risk Calculator  FRAX Risk Assessment  ICSI Preventive Guidelines  Dietary Guidelines for Americans, 2010  USDA's MyPlate  ASA Prophylaxis  Lung CA Screening    Bandar Will MD  [facility]    Identified Health Risks:  "

## 2019-12-16 NOTE — RESULT ENCOUNTER NOTE
Labs from last week show your cholesterol was 210 this is an improvement over 222 a year ago.  Still a little bit above the normal range.  Chemistry panel including blood sugar was normal.

## 2019-12-18 ENCOUNTER — HOSPITAL ENCOUNTER (OUTPATIENT)
Dept: BONE DENSITY | Facility: CLINIC | Age: 79
Discharge: HOME | End: 2019-12-18
Attending: FAMILY MEDICINE | Admitting: FAMILY MEDICINE
Payer: COMMERCIAL

## 2019-12-18 DIAGNOSIS — E28.39 ESTROGEN DEFICIENCY: ICD-10-CM

## 2019-12-18 PROCEDURE — 77080 DXA BONE DENSITY AXIAL: CPT

## 2019-12-26 ENCOUNTER — TELEPHONE (OUTPATIENT)
Dept: FAMILY MEDICINE | Facility: CLINIC | Age: 79
End: 2019-12-26

## 2019-12-26 NOTE — RESULT ENCOUNTER NOTE
DEXA scan showed osteoporosis with a significant decline in the average bone mineral density especially in the hip area since the most recent study.  Not sure if she has been on anything for this in the past.  At minimum needs calcium and vitamin D supplementation and I would recommend she be on medication such as Fosamax.  She can set up an appointment to come in and discuss what the options are

## 2020-01-08 ENCOUNTER — OFFICE VISIT (OUTPATIENT)
Dept: FAMILY MEDICINE | Facility: CLINIC | Age: 80
End: 2020-01-08
Payer: COMMERCIAL

## 2020-01-08 VITALS
DIASTOLIC BLOOD PRESSURE: 64 MMHG | BODY MASS INDEX: 21.45 KG/M2 | RESPIRATION RATE: 16 BRPM | TEMPERATURE: 98.2 F | SYSTOLIC BLOOD PRESSURE: 116 MMHG | OXYGEN SATURATION: 98 % | WEIGHT: 113.6 LBS | HEIGHT: 61 IN | HEART RATE: 83 BPM

## 2020-01-08 DIAGNOSIS — M81.0 AGE-RELATED OSTEOPOROSIS WITHOUT CURRENT PATHOLOGICAL FRACTURE: Primary | ICD-10-CM

## 2020-01-08 PROCEDURE — 99213 OFFICE O/P EST LOW 20 MIN: CPT | Performed by: FAMILY MEDICINE

## 2020-01-08 RX ORDER — ALENDRONATE SODIUM 70 MG/1
70 TABLET ORAL
Qty: 12 TABLET | Refills: 3 | Status: SHIPPED | OUTPATIENT
Start: 2020-01-08 | End: 2021-01-05

## 2020-01-08 ASSESSMENT — MIFFLIN-ST. JEOR: SCORE: 927.67

## 2020-01-08 NOTE — PROGRESS NOTES
Subjective     Coby Damon is a 80 year old female who presents to clinic today for the following health issues:    HPI     is in clinic to go over her DEXA scan results.          SUBJECTIVE:  Coby  is a 80 year old female who presents for: Go over her ostial porosis.  She had a DEXA scan which showed increased in severity from a scan 5 years ago.  She does take supplemental vitamin D and calcium she does drink milk and eat cheese.  She has yogurt.    Past Medical History:   Diagnosis Date     Breast cancer (H) 2004    lumpectomy and 33 lymph nodes removed.     Shingles      Past Surgical History:   Procedure Laterality Date     COLONOSCOPY N/A 12/12/2014    Procedure: COLONOSCOPY;  Surgeon: Bandar Guidry MD;  Location: [facility]     HYSTERECTOMY, PAP NO LONGER INDICATED       INJECT EPIDURAL CERVICAL N/A 9/23/2015    Procedure: INJECT EPIDURAL CERVICAL;  Surgeon: Sawyer Webster MD;  Location: [facility]     INJECT EPIDURAL CERVICAL Right 11/8/2019    Procedure: Cervical 6-7 Spine Injection;  Surgeon: Sawyer Webster MD;  Location: [facility]     INJECT JOINT SACROILIAC Bilateral 10/24/2019    Procedure: Bilateral Sacroiliac Joint Injections;  Surgeon: Sawyer Webster MD;  Location: [facility]     PHACOEMULSIFICATION WITH STANDARD INTRAOCULAR LENS IMPLANT Left 11/15/2018    Procedure: PHACOEMULSIFICATION WITH STANDARD INTRAOCULAR LENS IMPLANT LEFT EYE;  Surgeon: Rian Ford MD;  Location: [facility]     PHACOEMULSIFICATION WITH STANDARD INTRAOCULAR LENS IMPLANT Right 11/29/2018    Procedure: PHACOEMULSIFICATION WITH STANDARD INTRAOCULAR LENS IMPLANT RIGHT EYE;  Surgeon: Rian Ford MD;  Location: [facility]     Social History     Tobacco Use     Smoking status: Never Smoker     Smokeless tobacco: Never Used   Substance Use Topics     Alcohol use: Yes     Comment:  1 glass of wine with dinner      Current Outpatient Medications   Medication Sig Dispense Refill     acetaminophen (TYLENOL) 325 MG tablet Take  "325-650 mg by mouth every 6 hours as needed       alendronate (FOSAMAX) 70 MG tablet Take 1 tablet (70 mg) by mouth every 7 days 12 tablet 3     Cholecalciferol (VITAMIN D) 2000 UNITS tablet Take 2,000 Units by mouth daily       gabapentin (NEURONTIN) 300 MG capsule TAKE 1 CAPSULE BY MOUTH TWICE DAILY. 180 capsule 1     IBUPROFEN PO Take 400 mg by mouth every 6 hours as needed for moderate pain       lisinopril (PRINIVIL/ZESTRIL) 10 MG tablet Take 1 tablet (10 mg) by mouth daily 90 tablet 3     Multiple Vitamins-Minerals (MULTIVITAMIN OR)        omega 3 1000 MG CAPS Take 1 g by mouth daily 90 capsule      vitamin B complex with vitamin C (VITAMIN  B COMPLEX) tablet Take 1 tablet by mouth daily       lidocaine (LIDODERM) 5 % patch Apply 1-3 patches to area of pain. On for 12 hours off for 12 hours. Okay to cut for better fit (Patient not taking: Reported on 1/8/2020) 90 patch 0       REVIEW OF SYSTEMS:   5 point ROS negative except as noted above in HPI, including Gen., Resp, CV, GI &  system review.     OBJECTIVE:  Vitals: /64 (BP Location: Right arm, Patient Position: Sitting, Cuff Size: Adult Regular)   Pulse 83   Temp 98.2  F (36.8  C) (Temporal)   Resp 16   Ht 1.549 m (5' 1\")   Wt 51.5 kg (113 lb 9.6 oz)   SpO2 98%   BMI 21.46 kg/m    BMI= Body mass index is 21.46 kg/m .  She is alert and appears well.  DEXA scan as noted in epic indicating increased risk of fracture with osteoporosis in the lumbar spine and hips.    ASSESSMENT:  Osteoporosis    PLAN:  Discussed with her options.  Distant history of breast cancer with a lumpectomy so estrogen therapy is out of the question.  Recommended Fosamax.  Discussed taking this and side effects.  We will follow her up in 2 years with a another DEXA scan.    Weight management plan noted, stable and monitoring    Bandar Will MD  [facility]            "

## 2020-09-09 ENCOUNTER — OFFICE VISIT (OUTPATIENT)
Dept: PALLIATIVE MEDICINE | Facility: CLINIC | Age: 80
End: 2020-09-09
Payer: COMMERCIAL

## 2020-09-09 VITALS
HEART RATE: 84 BPM | BODY MASS INDEX: 21.63 KG/M2 | TEMPERATURE: 97.9 F | DIASTOLIC BLOOD PRESSURE: 72 MMHG | WEIGHT: 114.5 LBS | SYSTOLIC BLOOD PRESSURE: 118 MMHG

## 2020-09-09 DIAGNOSIS — M54.2 CERVICALGIA: ICD-10-CM

## 2020-09-09 DIAGNOSIS — G89.29 CHRONIC SI JOINT PAIN: Primary | ICD-10-CM

## 2020-09-09 DIAGNOSIS — M53.3 CHRONIC SI JOINT PAIN: Primary | ICD-10-CM

## 2020-09-09 PROCEDURE — 99213 OFFICE O/P EST LOW 20 MIN: CPT | Performed by: PHYSICIAN ASSISTANT

## 2020-09-09 ASSESSMENT — PAIN SCALES - GENERAL: PAINLEVEL: MILD PAIN (3)

## 2020-09-09 NOTE — PATIENT INSTRUCTIONS
After Visit Instructions:     Thank you for coming to [facility] for your care. It is my goal to partner with you to help you reach your optimal state of health.     I am recommending multidisciplinary care at this time.  The focus of care will be to continue gradual rehabilitation and pain management with medication adjustments as needed.    Continue daily self-care, identifying contributing factors, and monitoring variations in pain level. Continue to integrate self-care into your life.      Procedures recommended: Referred for bilateral SI joint injections and cervical 6-7 epidural. To call and schedule your procedure, you can call: [phone], then hit option 2       Lizeth Munoz PA-C  [facility]    Contact information: [facility]  Clinic Number:  [phone]     Call with any questions about your care and for scheduling assistance.     Calls are returned Monday through Friday between 8 AM and 4:30 PM. We usually get back to you within 2 business days depending on the issue/request.    If we are prescribing your medications:    For opioid medication refills, call the clinic or send a Down message 7 days in advance.  Please include:    Name of requested medication    Name of the pharmacy.    For non-opioid medications, call your pharmacy directly to request a refill. Please allow 3-4 days to be processed.     Per [place] State Law:    All controlled substance prescriptions must be filled within 30 days of being written.      For those controlled substances allowing refills, pickup must occur within 30 days of last fill.      We believe regular attendance is key to your success in our program!      Any time you are unable to keep your appointment we ask that you call us at least 24 hours in advance to cancel.This will allow us to offer the appointment time to another patient.   Multiple missed appointments may lead to  dismissal from the clinic.

## 2020-09-09 NOTE — PROGRESS NOTES
"[facility]    CHIEF COMPLAINT:   Pain  -Degenerative Scoliosis    INTERVAL HISTORY:  Last seen on 10/16/2019.        Recommendations/plan at the last visit included:  1. Physical Therapy:  NO   2. Clinical Health Psychologist:  NO    3. Diagnostic Studies:  None at this time  4. Medication Management:  No changes   5. Further procedures recommended: right SI joint injection  6. Recommendations to PCP. See above        Follow up with this provider: Will depend on how the patient does after the SI joint injection.  If her pain is doing well she can follow-up with me as needed.  If this does not help her pain she should follow-up so we can come up with a new plan.    Since her last visit, Coby Damon reports:   Patient presented for trigger point injections versus SI joint injections/neck injection. The SI joint injections and neck injection were very effective. The last trigger point injections were not effective.       Pain Information:     Pain today 3/10      CURRENT RELEVANT PAIN MEDICATIONS:   Gabapentin 300mg at night  Ibuprofen 200mg-6-8/day lately (before was only taking 2 at night)       Patient is using the medication as prescribed:  YES  Is your medication helpful? YES   Medication side effects? no side effect    Previous Medications: (H--helped; HI--Helped initially; [facility]-- somewhat helpful, NH--No help; W--worse; SE--side effects)   Opiates: none  NSAIDS: Ibuprofen H  Muscle Relaxants: was on one years ago \"hated it\" \"felt goofy\"  Anti-migraine mediations: none  Anti-depressants: none  Sleep aids: none  Anxiolytics: was on Valium years ago \"felt goofy\"  Neuropathics:  Gabapentin NH            Topicals: none  Other medications not covered above: Tylenol NH       Past Pain Treatments:  Pain Clinic:   No   PT: Yes, has one more session next week (has been helpful)  Psychologist: No  Relaxation techniques/biofeedback: No  Chiropractor: Yes, was not helpful  Acupuncture: " No  Pharmacotherapy:           Opioids: No             Non-opioids:    Yes   TENs Unit: No   Injections:   09/23/2015 C7-T1 KATHY  10/24/2019 Bilateral SI joint injections  11/08/2019 C6-7 KATHY  Self-care:   Yes, ice packs during the day, heating pad at night, hot showers  Surgeries related to pain: No     [place] Board of Pharmacy Data Base Reviewed:    No;  (if yes, As expected, no concern for misuse/abuse of controlled medications based on this report).      Medications:  Current Outpatient Medications   Medication Sig Dispense Refill     acetaminophen (TYLENOL) 325 MG tablet Take 325-650 mg by mouth every 6 hours as needed       alendronate (FOSAMAX) 70 MG tablet Take 1 tablet (70 mg) by mouth every 7 days 12 tablet 3     Cholecalciferol (VITAMIN D) 2000 UNITS tablet Take 2,000 Units by mouth daily       gabapentin (NEURONTIN) 300 MG capsule TAKE 1 CAPSULE BY MOUTH TWICE DAILY. 180 capsule 1     IBUPROFEN PO Take 400 mg by mouth every 6 hours as needed for moderate pain       lidocaine (LIDODERM) 5 % patch Apply 1-3 patches to area of pain. On for 12 hours off for 12 hours. Okay to cut for better fit 90 patch 0     lisinopril (PRINIVIL/ZESTRIL) 10 MG tablet Take 1 tablet (10 mg) by mouth daily 90 tablet 3     Multiple Vitamins-Minerals (MULTIVITAMIN OR)        omega 3 1000 MG CAPS Take 1 g by mouth daily 90 capsule      vitamin B complex with vitamin C (VITAMIN  B COMPLEX) tablet Take 1 tablet by mouth daily           PHYSICAL EXAM:     Vitals: /72   Pulse 84   Temp 97.9  F (36.6  C) (Temporal)   Wt 51.9 kg (114 lb 8 oz)   Breastfeeding No   BMI 21.63 kg/m        Constitutional: healthy, alert and no distress  HEENT: Head atraumatic, normocephalic. Eyes without conjunctival injection or jaundice. Neck supple. No obvious neck masses.  Skin: No rash, lesions, or petechiae of exposed skin.   Psychiatric/mental status: Alert, without lethargy or stupor. Appropriate affect. Mood normal.       DIAGNOSTIC  TESTS:  Imaging Studies:   No new imaging to review    Assessment:  Coby Damon is a 80 year old female who presents today for follow up regarding her:    1. Chronic  SI joint pain  2. cervicalgia    Patient presented today for trigger point injections. After further discussion with the patient she would like to repeat the cervical epidural and bilateral SI joint injections as these were very effective for her previously. Orders placed for these injections.     Plan:    Diagnosis reviewed, treatment option addressed, and risk/benifits discussed.  Self-care instructions given.  I am recommending a multidisciplinary treatment plan to help this patient better manage pain.      1. Physical Therapy:  NO   2. Clinical Health Psychologist:  NO    3. Diagnostic Studies:  None at this time  4. Medication Management:  No changes   5. Further procedures recommended: LLOYD and Bilateral SI joint injections  6. Recommendations to PCP. See above      Follow up with this provider: as needed    Total time spent face to face was 6 minutes and more than 50% of face to face time was spent in counseling and/or coordination of care regarding the diagnosis and recommendations above.      Lizeth Munoz PA-C   [facility]

## 2020-09-10 ENCOUNTER — TELEPHONE (OUTPATIENT)
Facility: CLINIC | Age: 80
End: 2020-09-10

## 2020-09-10 DIAGNOSIS — Z11.59 ENCOUNTER FOR SCREENING FOR OTHER VIRAL DISEASES: Primary | ICD-10-CM

## 2020-09-10 NOTE — TELEPHONE ENCOUNTER
Pt scheduled for [facility]  Date: 9/24/20  Time: 0800  Dr. Webster    Pt scheduled for LLOYD  Date: 10/2/20  Time: 0800   Dr. Webster    Instructed pt to have H&P and  for procedure.  Patient informed of COVID testing process.

## 2020-09-20 DIAGNOSIS — Z11.59 ENCOUNTER FOR SCREENING FOR OTHER VIRAL DISEASES: ICD-10-CM

## 2020-09-20 PROCEDURE — U0003 INFECTIOUS AGENT DETECTION BY NUCLEIC ACID (DNA OR RNA); SEVERE ACUTE RESPIRATORY SYNDROME CORONAVIRUS 2 (SARS-COV-2) (CORONAVIRUS DISEASE [COVID-19]), AMPLIFIED PROBE TECHNIQUE, MAKING USE OF HIGH THROUGHPUT TECHNOLOGIES AS DESCRIBED BY CMS-2020-01-R: HCPCS | Performed by: ANESTHESIOLOGY

## 2020-09-21 ENCOUNTER — OFFICE VISIT (OUTPATIENT)
Dept: FAMILY MEDICINE | Facility: CLINIC | Age: 80
End: 2020-09-21
Payer: COMMERCIAL

## 2020-09-21 VITALS
WEIGHT: 113 LBS | OXYGEN SATURATION: 99 % | TEMPERATURE: 97.8 F | DIASTOLIC BLOOD PRESSURE: 66 MMHG | HEART RATE: 91 BPM | SYSTOLIC BLOOD PRESSURE: 118 MMHG | BODY MASS INDEX: 21.35 KG/M2

## 2020-09-21 DIAGNOSIS — Z01.818 PREOP GENERAL PHYSICAL EXAM: Primary | ICD-10-CM

## 2020-09-21 DIAGNOSIS — M54.2 CERVICALGIA: ICD-10-CM

## 2020-09-21 DIAGNOSIS — M54.40 LOW BACK PAIN WITH SCIATICA, SCIATICA LATERALITY UNSPECIFIED, UNSPECIFIED BACK PAIN LATERALITY, UNSPECIFIED CHRONICITY: ICD-10-CM

## 2020-09-21 DIAGNOSIS — Z23 NEED FOR PROPHYLACTIC VACCINATION AND INOCULATION AGAINST INFLUENZA: ICD-10-CM

## 2020-09-21 LAB
LABORATORY COMMENT REPORT: NORMAL
SARS-COV-2 RNA SPEC QL NAA+PROBE: NEGATIVE
SARS-COV-2 RNA SPEC QL NAA+PROBE: NORMAL
SPECIMEN SOURCE: NORMAL
SPECIMEN SOURCE: NORMAL

## 2020-09-21 PROCEDURE — 99214 OFFICE O/P EST MOD 30 MIN: CPT | Performed by: FAMILY MEDICINE

## 2020-09-21 PROCEDURE — 90662 IIV NO PRSV INCREASED AG IM: CPT | Performed by: FAMILY MEDICINE

## 2020-09-21 PROCEDURE — G0008 ADMIN INFLUENZA VIRUS VAC: HCPCS | Performed by: FAMILY MEDICINE

## 2020-09-21 NOTE — PATIENT INSTRUCTIONS

## 2020-09-21 NOTE — PROGRESS NOTES
[address] [address]  Phone: [phone]  Fax: [phone]  Primary Provider: Bandar Will  Pre-op Performing Provider: BANDAR WILL    PREOPERATIVE EVALUATION:  Today's date: 9/21/2020    Coby Damon is a 80 year old female who presents for a preoperative evaluation.    Surgical Information:  Surgery Details 9/21/2020   Surgery/Procedure: Spinal injection   Surgery Location: [facility]   Surgeon: Eleanor   Surgery Date: 09/24 and 10/02   Time of Surgery: 8:00am   Where patient plans to recover: At home alone     Fax number for surgical facility: Note does not need to be faxed, will be available electronically in Epic.  Type of Anesthesia Anticipated: Epidural    Subjective      Having KATHY done of both the cervical spine and sacroiliac joints.    HPI related to upcoming procedure:   Preop Questions 9/21/2020   1. Have you ever had a heart attack or stroke? No   2. Have you ever had surgery on your heart or blood vessels, such as a stent placement, a coronary artery bypass, or surgery on an artery in your head, neck, heart, or legs? No   3. Do you have chest pain with activity? No   4. Do you have a history of  heart failure? No   5. Do you currently have a cold, bronchitis or symptoms of other infection? No   6. Do you have a cough, shortness of breath, or wheezing? No   7. Do you or anyone in your family have previous history of blood clots? No   8. Do you or does anyone in your family have a serious bleeding problem such as prolonged bleeding following surgeries or cuts? No   9. Have you ever had problems with anemia or been told to take iron pills? No   10. Have you had any abnormal blood loss such as black, tarry or bloody stools, or abnormal vaginal bleeding? No   11. Have you ever had a blood transfusion? YES -    11a. Have you ever had a transfusion reaction? No   Are you willing to have a blood transfusion if it is  medically needed before, during, or after your surgery? Yes   13. Have you or any of your relatives ever had problems with anesthesia? No   14. Do you have sleep apnea, excessive snoring or daytime drowsiness? No   15. Do you have any artifical heart valves or other implanted medical devices like a pacemaker, defibrillator, or continuous glucose monitor? No   16. Do you have artificial joints? No   17. Are you allergic to latex? No   18. Is there any chance that you may be pregnant? No         Review of Systems  Constitutional, neuro, ENT, endocrine, pulmonary, cardiac, gastrointestinal, genitourinary, musculoskeletal, integument and psychiatric systems are negative, except as otherwise noted.    Patient Active Problem List    Diagnosis Date Noted     Scoliosis of thoracolumbar spine, unspecified scoliosis type 09/30/2019     Priority: Medium     Advance Care Planning 09/22/2017     Priority: Medium     Low back pain, unspecified back pain laterality, unspecified chronicity, with sciatica presence unspecified 12/14/2016     Priority: Medium     Cervicalgia 08/28/2015     Priority: Medium     Age-related osteoporosis without current pathological fracture 12/10/2014     Priority: Medium     Hyperlipidemia LDL goal <130 12/10/2014     Priority: Medium     CARDIOVASCULAR SCREENING; LDL GOAL LESS THAN 130 09/18/2013     Priority: Medium     Hypertension goal BP (blood pressure) < 140/90 09/18/2013     Priority: Medium      Past Medical History:   Diagnosis Date     Breast cancer (H) 2004    lumpectomy and 33 lymph nodes removed.     Shingles      Past Surgical History:   Procedure Laterality Date     COLONOSCOPY N/A 12/12/2014    Procedure: COLONOSCOPY;  Surgeon: Bandar Guidry MD;  Location:  GI     HYSTERECTOMY, PAP NO LONGER INDICATED       INJECT EPIDURAL CERVICAL N/A 9/23/2015    Procedure: INJECT EPIDURAL CERVICAL;  Surgeon: Sawyer Webster MD;  [facility]     INJECT EPIDURAL CERVICAL Right 11/8/2019     Procedure: Cervical 6-7 Spine Injection;  Surgeon: Sawyer Webster MD;  [facility]     INJECT JOINT SACROILIAC Bilateral 10/24/2019    Procedure: Bilateral Sacroiliac Joint Injections;  Surgeon: Sawyer Webster MD;  [facility]     PHACOEMULSIFICATION WITH STANDARD INTRAOCULAR LENS IMPLANT Left 11/15/2018    Procedure: PHACOEMULSIFICATION WITH STANDARD INTRAOCULAR LENS IMPLANT LEFT EYE;  Surgeon: Rian Ford MD;  [facility]     PHACOEMULSIFICATION WITH STANDARD INTRAOCULAR LENS IMPLANT Right 11/29/2018    Procedure: PHACOEMULSIFICATION WITH STANDARD INTRAOCULAR LENS IMPLANT RIGHT EYE;  Surgeon: Rian Ford MD;  Location[facility]     Current Outpatient Medications   Medication Sig Dispense Refill     acetaminophen (TYLENOL) 325 MG tablet Take 325-650 mg by mouth every 6 hours as needed       alendronate (FOSAMAX) 70 MG tablet Take 1 tablet (70 mg) by mouth every 7 days 12 tablet 3     Cholecalciferol (VITAMIN D) 2000 UNITS tablet Take 2,000 Units by mouth daily       gabapentin (NEURONTIN) 300 MG capsule TAKE 1 CAPSULE BY MOUTH TWICE DAILY. 180 capsule 1     IBUPROFEN PO Take 400 mg by mouth every 6 hours as needed for moderate pain       lidocaine (LIDODERM) 5 % patch Apply 1-3 patches to area of pain. On for 12 hours off for 12 hours. Okay to cut for better fit 90 patch 0     lisinopril (PRINIVIL/ZESTRIL) 10 MG tablet Take 1 tablet (10 mg) by mouth daily 90 tablet 3     Magnesium Oxide 250 MG TABS Take 1 tablet by mouth daily Patient takes only every other day.       Multiple Vitamins-Minerals (MULTIVITAMIN OR)        omega 3 1000 MG CAPS Take 1 g by mouth daily 90 capsule      vitamin B complex with vitamin C (VITAMIN  B COMPLEX) tablet Take 1 tablet by mouth daily         No Known Allergies     Social History     Tobacco Use     Smoking status: Never Smoker     Smokeless tobacco: Never Used   Substance Use Topics     Alcohol use: Yes     Comment:  1 glass of wine with dinner       No family history on file.  History   Drug Use No            Objective   /66   Pulse 91   Temp 97.8  F (36.6  C) (Temporal)   Wt 51.3 kg (113 lb)   SpO2 99%   BMI 21.35 kg/m    Physical Exam    GENERAL APPEARANCE: healthy, alert and no distress     EYES: EOMI, PERRL     HENT: ear canals and TM's normal and nose and mouth without ulcers or lesions     NECK: no adenopathy, no asymmetry, masses, or scars and thyroid normal to palpation     RESP: lungs clear to auscultation - no rales, rhonchi or wheezes     CV: regular rates and rhythm, normal S1 S2, no S3 or S4 and no murmur, click or rub     ABDOMEN:  soft, nontender, no HSM or masses and bowel sounds normal     MS: extremities normal- no gross deformities noted, no evidence of inflammation in joints, FROM in all extremities.     SKIN: no suspicious lesions or rashes     NEURO: Normal strength and tone, sensory exam grossly normal, mentation intact and speech normal     PSYCH: mentation appears normal. and affect normal/bright     LYMPHATICS: No cervical adenopathy    Recent Labs   Lab Test 12/13/19  1119 11/09/18  1004    142   POTASSIUM 4.1 4.1   CR 0.78 0.77        PRE-OP Diagnostics:  No labs were ordered during this visit.  No EKG required for low risk surgery (cataract, skin procedure, breast biopsy, etc).         Assessment & Plan   The proposed surgical procedure is considered LOW risk.    REVISED CARDIAC RISK INDEX  The patient has the following serious cardiovascular risks for perioperative complications:  No serious cardiac risks = 0 points    INTERPRETATION: 0 points: Class I (very low risk - 0.4% complication rate)       1. Preop general physical exam      2. Cervicalgia  KATHY to be done on October 2, 2020    3. Low back pain with sciatica, sciatica laterality unspecified, unspecified back pain laterality, unspecified chronicity  KATHY to be done on September 24, 2020      The patient has the following additional risks and  recommendations for perioperative complications:     - No identified additional risk factors other than previously addressed     MEDICATION INSTRUCTIONS:  Patient is to take all scheduled medications on the day of surgery    RECOMMENDATION:  APPROVAL GIVEN to proceed with proposed procedure, without further diagnostic evaluation.    No follow-ups on file.    Signed Electronically by: Bandar Will MD    Copy of this evaluation report is provided to requesting physician.    [facility] Preop Guidelines    Revised Cardiac Risk Index

## 2020-09-24 ENCOUNTER — HOSPITAL ENCOUNTER (OUTPATIENT)
Facility: CLINIC | Age: 80
Discharge: HOME | End: 2020-09-24
Attending: ANESTHESIOLOGY | Admitting: ANESTHESIOLOGY
Payer: COMMERCIAL

## 2020-09-24 ENCOUNTER — ANESTHESIA EVENT (OUTPATIENT)
Dept: SURGERY | Facility: CLINIC | Age: 80
End: 2020-09-24
Payer: COMMERCIAL

## 2020-09-24 ENCOUNTER — HOSPITAL ENCOUNTER (OUTPATIENT)
Dept: GENERAL RADIOLOGY | Facility: CLINIC | Age: 80
End: 2020-09-24
Attending: ANESTHESIOLOGY | Admitting: ANESTHESIOLOGY
Payer: COMMERCIAL

## 2020-09-24 ENCOUNTER — ANESTHESIA (OUTPATIENT)
Dept: SURGERY | Facility: CLINIC | Age: 80
End: 2020-09-24
Payer: COMMERCIAL

## 2020-09-24 VITALS
DIASTOLIC BLOOD PRESSURE: 71 MMHG | RESPIRATION RATE: 16 BRPM | HEART RATE: 62 BPM | SYSTOLIC BLOOD PRESSURE: 123 MMHG | OXYGEN SATURATION: 100 % | TEMPERATURE: 97.9 F

## 2020-09-24 DIAGNOSIS — M53.3 CHRONIC SI JOINT PAIN: ICD-10-CM

## 2020-09-24 DIAGNOSIS — G89.29 CHRONIC SI JOINT PAIN: ICD-10-CM

## 2020-09-24 PROCEDURE — 27096 INJECT SACROILIAC JOINT: CPT | Performed by: ANESTHESIOLOGY

## 2020-09-24 PROCEDURE — 37000008 ZZH ANESTHESIA TECHNICAL FEE, 1ST 30 MIN: Performed by: ANESTHESIOLOGY

## 2020-09-24 PROCEDURE — 25000128 H RX IP 250 OP 636: Performed by: ANESTHESIOLOGY

## 2020-09-24 PROCEDURE — 25000128 H RX IP 250 OP 636: Performed by: NURSE ANESTHETIST, CERTIFIED REGISTERED

## 2020-09-24 PROCEDURE — 25000125 ZZHC RX 250: Performed by: NURSE ANESTHETIST, CERTIFIED REGISTERED

## 2020-09-24 PROCEDURE — 40000277 XR SURGERY CARM FLUORO LESS THAN 5 MIN W STILLS: Mod: TC

## 2020-09-24 PROCEDURE — 27096 INJECT SACROILIAC JOINT: CPT | Mod: LT | Performed by: ANESTHESIOLOGY

## 2020-09-24 PROCEDURE — 25000125 ZZHC RX 250: Performed by: ANESTHESIOLOGY

## 2020-09-24 PROCEDURE — 20550 NJX 1 TENDON SHEATH/LIGAMENT: CPT | Mod: 59 | Performed by: ANESTHESIOLOGY

## 2020-09-24 RX ORDER — LIDOCAINE 40 MG/G
CREAM TOPICAL
Status: DISCONTINUED | OUTPATIENT
Start: 2020-09-24 | End: 2020-09-24 | Stop reason: HOSPADM

## 2020-09-24 RX ORDER — LIDOCAINE HYDROCHLORIDE 20 MG/ML
INJECTION, SOLUTION INFILTRATION; PERINEURAL PRN
Status: DISCONTINUED | OUTPATIENT
Start: 2020-09-24 | End: 2020-09-24

## 2020-09-24 RX ORDER — PROPOFOL 10 MG/ML
INJECTION, EMULSION INTRAVENOUS PRN
Status: DISCONTINUED | OUTPATIENT
Start: 2020-09-24 | End: 2020-09-24

## 2020-09-24 RX ORDER — BUPIVACAINE HYDROCHLORIDE 5 MG/ML
INJECTION, SOLUTION PERINEURAL PRN
Status: DISCONTINUED | OUTPATIENT
Start: 2020-09-24 | End: 2020-09-24 | Stop reason: HOSPADM

## 2020-09-24 RX ORDER — IOPAMIDOL 612 MG/ML
INJECTION, SOLUTION INTRATHECAL PRN
Status: DISCONTINUED | OUTPATIENT
Start: 2020-09-24 | End: 2020-09-24 | Stop reason: HOSPADM

## 2020-09-24 RX ORDER — TRIAMCINOLONE ACETONIDE 40 MG/ML
INJECTION, SUSPENSION INTRA-ARTICULAR; INTRAMUSCULAR PRN
Status: DISCONTINUED | OUTPATIENT
Start: 2020-09-24 | End: 2020-09-24 | Stop reason: HOSPADM

## 2020-09-24 RX ADMIN — PROPOFOL 20 MG: 10 INJECTION, EMULSION INTRAVENOUS at 08:22

## 2020-09-24 RX ADMIN — LIDOCAINE HYDROCHLORIDE 0.1 ML: 10 INJECTION, SOLUTION EPIDURAL; INFILTRATION; INTRACAUDAL; PERINEURAL at 07:51

## 2020-09-24 RX ADMIN — PROPOFOL 50 MG: 10 INJECTION, EMULSION INTRAVENOUS at 08:19

## 2020-09-24 RX ADMIN — LIDOCAINE HYDROCHLORIDE 60 MG: 20 INJECTION, SOLUTION INFILTRATION; PERINEURAL at 08:19

## 2020-09-24 RX ADMIN — PROPOFOL 30 MG: 10 INJECTION, EMULSION INTRAVENOUS at 08:25

## 2020-09-24 RX ADMIN — PROPOFOL 50 MG: 10 INJECTION, EMULSION INTRAVENOUS at 08:29

## 2020-09-24 SDOH — HEALTH STABILITY: MENTAL HEALTH: CURRENT SMOKER: 0

## 2020-09-24 ASSESSMENT — LIFESTYLE VARIABLES: TOBACCO_USE: 0

## 2020-09-24 NOTE — ANESTHESIA CARE TRANSFER NOTE
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    Anesthesia Type:   MAC     Note:  Airway :Nasal Cannula  Patient transferred to:Phase II  Handoff Report: Identifed the Patient, Identified the Reponsible Provider, Reviewed the pertinent medical history, Discussed the surgical course, Reviewed Intra-OP anesthesia mangement and issues during anesthesia, Set expectations for post-procedure period and Allowed opportunity for questions and acknowledgement of understanding      Vitals: (Last set prior to Anesthesia Care Transfer)    CRNA VITALS  9/24/2020 0804 - 9/24/2020 0840      9/24/2020             Pulse:  75    SpO2:  94 %    Resp Rate (observed):  16                Electronically Signed By: SHONDA Guerin CRNA  September 24, 2020  8:40 AM

## 2020-09-24 NOTE — OP NOTE
CHIEF COMPLAINT:    1.SI joint dysfunction (Sacroilitis) and pain (724.6)   2 Sacral enthesopathy (720.1)    PROCEDURE:   Fluoroscopically-guided injection of the Bilateral  sacroiliac joints with Bilateral andre Sacroiliac joint ligaments infiltration over the sacrum.    PROCEDURE DETAILS: After written informed consent was obtained from the patient, the patient was escorted to the procedure room.  The patient was placed in the prone position.   A time out was conducted to verify patient identity, procedure to be performed, side, site, allergies and any special requirements.  The skin over the lumbosacral region was prepped and draped in normal sterile fashion. Fluoroscopy was used to identify the posteroinferior region of the sacroiliac joint.  The skin was anesthetized with 2 mL of 1% lidocaine with bicarbonate buffer.  Using fluoroscopic guidance, a 22 gauge, 3.5  Quincke spinal needle was advanced into the joint from an inferior approach.  After negative aspiration, 0.5 ml of Omnipaque contrast was injected showing intra-articular spread of contrast without evidence of intravascular spread.  2.0 mL of solution consisting of 40 mg of Depo-Medrol and 1.5 cc of 0.5% marcaine was injected slowly into the joint.   A second injection at the sacroiliac joint ligaments was then performed.  The middle third of the SI Joint was identified with fluoroscopy. After advancing a 22 gauge, 3.5  Quincke spinal needle to these ligaments with intermittent fluoroscopic guidance,  3 mL of solution consisting of  20 mg of DepoMedrol and 2.75 mL of 0.5 Marcaine was injected periligamentous and intramuscular over the sacroiliac joint ligaments.    This was performed bilaterally.  The patient was monitored with blood pressure and pulse oximetry machines with the assistance of an RN throughout the procedure.  The patient was alert and responsive to questions throughout the procedure.   The patient tolerated the procedure well and was  observed in the post-procedural area.  The patient was dismissed without apparent complications.     DIAGNOSIS:  1.  Bilateral sacroilitis  2.  Bilateral sacral enthesopathy  PLAN:  1. Performed Bilateral   Sacroiliac joint injections.  2. Bilateral SI ligament injections over the sacrum       Sawyer Webster MD  Diplomate of the American Board of Anesthesiology, Pain Medicine

## 2020-09-24 NOTE — DISCHARGE INSTRUCTIONS
Home Care Instructions                Procedure: Epidural injection or joint injection    Activity:    Rest today    Do not work today    Resume normal activity tomorrow    Pain:    You may experience soreness at the injection site for 1 to 3 days.    You may use an ice pack for 20 minutes every 2 hours for the first 24 hours    You may use a heating pad after the first 24 hours    You may use Tylenol  (acetaminophen) every 4 hours or other pain medicines as directed by your physician    Safety  Sedation medicine, if given may remain active for many hours.    It is important for the next 24 hours that you do not:    Drive a car    Operate machines or power tools    Consume alcohol, including beer    Sign any important papers or legal documents    You may experience numbness radiating into your legs or arms, (depending on the procedure location)  This numbness may last several hours.  Until the numb sensation returns to normal please use caution in walking, climbing stairs, stepping out of your vehicle, etc.    Common side effects of steroids:  Not everyone will experience corticosteroid side effects. If side effects are experienced they will gradually subside in the 7-10 day period following an injection.    Most common side effects include:    Flushed face and/or chest    Feeling of warmth, particularly in face but could be overall feeling of warmth    Increased blood sugar in diabetic patients    Menstrual irregularities may occur.  If taking hormone based birth control an alternate method of birth control is recommended    Sleep disturbances and/or mood swings are possible    Leg cramps    Please contact us if you have:  Severe pain   Fever more than 101.5 degrees Fahrenheit  Signs of infection (redness, swelling or drainage)      If you have questions during normal business hours (8am-5pm Monday-Friday) contact the [facility] at [phone]. If you need help after hours, we recommend that you go to a  hospital emergency room or dial 911.

## 2020-09-24 NOTE — ANESTHESIA PREPROCEDURE EVALUATION
Anesthesia Pre-Procedure Evaluation    Patient: Coby Damon   MRN: [id number] : 1940          Preoperative Diagnosis: Chronic right SI joint pain [M53.3, G89.29]    Procedure(s):  Sacroiliac Therapeutic Injection Right    Past Medical History:   Diagnosis Date     Breast cancer (H) 2004    lumpectomy and 33 lymph nodes removed.     Shingles      Past Surgical History:   Procedure Laterality Date     COLONOSCOPY N/A 2014    Procedure: COLONOSCOPY;  Surgeon: Bandar Guidry MD;  [facility]     HYSTERECTOMY, PAP NO LONGER INDICATED       INJECT EPIDURAL CERVICAL N/A 2015    Procedure: INJECT EPIDURAL CERVICAL;  Surgeon: Sawyer Webster MD;  [facility]     INJECT EPIDURAL CERVICAL Right 2019    Procedure: Cervical 6-7 Spine Injection;  Surgeon: Sawyer Webster MD;  [facility]     INJECT JOINT SACROILIAC Bilateral 10/24/2019    Procedure: Bilateral Sacroiliac Joint Injections;  Surgeon: Sawyer Webster MD;  [facility]     PHACOEMULSIFICATION WITH STANDARD INTRAOCULAR LENS IMPLANT Left 11/15/2018    Procedure: PHACOEMULSIFICATION WITH STANDARD INTRAOCULAR LENS IMPLANT LEFT EYE;  Surgeon: Rian Ford MD;  [facility]     PHACOEMULSIFICATION WITH STANDARD INTRAOCULAR LENS IMPLANT Right 2018    Procedure: PHACOEMULSIFICATION WITH STANDARD INTRAOCULAR LENS IMPLANT RIGHT EYE;  Surgeon: Rian Ford MD;  [facility]       Anesthesia Evaluation     . Pt has had prior anesthetic. Type: General and MAC    No history of anesthetic complications          ROS/MED HX    ENT/Pulmonary:  - neg pulmonary ROS    (-) tobacco use   Neurologic:  - neg neurologic ROS     Cardiovascular:     (+) Dyslipidemia, hypertension----. : . . . :. . Previous cardiac testing date:results:date: results:ECG reviewed date: results:SR w/ first degree AV block date: results:          METS/Exercise Tolerance:     Hematologic:  - neg hematologic  ROS      "  Musculoskeletal:   (+) arthritis,  -       GI/Hepatic:  - neg GI/hepatic ROS       Renal/Genitourinary:  - ROS Renal section negative   (+) Pt has no history of transplant,       Endo:  - neg endo ROS       Psychiatric:  - neg psychiatric ROS       Infectious Disease:  - neg infectious disease ROS       Malignancy:   (+) Malignancy History of Breast  Breast CA Remission status post.         Other:    (+) No chance of pregnancy C-spine cleared: N/A, no H/O Chronic Pain,no other significant disability   - neg other ROS                        Physical Exam  Normal systems: cardiovascular, pulmonary and dental    Airway   Mallampati: II  TM distance: >3 FB  Neck ROM: full    Dental     Cardiovascular   Rhythm and rate: regular and normal      Pulmonary    breath sounds clear to auscultation            Lab Results   Component Value Date    WBC 4.9 06/04/2014    HGB 13.6 06/04/2014    HCT 41.1 06/04/2014     06/04/2014    SED 11 06/04/2014     12/13/2019    POTASSIUM 4.1 12/13/2019    CHLORIDE 105 12/13/2019    CO2 29 12/13/2019    BUN 12 12/13/2019    CR 0.78 12/13/2019    GLC 95 12/13/2019    ASHLEY 9.3 12/13/2019    ALBUMIN 4.0 12/13/2019    PROTTOTAL 7.7 12/13/2019    ALT 20 12/13/2019    AST 19 12/13/2019    ALKPHOS 96 12/13/2019    BILITOTAL 0.3 12/13/2019    LIPASE 89 06/04/2014       Preop Vitals  BP Readings from Last 3 Encounters:   09/21/20 118/66   09/09/20 118/72   01/08/20 116/64    Pulse Readings from Last 3 Encounters:   09/21/20 91   09/09/20 84   01/08/20 83      Resp Readings from Last 3 Encounters:   01/08/20 16   12/13/19 22   11/08/19 12    SpO2 Readings from Last 3 Encounters:   09/21/20 99%   01/08/20 98%   12/13/19 96%      Temp Readings from Last 1 Encounters:   09/21/20 97.8  F (36.6  C) (Temporal)    Ht Readings from Last 1 Encounters:   01/08/20 1.549 m (5' 1\")      Wt Readings from Last 1 Encounters:   09/21/20 51.3 kg (113 lb)    Estimated body mass index is 21.35 kg/m  as " "calculated from the following:    Height as of 1/8/20: 1.549 m (5' 1\").    Weight as of 9/21/20: 51.3 kg (113 lb).       Anesthesia Plan      History & Physical Review  History and physical reviewed and following examination; no interval change.    ASA Status:  2 .    NPO Status:  > 6 hours    Plan for MAC with Other induction. Reason for MAC:  Deep or markedly invasive procedure (G8)      patient smoked on day of surgeryThe patient is not a current smoker      Postoperative Care  Postoperative pain management:  Oral pain medications.      Consents  Anesthetic plan, risks, benefits and alternatives discussed with:  Patient.  Use of blood products discussed: No .   .                   SHONDA Guerin CRNA  "

## 2020-09-24 NOTE — ANESTHESIA POSTPROCEDURE EVALUATION
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    Anesthesia Type:  MAC    Note:  Anesthesia Post Evaluation    Patient location during evaluation: Phase 2  Patient participation: Able to fully participate in evaluation  Level of consciousness: awake  Pain management: adequate  Airway patency: patent  Cardiovascular status: acceptable and hemodynamically stable  Respiratory status: acceptable, room air and nonlabored ventilation  Hydration status: stable  PONV: none     Anesthetic complications: None    Comments: Patient was happy with the anesthesia care received and no anesthesia related complications were noted.  I will follow up with the patient again if it is needed.        Last vitals:  Vitals:    09/24/20 0730 09/24/20 0835 09/24/20 0840   BP: 119/65 112/74 129/88   Pulse: 69  77   Resp: 16     Temp: 97.9  F (36.6  C)     SpO2: 99%  100%         Electronically Signed By: SHONDA Guerin CRNA  September 24, 2020  8:47 AM

## 2020-09-28 DIAGNOSIS — Z11.59 ENCOUNTER FOR SCREENING FOR OTHER VIRAL DISEASES: ICD-10-CM

## 2020-09-28 PROCEDURE — U0003 INFECTIOUS AGENT DETECTION BY NUCLEIC ACID (DNA OR RNA); SEVERE ACUTE RESPIRATORY SYNDROME CORONAVIRUS 2 (SARS-COV-2) (CORONAVIRUS DISEASE [COVID-19]), AMPLIFIED PROBE TECHNIQUE, MAKING USE OF HIGH THROUGHPUT TECHNOLOGIES AS DESCRIBED BY CMS-2020-01-R: HCPCS | Performed by: ANESTHESIOLOGY

## 2020-09-29 LAB
SARS-COV-2 RNA SPEC QL NAA+PROBE: NOT DETECTED
SPECIMEN SOURCE: NORMAL

## 2020-10-02 ENCOUNTER — ANESTHESIA EVENT (OUTPATIENT)
Dept: SURGERY | Facility: CLINIC | Age: 80
End: 2020-10-02
Payer: COMMERCIAL

## 2020-10-02 ENCOUNTER — ANESTHESIA (OUTPATIENT)
Dept: SURGERY | Facility: CLINIC | Age: 80
End: 2020-10-02
Payer: COMMERCIAL

## 2020-10-02 ENCOUNTER — HOSPITAL ENCOUNTER (OUTPATIENT)
Facility: CLINIC | Age: 80
Discharge: HOME | End: 2020-10-02
Attending: ANESTHESIOLOGY | Admitting: ANESTHESIOLOGY
Payer: COMMERCIAL

## 2020-10-02 ENCOUNTER — HOSPITAL ENCOUNTER (OUTPATIENT)
Dept: GENERAL RADIOLOGY | Facility: CLINIC | Age: 80
End: 2020-10-02
Attending: ANESTHESIOLOGY | Admitting: ANESTHESIOLOGY
Payer: COMMERCIAL

## 2020-10-02 VITALS
TEMPERATURE: 97.5 F | OXYGEN SATURATION: 98 % | RESPIRATION RATE: 16 BRPM | HEIGHT: 61 IN | DIASTOLIC BLOOD PRESSURE: 75 MMHG | BODY MASS INDEX: 21.34 KG/M2 | WEIGHT: 113 LBS | SYSTOLIC BLOOD PRESSURE: 136 MMHG

## 2020-10-02 DIAGNOSIS — M54.2 CERVICALGIA: ICD-10-CM

## 2020-10-02 PROCEDURE — 250N000011 HC RX IP 250 OP 636: Performed by: NURSE ANESTHETIST, CERTIFIED REGISTERED

## 2020-10-02 PROCEDURE — 250N000009 HC RX 250: Performed by: NURSE ANESTHETIST, CERTIFIED REGISTERED

## 2020-10-02 PROCEDURE — 999N000179 XR SURGERY CARM FLUORO LESS THAN 5 MIN W STILLS: Mod: TC

## 2020-10-02 PROCEDURE — 250N000011 HC RX IP 250 OP 636: Performed by: ANESTHESIOLOGY

## 2020-10-02 PROCEDURE — 62321 NJX INTERLAMINAR CRV/THRC: CPT

## 2020-10-02 PROCEDURE — 62321 NJX INTERLAMINAR CRV/THRC: CPT | Performed by: ANESTHESIOLOGY

## 2020-10-02 PROCEDURE — 370N000001 HC ANESTHESIA TECHNICAL FEE, 1ST 30 MIN: Performed by: ANESTHESIOLOGY

## 2020-10-02 RX ORDER — PROPOFOL 10 MG/ML
INJECTION, EMULSION INTRAVENOUS PRN
Status: DISCONTINUED | OUTPATIENT
Start: 2020-10-02 | End: 2020-10-02

## 2020-10-02 RX ORDER — IOPAMIDOL 612 MG/ML
INJECTION, SOLUTION INTRATHECAL PRN
Status: DISCONTINUED | OUTPATIENT
Start: 2020-10-02 | End: 2020-10-02 | Stop reason: HOSPADM

## 2020-10-02 RX ORDER — TRIAMCINOLONE ACETONIDE 40 MG/ML
INJECTION, SUSPENSION INTRA-ARTICULAR; INTRAMUSCULAR PRN
Status: DISCONTINUED | OUTPATIENT
Start: 2020-10-02 | End: 2020-10-02 | Stop reason: HOSPADM

## 2020-10-02 RX ORDER — LIDOCAINE 40 MG/G
CREAM TOPICAL
Status: DISCONTINUED | OUTPATIENT
Start: 2020-10-02 | End: 2020-10-02 | Stop reason: HOSPADM

## 2020-10-02 RX ORDER — LIDOCAINE HYDROCHLORIDE 20 MG/ML
INJECTION, SOLUTION INFILTRATION; PERINEURAL PRN
Status: DISCONTINUED | OUTPATIENT
Start: 2020-10-02 | End: 2020-10-02

## 2020-10-02 RX ADMIN — PROPOFOL 30 MG: 10 INJECTION, EMULSION INTRAVENOUS at 08:04

## 2020-10-02 RX ADMIN — PROPOFOL 50 MG: 10 INJECTION, EMULSION INTRAVENOUS at 08:02

## 2020-10-02 RX ADMIN — LIDOCAINE HYDROCHLORIDE 1 ML: 10 INJECTION, SOLUTION EPIDURAL; INFILTRATION; INTRACAUDAL; PERINEURAL at 07:37

## 2020-10-02 RX ADMIN — LIDOCAINE HYDROCHLORIDE 60 MG: 20 INJECTION, SOLUTION INFILTRATION; PERINEURAL at 08:02

## 2020-10-02 SDOH — HEALTH STABILITY: MENTAL HEALTH: CURRENT SMOKER: 0

## 2020-10-02 ASSESSMENT — LIFESTYLE VARIABLES: TOBACCO_USE: 0

## 2020-10-02 ASSESSMENT — MIFFLIN-ST. JEOR: SCORE: 924.94

## 2020-10-02 NOTE — DISCHARGE INSTRUCTIONS
Home Care Instructions                Procedure: Epidural injection or joint injection    Activity:    Rest today    Do not work today    Resume normal activity tomorrow    Pain:    You may experience soreness at the injection site for 1 to 3 days.    You may use an ice pack for 20 minutes every 2 hours for the first 24 hours    You may use a heating pad after the first 24 hours    You may use Tylenol  (acetaminophen) every 4 hours or other pain medicines as directed by your physician    Safety  Sedation medicine, if given may remain active for many hours.    It is important for the next 24 hours that you do not:    Drive a car    Operate machines or power tools    Consume alcohol, including beer    Sign any important papers or legal documents    You may experience numbness radiating into your legs or arms, (depending on the procedure location)  This numbness may last several hours.  Until the numb sensation returns to normal please use caution in walking, climbing stairs, stepping out of your vehicle, etc.    Common side effects of steroids:  Not everyone will experience corticosteroid side effects. If side effects are experienced they will gradually subside in the 7-10 day period following an injection.    Most common side effects include:    Flushed face and/or chest    Feeling of warmth, particularly in face but could be overall feeling of warmth    Increased blood sugar in diabetic patients    Menstrual irregularities may occur.  If taking hormone based birth control an alternate method of birth control is recommended    Sleep disturbances and/or mood swings are possible    Leg cramps    Please contact us if you have:  Severe pain   Fever more than 101.5 degrees Fahrenheit  Signs of infection (redness, swelling or drainage)      If you have questions during normal business hours (8am-5pm Monday-Friday) contact the [facility] at [phone]. If you need help after hours, we recommend that you go to a  hospital emergency room or dial 911.

## 2020-10-02 NOTE — ANESTHESIA PREPROCEDURE EVALUATION
Anesthesia Pre-Procedure Evaluation    Patient: Coby Damon   MRN: [id number] : 1940          Preoperative Diagnosis: Chronic right SI joint pain [M53.3, G89.29]    Procedure(s):  Sacroiliac Therapeutic Injection Right    Past Medical History:   Diagnosis Date     Breast cancer (H) 2004    lumpectomy and 33 lymph nodes removed.     Shingles      Past Surgical History:   Procedure Laterality Date     COLONOSCOPY N/A 2014    Procedure: COLONOSCOPY;  Surgeon: Bandar Guidry MD;  Location: [facility]     HYSTERECTOMY, PAP NO LONGER INDICATED       INJECT EPIDURAL CERVICAL N/A 2015    Procedure: INJECT EPIDURAL CERVICAL;  Surgeon: Sawyer Webster MD;  Location: [facility]     INJECT EPIDURAL CERVICAL Right 2019    Procedure: Cervical 6-7 Spine Injection;  Surgeon: Sawyer Webster MD;  [facility]     INJECT JOINT SACROILIAC Bilateral 10/24/2019    Procedure: Bilateral Sacroiliac Joint Injections;  Surgeon: Sawyer Webster MD;  [facility]     INJECT JOINT SACROILIAC Bilateral 2020    Procedure: Bilaterl sacroiliac joint injections;  Surgeon: Sawyer Webster MD;  [facility]     PHACOEMULSIFICATION WITH STANDARD INTRAOCULAR LENS IMPLANT Left 11/15/2018    Procedure: PHACOEMULSIFICATION WITH STANDARD INTRAOCULAR LENS IMPLANT LEFT EYE;  Surgeon: Rian Ford MD;  [facility]     PHACOEMULSIFICATION WITH STANDARD INTRAOCULAR LENS IMPLANT Right 2018    Procedure: PHACOEMULSIFICATION WITH STANDARD INTRAOCULAR LENS IMPLANT RIGHT EYE;  Surgeon: Rian Ford MD;  Location[facility]       Anesthesia Evaluation     . Pt has had prior anesthetic. Type: General and MAC    No history of anesthetic complications          ROS/MED HX    ENT/Pulmonary:  - neg pulmonary ROS    (-) tobacco use   Neurologic:  - neg neurologic ROS     Cardiovascular:     (+) Dyslipidemia, hypertension----. : . . . :. . Previous cardiac testing date:results:date: results:ECG reviewed  date:8-2015 results:SR w/ first degree AV block date: results:          METS/Exercise Tolerance:     Hematologic:  - neg hematologic  ROS       Musculoskeletal:   (+) arthritis,  -       GI/Hepatic:  - neg GI/hepatic ROS       Renal/Genitourinary:  - ROS Renal section negative   (+) Pt has no history of transplant,       Endo:  - neg endo ROS       Psychiatric:  - neg psychiatric ROS       Infectious Disease:  - neg infectious disease ROS       Malignancy:   (+) Malignancy History of Breast  Breast CA Remission status post.         Other:    (+) No chance of pregnancy C-spine cleared: N/A, no H/O Chronic Pain,no other significant disability   - neg other ROS                        Physical Exam  Normal systems: cardiovascular, pulmonary and dental    Airway   Mallampati: II  TM distance: >3 FB  Neck ROM: full    Dental     Cardiovascular   Rhythm and rate: regular and normal      Pulmonary    breath sounds clear to auscultation            Lab Results   Component Value Date    WBC 4.9 06/04/2014    HGB 13.6 06/04/2014    HCT 41.1 06/04/2014     06/04/2014    SED 11 06/04/2014     12/13/2019    POTASSIUM 4.1 12/13/2019    CHLORIDE 105 12/13/2019    CO2 29 12/13/2019    BUN 12 12/13/2019    CR 0.78 12/13/2019    GLC 95 12/13/2019    ASHLEY 9.3 12/13/2019    ALBUMIN 4.0 12/13/2019    PROTTOTAL 7.7 12/13/2019    ALT 20 12/13/2019    AST 19 12/13/2019    ALKPHOS 96 12/13/2019    BILITOTAL 0.3 12/13/2019    LIPASE 89 06/04/2014       Preop Vitals  BP Readings from Last 3 Encounters:   09/24/20 123/71   09/21/20 118/66   09/09/20 118/72    Pulse Readings from Last 3 Encounters:   09/24/20 62   09/21/20 91   09/09/20 84      Resp Readings from Last 3 Encounters:   09/24/20 16   01/08/20 16   12/13/19 22    SpO2 Readings from Last 3 Encounters:   09/24/20 100%   09/21/20 99%   01/08/20 98%      Temp Readings from Last 1 Encounters:   09/24/20 97.9  F (36.6  C) (Oral)    Ht Readings from Last 1 Encounters:   01/08/20  "1.549 m (5' 1\")      Wt Readings from Last 1 Encounters:   09/21/20 51.3 kg (113 lb)    Estimated body mass index is 21.35 kg/m  as calculated from the following:    Height as of 1/8/20: 1.549 m (5' 1\").    Weight as of 9/21/20: 51.3 kg (113 lb).       Anesthesia Plan      History & Physical Review  History and physical reviewed and following examination; no interval change.    ASA Status:  2 .    NPO Status:  > 6 hours    Plan for MAC with Intravenous and Propofol induction. Maintenance will be TIVA.  Reason for MAC:  Deep or markedly invasive procedure (G8)      patient smoked on day of surgeryThe patient is not a current smoker      Postoperative Care  Postoperative pain management:  Oral pain medications.      Consents  Anesthetic plan, risks, benefits and alternatives discussed with:  Patient.  Use of blood products discussed: No .   .                   SHONDA Yang CRNA  "

## 2020-10-02 NOTE — OP NOTE
CHIEF COMPLAINT: Neck pain secondary to cervical spondylosis and cervical disc degeneration  PROCEDURE: C6-7 Interlaminar epidural steroid injection using fluoroscopic guidance with contrast dye.   PROCEDURE DETAILS: After written informed consent was obtained from the patient, the patient was escorted to the procedure room.  The patient was placed in the prone position.  A  time out  was conducted to verify patient identity, procedure to be performed, side, site, allergies and any special requirements.  The skin over the neck and upper back region were prepped and draped in normal sterile fashion. Fluoroscopy was used to identify the C6-7 interspace in an AP view and the skin was anesthetized with 2 mL of 1% lidocaine with bicarbonate buffer.  A 20-gauge 3-1/2 inch Tuohy needle was advanced using the loss of resistance technique with preservative free normal saline with fluoroscopic guidance. After negative aspiration for CSF and blood, 1.5 cc of Isovue contrast dye was injected revealing the appropriate cervical epidurogram without evidence of intrathecal or intravascular spread. Following this, a 3-mL solution of 40 mg of triamcinolone with 2 cc preservative-free normal saline was slowly injected.  After injection of the medication, as the needle tip was withdrawn, it was flushed with local anesthetic.  The patient was monitored with blood pressure and pulse oximetry machines with the assistance of an RN throughout the procedure.  The patient was alert and responsive to questions throughout the procedure.   The patient tolerated the procedure well and was observed in the post-procedural area.  The patient was dismissed without apparent complications.     DIAGNOSIS:  1. Neck pain secondary to cervical spondylosis and cervical disc degeneration as well as spondylolisthesis at C4-5 and C3-4 most likely contributing to right C4 and right C5 radicular symptoms.  PLAN:  1. Performed a repeat C6-7 interlaminar epidural  "steroid injection.   2. The patient was instructed to call the [place] [facility] if today's procedure is not helpful.  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    Sawyer Webster MD  Diplomate of the American Board of Anesthesiology, Pain Medicine    "

## 2020-10-02 NOTE — ANESTHESIA POSTPROCEDURE EVALUATION
Patient: Coby Damon    Procedure(s):  INJECTION, SPINE, CERVICAL 6-7, EPIDURAL    Diagnosis:Cervicalgia [M54.2]  Diagnosis Additional Information: No value filed.    Anesthesia Type:  MAC    Note:  Anesthesia Post Evaluation    Patient location during evaluation: Phase 2  Patient participation: Able to fully participate in evaluation  Level of consciousness: awake  Pain management: adequate  Airway patency: patent  Cardiovascular status: acceptable and hemodynamically stable  Respiratory status: acceptable, room air and nonlabored ventilation  Hydration status: stable  PONV: none     Anesthetic complications: None    Comments: Patient was happy with the anesthesia care received and no anesthesia related complications were noted.  I will follow up with the patient again if it is needed.        Last vitals:  Vitals:    10/02/20 0730   BP: 136/75   Resp: 16   Temp: 97.5  F (36.4  C)   SpO2: 98%         Electronically Signed By: SHONDA Yang CRNA  October 2, 2020  8:18 AM

## 2020-10-02 NOTE — ANESTHESIA CARE TRANSFER NOTE
Patient: Coby Damon    Procedure(s):  INJECTION, SPINE, CERVICAL 6-7, EPIDURAL    Diagnosis: Cervicalgia [M54.2]  Diagnosis Additional Information: No value filed.    Anesthesia Type:   MAC     Note:  Airway :Face Mask  Patient transferred to:Phase II  Handoff Report: Identifed the Patient, Identified the Reponsible Provider, Reviewed the pertinent medical history, Discussed the surgical course, Reviewed Intra-OP anesthesia mangement and issues during anesthesia, Set expectations for post-procedure period and Allowed opportunity for questions and acknowledgement of understanding      Vitals: (Last set prior to Anesthesia Care Transfer)    CRNA VITALS  10/2/2020 0742 - 10/2/2020 0817      10/2/2020             Pulse:  81    SpO2:  99 %                Electronically Signed By: SHONDA Yang CRNA  October 2, 2020  8:17 AM

## 2020-10-26 DIAGNOSIS — M54.40 LOW BACK PAIN WITH SCIATICA, SCIATICA LATERALITY UNSPECIFIED, UNSPECIFIED BACK PAIN LATERALITY, UNSPECIFIED CHRONICITY: Primary | ICD-10-CM

## 2020-10-27 NOTE — TELEPHONE ENCOUNTER
Gabapentin      Last Written Prescription Date:  9/30/19  Last Fill Quantity: 180,   # refills: 1  Last Office Visit: 9/21/2020  Future Office visit:       Routing refill request to provider for review/approval because:  Drug not on the [facility], [facility] or [facility] refill protocol or controlled substance

## 2020-10-28 RX ORDER — GABAPENTIN 300 MG/1
CAPSULE ORAL
Qty: 180 CAPSULE | Refills: 0 | Status: SHIPPED | OUTPATIENT
Start: 2020-10-28 | End: 2021-01-25 | Stop reason: DRUGHIGH

## 2020-11-30 ENCOUNTER — HOSPITAL ENCOUNTER (OUTPATIENT)
Dept: MAMMOGRAPHY | Facility: CLINIC | Age: 80
Discharge: HOME | End: 2020-11-30
Attending: FAMILY MEDICINE | Admitting: FAMILY MEDICINE
Payer: COMMERCIAL

## 2020-11-30 PROCEDURE — 77067 SCR MAMMO BI INCL CAD: CPT

## 2020-12-04 DIAGNOSIS — I10 HYPERTENSION GOAL BP (BLOOD PRESSURE) < 140/90: ICD-10-CM

## 2020-12-04 RX ORDER — LISINOPRIL 10 MG/1
TABLET ORAL
Qty: 90 TABLET | Refills: 0 | Status: SHIPPED | OUTPATIENT
Start: 2020-12-04 | End: 2021-01-25

## 2020-12-04 NOTE — TELEPHONE ENCOUNTER
"  Requested Prescriptions   Pending Prescriptions Disp Refills     lisinopril (ZESTRIL) 10 MG tablet [Pharmacy Med Name: Lisinopril 10 MG Oral Tablet] 90 tablet 0     Sig: Take 1 tablet by mouth once daily   9/21/2020 last office visit    ACE Inhibitors (Including Combos) Protocol Passed - 12/4/2020  9:16 AM        Passed - Blood pressure under 140/90 in past 12 months     BP Readings from Last 3 Encounters:   10/02/20 136/75   09/24/20 123/71   09/21/20 118/66           Passed - Recent (12 mo) or future (30 days) visit within the authorizing provider's specialty     Patient has had an office visit with the authorizing provider or a provider within the authorizing providers department within the previous 12 mos or has a future within next 30 days. See \"Patient Info\" tab in inbasket, or \"Choose Columns\" in Meds & Orders section of the refill encounter.          Passed - Medication is active on med list        Passed - Patient is age 18 or older        Passed - No active pregnancy on record        Passed - Normal serum creatinine on file in past 12 months     Recent Labs   Lab Test 12/13/19  1119   CR 0.78     Ok to refill medication if creatinine is low          Passed - Normal serum potassium on file in past 12 months     Recent Labs   Lab Test 12/13/19  1119   POTASSIUM 4.1           Passed - No positive pregnancy test within past 12 months         Prescription approved per [facility] Refill Protocol.  Whitney Sims RN      "

## 2021-01-03 DIAGNOSIS — M81.0 AGE-RELATED OSTEOPOROSIS WITHOUT CURRENT PATHOLOGICAL FRACTURE: ICD-10-CM

## 2021-01-05 RX ORDER — ALENDRONATE SODIUM 70 MG/1
TABLET ORAL
Qty: 12 TABLET | Refills: 0 | Status: SHIPPED | OUTPATIENT
Start: 2021-01-05 | End: 2021-01-25

## 2021-01-05 NOTE — TELEPHONE ENCOUNTER
Routing refill request to provider for review/approval because:  Labs not current:  Creatinine    Jessica Ruvalcaba RN

## 2021-01-08 ENCOUNTER — VIRTUAL VISIT (OUTPATIENT)
Dept: FAMILY MEDICINE | Facility: CLINIC | Age: 81
End: 2021-01-08
Payer: COMMERCIAL

## 2021-01-08 DIAGNOSIS — R19.7 DIARRHEA, UNSPECIFIED TYPE: Primary | ICD-10-CM

## 2021-01-08 PROCEDURE — 99213 OFFICE O/P EST LOW 20 MIN: CPT | Mod: 95 | Performed by: FAMILY MEDICINE

## 2021-01-08 NOTE — PROGRESS NOTES
"Coby is a 81 year old who is being evaluated via a billable telephone visit.      What phone number would you like to be contacted at? [phone]  How would you like to obtain your AVS? Mail a copy  Assessment & Plan     Diarrhea, unspecified type  Uncertain origin.  Since symptoms have improved with just the urgency feeling of need for a bowel movement and passing gas over the last 12 hours, I believe we can treat symptomatically.  She will continue clear liquids, Pepto-Bismol will remain okay to be used, and activity to help replace bacterial grzegorz.  Should it increase significantly again, patient may need to reconsider and contact health care again even going to emergency room.  Should she have both nausea and vomiting she would need to come to the emergency room.  We discussed Covid testing which I will order, but she may choose to delay if she has to travel a great distance for this testing.  She has no other symptoms of Covid and I think this is more probably a norovirus or something similar.  I also will have our RN triage team contact her on Monday and make sure she is doing better.  On review of medication alendronate might cause diarrhea but she has been on this for some time.  I anticipate this is a non-Covid virus more than anything.  \"}    15 minutes spent on the date of the encounter doing chart review, history and exam, documentation and further activities as noted above           MEDICATIONS:  Continue current medications without change  See Patient Instructions    No follow-ups on file.    Perry Beach MD  [facility]     Coby is a 81 year old who presents to clinic today for the following health issues     HPI       Diarrhea  Onset/Duration: 1 day  Description:       Consistency of stool: loose, yellow, green and mucousy       Blood in stool: no       Number of loose stools past 24 hours: 15+  Progression of Symptoms: slightly less movements and " intermittent  Accompanying signs and symptoms:       Fever: no       Nausea/Vomiting: no       Abdominal pain: YES       Weight loss: no       Episodes of constipation: no  History   Ill contacts: no  Recent use of antibiotics: no  Recent travels: no  Recent medication-new or changes(Rx or OTC): no  Precipitating or alleviating factors: None  Therapies tried and outcome: PeptoBismol and Gatorade haven't really helped.    Patient felt well ate breakfast and lunch which was usual meals for her.  She does not think any of them were old leftovers or that there was anything bad about them.  Prior to supper last night she started having crampy explosive watery stools as described above.  This is unusual for her.  She does not believe any of family or other people with whom she has been in contact have had symptoms like this.  She has used some Pepto-Bismol.  She essentially was up most of the night and today feels just tired and fatigued and washed out.  She has not had fever.  She has no other symptoms of Covid.  Since morning and this is approximately around 3 AM, she now has only passed gas and had cramping. Patient still has gallbladder.  Currently only mild abdominal discomfort and she is able to consume liquids as above    Review of Systems   Constitutional, HEENT, cardiovascular, pulmonary, gi and gu systems are negative, except as otherwise noted.      Objective    Vitals - Patient Reported  Temperature (Patient Reported): 97.7  F (36.5  C)  Pain Score: Moderate Pain (5)  Pain Loc: Abdomen        Physical Exam   healthy, alert, mild distress and cooperative  PSYCH: Alert and oriented times 3; coherent speech, normal   rate and volume, able to articulate logical thoughts, able   to abstract reason, no tangential thoughts, no hallucinations   or delusions  Her affect is normal  RESP: No cough, no audible wheezing, able to talk in full sentences  Remainder of exam unable to be completed due to telephone  visits    Most recent blood work was December 2019.      Phone call duration: 11 minutes

## 2021-01-08 NOTE — PATIENT INSTRUCTIONS
1.  Clear liquids including Jell-O until you have not had a stool for at least 24 or more hours.  Then gradually increase diet with simple things such as toast, bananas, oatmeal.  2.  Covid testing is ordered but based on current symptoms it seems low risk.  When they call for the appointment you can discuss with them whether testing should be done or not.  I am okay if it is not done at this time.  3.  He had activity 1 carton of yogurt daily to replenish bacteria.  Other option is probiotic from [employer].  Acidophilus would be consistent  4.  Should nausea and vomiting along with diarrhea develop, emergency room visit may be needed.  If other symptoms develop certainly keep us informed.  I have sent a message that my RN staff will see on Monday, January 11 and hopefully one of them give you a call to see how you are doing.

## 2021-01-11 ENCOUNTER — TELEPHONE (OUTPATIENT)
Dept: FAMILY MEDICINE | Facility: CLINIC | Age: 81
End: 2021-01-11

## 2021-01-11 ENCOUNTER — NURSE TRIAGE (OUTPATIENT)
Dept: NURSING | Facility: CLINIC | Age: 81
End: 2021-01-11

## 2021-01-11 NOTE — TELEPHONE ENCOUNTER
Patient wanted to know how to progress her diet.  Home care advice given with post diarrhea progression diet.  Clear liquids 24 hours.  Soup, soda crackers, white rice, pretzels, bananas -12 hours  Then regular diet as tolerated.  No dairy, citrus, raw fruits and vegetables, fried or spicy food for 2-5 days.  RECOMMENDED DISPOSITION:  Home care advice - pot diarrhea  Will comply with recommendation: Yes  If further questions/concerns or if symptoms do not improve, worsen or new symptoms develop, call your PCP or [facility] as soon as possible.      Guideline used:  Telephone Triage Protocols for Nurses, Fifth Edition, Ximena Moss, DANIELA, RN

## 2021-01-11 NOTE — TELEPHONE ENCOUNTER
Will route to Dr. Beach as FYI. See follow up call below.     Nya Mckeon, MITCHN, RN  [facility]

## 2021-01-11 NOTE — TELEPHONE ENCOUNTER
Perry Beach MD  P [facility] 1 Triage             She had diarrhea Thursday night which slowed for last 12 hours.  Seems one time, maybe viral.  Please call today for follow up after Friday visit.  I ordered COVID test but told her she could delay unless in [place] testing available, for now.  See visit note 1/8/2021. Perry Beach MD

## 2021-01-11 NOTE — TELEPHONE ENCOUNTER
Attempted to call patient, no answer. Left message for a return call to the clinic when available.     MITCH LeighN, RN  [facility]

## 2021-01-11 NOTE — TELEPHONE ENCOUNTER
Triage Call:    Patient returning call from clinic to follow up on how she is doing. See telephone encounter dated 1/11/21.    Pt states she has improved 98% since Friday.   No diarrhea since around 4am on 1/8/21.  No new or worsening sx, all sx are improving at this time.   Fatigue has improved.   Patient states she feels good.     Patient still considering having COVID test pending where she can complete the test.   RN will send as FYI to clinic to update Dr. Beach.     Cece Murphy RN 01/11/21 9:03 AM  [facility]      Reason for Disposition    [1] Follow-up call from patient regarding patient's clinical status AND [2] information NON-URGENT    Additional Information    Negative: Lab or radiology calling with test results    Negative: Caller requesting lab results    Negative: ED call to PCP    Negative: Physician call to PCP    Negative: Call about patient who is currently hospitalized    Negative: Lab or radiology calling with CRITICAL test results    Negative: [1] Prescription not at pharmacy AND [2] was prescribed today by PCP    Negative: [1] Follow-up call from patient regarding patient's clinical status AND [2] information urgent    Negative: [1] Caller requests to speak ONLY to PCP AND [2] urgent question    Negative: [1] Caller requests to speak to PCP now AND [2] won't tell us reason for call  (Exception: if 10 pm to 6 am, caller must first discuss reason for the call)    Negative: Notification of hospital admission    Negative: Notification of death    Negative: Lab calling with strep throat test results and triager can call in prescription    Negative: Lab calling with urinalysis test results and triager can call in prescription    Negative: Medication questions    Protocols used: PCP CALL - NO TRIAGE-A-

## 2021-01-11 NOTE — TELEPHONE ENCOUNTER
See triage note from 1/11/2021.  Cece Murphy RN 01/11/21 9:14 AM  [facility]

## 2021-01-25 ENCOUNTER — TELEPHONE (OUTPATIENT)
Dept: FAMILY MEDICINE | Facility: CLINIC | Age: 81
End: 2021-01-25

## 2021-01-25 ENCOUNTER — OFFICE VISIT (OUTPATIENT)
Dept: FAMILY MEDICINE | Facility: CLINIC | Age: 81
End: 2021-01-25
Payer: COMMERCIAL

## 2021-01-25 VITALS
OXYGEN SATURATION: 98 % | TEMPERATURE: 97.1 F | RESPIRATION RATE: 20 BRPM | SYSTOLIC BLOOD PRESSURE: 122 MMHG | WEIGHT: 112.06 LBS | BODY MASS INDEX: 21.17 KG/M2 | HEART RATE: 56 BPM | DIASTOLIC BLOOD PRESSURE: 80 MMHG

## 2021-01-25 DIAGNOSIS — Z00.00 ENCOUNTER FOR MEDICARE ANNUAL WELLNESS EXAM: ICD-10-CM

## 2021-01-25 DIAGNOSIS — M54.40 LOW BACK PAIN WITH SCIATICA, SCIATICA LATERALITY UNSPECIFIED, UNSPECIFIED BACK PAIN LATERALITY, UNSPECIFIED CHRONICITY: ICD-10-CM

## 2021-01-25 DIAGNOSIS — Z23 NEED FOR VACCINATION: Primary | ICD-10-CM

## 2021-01-25 DIAGNOSIS — M81.0 AGE-RELATED OSTEOPOROSIS WITHOUT CURRENT PATHOLOGICAL FRACTURE: ICD-10-CM

## 2021-01-25 DIAGNOSIS — R19.7 DIARRHEA, UNSPECIFIED TYPE: ICD-10-CM

## 2021-01-25 DIAGNOSIS — I10 HYPERTENSION GOAL BP (BLOOD PRESSURE) < 140/90: ICD-10-CM

## 2021-01-25 LAB
ALBUMIN SERPL-MCNC: 3.8 G/DL (ref 3.4–5)
ALP SERPL-CCNC: 71 U/L (ref 40–150)
ALT SERPL W P-5'-P-CCNC: 15 U/L (ref 0–50)
ANION GAP SERPL CALCULATED.3IONS-SCNC: 4 MMOL/L (ref 3–14)
AST SERPL W P-5'-P-CCNC: 11 U/L (ref 0–45)
BASOPHILS # BLD AUTO: 0 10E9/L (ref 0–0.2)
BASOPHILS NFR BLD AUTO: 0.6 %
BILIRUB SERPL-MCNC: 0.4 MG/DL (ref 0.2–1.3)
BUN SERPL-MCNC: 15 MG/DL (ref 7–30)
CALCIUM SERPL-MCNC: 9 MG/DL (ref 8.5–10.1)
CHLORIDE SERPL-SCNC: 103 MMOL/L (ref 94–109)
CO2 SERPL-SCNC: 29 MMOL/L (ref 20–32)
CREAT SERPL-MCNC: 0.79 MG/DL (ref 0.52–1.04)
CRP SERPL-MCNC: <2.9 MG/L (ref 0–8)
DIFFERENTIAL METHOD BLD: NORMAL
EOSINOPHIL NFR BLD AUTO: 2.6 %
ERYTHROCYTE [DISTWIDTH] IN BLOOD BY AUTOMATED COUNT: 12.8 % (ref 10–15)
GFR SERPL CREATININE-BSD FRML MDRD: 70 ML/MIN/{1.73_M2}
GLUCOSE SERPL-MCNC: 91 MG/DL (ref 70–99)
HCT VFR BLD AUTO: 38.6 % (ref 35–47)
HGB BLD-MCNC: 13 G/DL (ref 11.7–15.7)
IMM GRANULOCYTES # BLD: 0 10E9/L (ref 0–0.4)
IMM GRANULOCYTES NFR BLD: 0.2 %
LYMPHOCYTES # BLD AUTO: 1.8 10E9/L (ref 0.8–5.3)
LYMPHOCYTES NFR BLD AUTO: 36.1 %
MCH RBC QN AUTO: 32.1 PG (ref 26.5–33)
MCHC RBC AUTO-ENTMCNC: 33.7 G/DL (ref 31.5–36.5)
MCV RBC AUTO: 95 FL (ref 78–100)
MONOCYTES # BLD AUTO: 0.4 10E9/L (ref 0–1.3)
MONOCYTES NFR BLD AUTO: 8.6 %
NEUTROPHILS # BLD AUTO: 2.6 10E9/L (ref 1.6–8.3)
NEUTROPHILS NFR BLD AUTO: 51.9 %
NRBC # BLD AUTO: 0 10*3/UL
NRBC BLD AUTO-RTO: 0 /100
PLATELET # BLD AUTO: 290 10E9/L (ref 150–450)
POTASSIUM SERPL-SCNC: 4 MMOL/L (ref 3.4–5.3)
PROT SERPL-MCNC: 7.1 G/DL (ref 6.8–8.8)
RBC # BLD AUTO: 4.05 10E12/L (ref 3.8–5.2)
SODIUM SERPL-SCNC: 136 MMOL/L (ref 133–144)
WBC # BLD AUTO: 5 10E9/L (ref 4–11)

## 2021-01-25 PROCEDURE — 36415 COLL VENOUS BLD VENIPUNCTURE: CPT | Performed by: NURSE PRACTITIONER

## 2021-01-25 PROCEDURE — 99213 OFFICE O/P EST LOW 20 MIN: CPT | Mod: 25 | Performed by: NURSE PRACTITIONER

## 2021-01-25 PROCEDURE — 86140 C-REACTIVE PROTEIN: CPT | Performed by: NURSE PRACTITIONER

## 2021-01-25 PROCEDURE — G0009 ADMIN PNEUMOCOCCAL VACCINE: HCPCS | Performed by: NURSE PRACTITIONER

## 2021-01-25 PROCEDURE — 85025 COMPLETE CBC W/AUTO DIFF WBC: CPT | Performed by: NURSE PRACTITIONER

## 2021-01-25 PROCEDURE — 90732 PPSV23 VACC 2 YRS+ SUBQ/IM: CPT | Performed by: NURSE PRACTITIONER

## 2021-01-25 PROCEDURE — 80053 COMPREHEN METABOLIC PANEL: CPT | Performed by: NURSE PRACTITIONER

## 2021-01-25 PROCEDURE — 99397 PER PM REEVAL EST PAT 65+ YR: CPT | Mod: 25 | Performed by: NURSE PRACTITIONER

## 2021-01-25 RX ORDER — LISINOPRIL 10 MG/1
10 TABLET ORAL DAILY
Qty: 90 TABLET | Refills: 0 | Status: SHIPPED | OUTPATIENT
Start: 2021-01-25 | End: 2021-03-09

## 2021-01-25 RX ORDER — GABAPENTIN 100 MG/1
CAPSULE ORAL
Qty: 450 CAPSULE | Refills: 1 | Status: SHIPPED | OUTPATIENT
Start: 2021-01-25 | End: 2021-05-25

## 2021-01-25 RX ORDER — GABAPENTIN 300 MG/1
300 CAPSULE ORAL 2 TIMES DAILY
Qty: 180 CAPSULE | Refills: 0 | Status: SHIPPED | OUTPATIENT
Start: 2021-01-25 | End: 2021-01-25 | Stop reason: DRUGHIGH

## 2021-01-25 RX ORDER — ALENDRONATE SODIUM 70 MG/1
70 TABLET ORAL
Qty: 12 TABLET | Refills: 3 | Status: SHIPPED | OUTPATIENT
Start: 2021-01-25 | End: 2022-03-22

## 2021-01-25 ASSESSMENT — PAIN SCALES - GENERAL: PAINLEVEL: NO PAIN (0)

## 2021-01-25 NOTE — NURSING NOTE
Prior to immunization administration, verified patients identity using patient s name and date of birth. Please see Immunization Activity for additional information.     Screening Questionnaire for Adult Immunization    Are you sick today?   No   Do you have allergies to medications, food, a vaccine component or latex?   No   Have you ever had a serious reaction after receiving a vaccination?   No   Do you have a long-term health problem with heart, lung, kidney, or metabolic disease (e.g., diabetes), asthma, a blood disorder, no spleen, complement component deficiency, a cochlear implant, or a spinal fluid leak?  Are you on long-term aspirin therapy?   No   Do you have cancer, leukemia, HIV/AIDS, or any other immune system problem?   No   Do you have a parent, brother, or sister with an immune system problem?   No   In the past 3 months, have you taken medications that affect  your immune system, such as prednisone, other steroids, or anticancer drugs; drugs for the treatment of rheumatoid arthritis, Crohn s disease, or psoriasis; or have you had radiation treatments?   No   Have you had a seizure, or a brain or other nervous system problem?   No   During the past year, have you received a transfusion of blood or blood    products, or been given immune (gamma) globulin or antiviral drug?   No   For women: Are you pregnant or is there a chance you could become       pregnant during the next month?   No   Have you received any vaccinations in the past 4 weeks?   No     Immunization questionnaire answers were all negative.        Per orders of Alexis Roe, injection of PPSV23 given by Toya Manley CMA. Patient instructed to remain in clinic for 15 minutes afterwards, and to report any adverse reaction to me immediately.       Screening performed by Toya Manley CMA on 1/25/2021 at 10:55 AM.

## 2021-01-25 NOTE — PATIENT INSTRUCTIONS
Patient Education   Personalized Prevention Plan  You are due for the preventive services outlined below.  Your care team is available to assist you in scheduling these services.  If you have already completed any of these items, please share that information with your care team to update in your medical record.  Health Maintenance Due   Topic Date Due     Zoster (Shingles) Vaccine (1 of 2) 01/08/1991     Pneumococcal Vaccine (2 of 2 - PPSV23) 04/23/2019     FALL RISK ASSESSMENT  12/13/2020      Start the new prescription with the Gabapentin 3 times a day today.     I also want you to start taking 2- 500 mg Tylenol 3 times a day as well. Every day. Take 8 hours apart while awake.

## 2021-01-25 NOTE — PROGRESS NOTES
"  SUBJECTIVE:   Coby Damon is a 81 year old female who presents for Preventive Visit.      Patient has been advised of split billing requirements and indicates understanding: Yes  Are you in the first 12 months of your Medicare Part B coverage?  No    Physical Health:    In general, how would you rate your overall physical health? good    Outside of work, how many days during the week do you exercise? 1 day/week    Outside of work, approximately how many minutes a day do you exercise?less than 15 minutes    If you drink alcohol do you typically have >3 drinks per day or >7 drinks per week? No    Do you usually eat at least 4 servings of fruit and vegetables a day, include whole grains & fiber and avoid regularly eating high fat or \"junk\" foods? NO    Do you have any problems taking medications regularly?  No    Do you have any side effects from medications? none    Needs assistance for the following daily activities: no assistance needed    Which of the following safety concerns are present in your home?  lack of grab bars in the bathroom     Hearing impairment: No    In the past 6 months, have you been bothered by leaking of urine? Yes at night time     Mental Health:    In general, how would you rate your overall mental or emotional health? good  PHQ-2 Score:      Do you feel safe in your environment? Yes    Have you ever done Advance Care Planning? (For example, a Health Directive, POLST, or a discussion with a medical provider or your loved ones about your wishes): No, advance care planning information given to patient to review.  Patient declined advance care planning discussion at this time.    Additional concerns to address?  YES, Patient has been having a diverticulits flare up since 2021 with pain on left side with diarrhea but is getting better.     Fall risk:  Fallen 2 or more times in the past year?: No  Any fall with injury in the past year?: No    Cognitive Screenin) Repeat 3 items " (Leader, Season, Table)    2) Clock draw: NORMAL  3) 3 item recall: Recalls 3 objects  Results: 3 items recalled: COGNITIVE IMPAIRMENT LESS LIKELY    Mini-CogTM Copyright S Liu. Licensed by the author for use in [facility]; reprinted with permission (joseph@[facility]). All rights reserved.      Do you have sleep apnea, excessive snoring or daytime drowsiness?: yes      Reviewed and updated as needed this visit by clinical staff  Tobacco  Allergies  Meds   Med Hx  Surg Hx  Fam Hx  Soc Hx        Reviewed and updated as needed this visit by Provider                Social History     Tobacco Use     Smoking status: Never Smoker     Smokeless tobacco: Never Used   Substance Use Topics     Alcohol use: Yes     Comment:  1 glass of wine with dinner                            Current providers sharing in care for this patient include:   Patient Care Team:  Bandar Will MD as PCP - General (Family Practice)  Bandar Will MD as Assigned PCP    The following health maintenance items are reviewed in Epic and correct as of today:  Health Maintenance   Topic Date Due     ZOSTER IMMUNIZATION (1 of 2) 01/08/1991     Pneumococcal Vaccine: 65+ Years (2 of 2 - PPSV23) 04/23/2019     MEDICARE ANNUAL WELLNESS VISIT  12/13/2020     FALL RISK ASSESSMENT  12/13/2020     LIPID  12/13/2024     ADVANCE CARE PLANNING  12/13/2024     DTAP/TDAP/TD IMMUNIZATION (2 - Td) 05/23/2026     DEXA  12/18/2034     PHQ-2  Completed     INFLUENZA VACCINE  Completed     Pneumococcal Vaccine: Pediatrics (0 to 5 Years) and At-Risk Patients (6 to 64 Years)  Aged Out     IPV IMMUNIZATION  Aged Out     MENINGITIS IMMUNIZATION  Aged Out     HEPATITIS B IMMUNIZATION  Aged Out     Lab work is in process  Labs reviewed in EPIC  BP Readings from Last 3 Encounters:   01/25/21 122/80   10/02/20 136/75   09/24/20 123/71    Wt Readings from Last 3 Encounters:   01/25/21 50.8 kg (112 lb 1 oz)   10/02/20 51.3 kg (113 lb)   09/21/20 51.3 kg (113  lb)                  Patient Active Problem List   Diagnosis     CARDIOVASCULAR SCREENING; LDL GOAL LESS THAN 130     Hypertension goal BP (blood pressure) < 140/90     Age-related osteoporosis without current pathological fracture     Hyperlipidemia LDL goal <130     Cervicalgia     Low back pain, unspecified back pain laterality, unspecified chronicity, with sciatica presence unspecified     Advance Care Planning     Scoliosis of thoracolumbar spine, unspecified scoliosis type     Diarrhea, unspecified type     Past Surgical History:   Procedure Laterality Date     COLONOSCOPY N/A 12/12/2014    Procedure: COLONOSCOPY;  Surgeon: Bandar Guidry MD;  Location: [facility]     HYSTERECTOMY, PAP NO LONGER INDICATED       INJECT EPIDURAL CERVICAL N/A 9/23/2015    Procedure: INJECT EPIDURAL CERVICAL;  Surgeon: Sawyer Webster MD;  Location: [facility]     INJECT EPIDURAL CERVICAL Right 11/8/2019    Procedure: Cervical 6-7 Spine Injection;  Surgeon: Sawyer Webster MD;  Location[facility]     INJECT EPIDURAL CERVICAL N/A 10/2/2020    Procedure: INJECTION, SPINE, CERVICAL 6-7, EPIDURAL;  Surgeon: Sawyer Webster MD;  Location[facility]     INJECT JOINT SACROILIAC Bilateral 10/24/2019    Procedure: Bilateral Sacroiliac Joint Injections;  Surgeon: Sawyer Webster MD;  [facility]     INJECT JOINT SACROILIAC Bilateral 9/24/2020    Procedure: Bilaterl sacroiliac joint injections;  Surgeon: Sawyer Webster MD;  [facility]     PHACOEMULSIFICATION WITH STANDARD INTRAOCULAR LENS IMPLANT Left 11/15/2018    Procedure: PHACOEMULSIFICATION WITH STANDARD INTRAOCULAR LENS IMPLANT LEFT EYE;  Surgeon: Rian Ford MD;  [facility]     PHACOEMULSIFICATION WITH STANDARD INTRAOCULAR LENS IMPLANT Right 11/29/2018    Procedure: PHACOEMULSIFICATION WITH STANDARD INTRAOCULAR LENS IMPLANT RIGHT EYE;  Surgeon: Rian Ford MD;  Location: [facility]       Social History     Tobacco Use     Smoking status: Never Smoker     Smokeless  "tobacco: Never Used   Substance Use Topics     Alcohol use: Yes     Comment:  1 glass of wine with dinner      History reviewed. No pertinent family history.      Current Outpatient Medications   Medication Sig Dispense Refill     alendronate (FOSAMAX) 70 MG tablet Take 1 tablet (70 mg) by mouth every 7 days 12 tablet 3     Cholecalciferol (VITAMIN D) 2000 UNITS tablet Take 2,000 Units by mouth daily       gabapentin (NEURONTIN) 100 MG capsule Take 1 capsule (100 mg) by mouth 2 times daily AND 3 capsules (300 mg) At Bedtime. Take 1 - 100 mg capsule in the morning. Then 1 - 100 mg capsule after lunch. Then take 3 - 100 mg capsules at bedtime. 450 capsule 1     IBUPROFEN PO Take 400 mg by mouth every 6 hours as needed for moderate pain       lisinopril (ZESTRIL) 10 MG tablet Take 1 tablet (10 mg) by mouth daily 90 tablet 0     Magnesium Oxide 250 MG TABS Take 1 tablet by mouth daily Patient takes only every other day.       Multiple Vitamins-Minerals (MULTIVITAMIN OR)        vitamin B complex with vitamin C (VITAMIN  B COMPLEX) tablet Take 1 tablet by mouth daily       acetaminophen (TYLENOL) 325 MG tablet Take 325-650 mg by mouth every 6 hours as needed       No Known Allergies      ROS:  Constitutional, HEENT, cardiovascular, pulmonary, gi and gu systems are negative, except as otherwise noted.    History of Diverticulitis, has had symptoms since beginning of the year, she is slowly improving, feeling better today, normal bowel movement Saturday. She is on a bland diet. No fevers or chills today. Taking probiotic daily.     OBJECTIVE:   /80   Pulse 56   Temp 97.1  F (36.2  C) (Temporal)   Resp 20   Wt 50.8 kg (112 lb 1 oz)   SpO2 98%   Breastfeeding No   BMI 21.17 kg/m   Estimated body mass index is 21.17 kg/m  as calculated from the following:    Height as of 10/2/20: 1.549 m (5' 1\").    Weight as of this encounter: 50.8 kg (112 lb 1 oz).  EXAM:   GENERAL APPEARANCE: healthy, alert and no " distress  EYES: Eyes grossly normal to inspection, PERRL and conjunctivae and sclerae normal  HENT: ear canals and TM's normal, nose and mouth without ulcers or lesions, oropharynx clear and oral mucous membranes moist  NECK: no adenopathy, no asymmetry, masses, or scars and thyroid normal to palpation  RESP: lungs clear to auscultation - no rales, rhonchi or wheezes  BREAST: no palpable axillary masses or adenopathy  CV: regular rate and rhythm, normal S1 S2, no S3 or S4, no murmur, click or rub, no peripheral edema and peripheral pulses strong  ABDOMEN: soft, nontender, no hepatosplenomegaly, no masses and bowel sounds normal  MS: no musculoskeletal defects are noted and gait is age appropriate without ataxia  SKIN: no suspicious lesions or rashes  NEURO: Normal strength and tone, sensory exam grossly normal, mentation intact and speech normal  PSYCH: mentation appears normal and affect normal/bright    Diagnostic Test Results:  Labs reviewed in Epic    ASSESSMENT / PLAN:   1. Encounter for Medicare annual wellness exam  - She is stable.   - Will stay on the bland diet for the next 2 weeks and continue with the probiotics. If symptoms return we will have her call.    2. Low back pain with sciatica, sciatica laterality unspecified, unspecified back pain laterality, unspecified chronicity  - Back pain is now worse, will increase the gabapentin daily and have her start tylenol 3 times. Daily.  Will follow up on the back pain in 4 weeks.    - gabapentin (NEURONTIN) 100 MG capsule; Take 1 capsule (100 mg) by mouth 2 times daily AND 3 capsules (300 mg) At Bedtime. Take 1 - 100 mg capsule in the morning. Then 1 - 100 mg capsule after lunch. Then take 3 - 100 mg capsules at bedtime.  Dispense: 450 capsule; Refill: 1    3. Diarrhea, unspecified type  - Will get lab work today to make sure no infection brewing.   - CBC with platelets and differential  - CRP, inflammation  - Comprehensive metabolic panel  - Fecal colorectal  "cancer screen (FIT); Future    4. Hypertension goal BP (blood pressure) < 140/90  - Blood pressure looks great will continue with current plan of care.   - lisinopril (ZESTRIL) 10 MG tablet; Take 1 tablet (10 mg) by mouth daily  Dispense: 90 tablet; Refill: 0    5. Age-related osteoporosis without current pathological fracture  - Tolerating no new dental pain.   - alendronate (FOSAMAX) 70 MG tablet; Take 1 tablet (70 mg) by mouth every 7 days  Dispense: 12 tablet; Refill: 3    Patient has been advised of split billing requirements and indicates understanding: Yes    COUNSELING:  Reviewed preventive health counseling, as reflected in patient instructions  Special attention given to:       Regular exercise       Healthy diet/nutrition       Dental care       Fall risk prevention       Osteoporosis prevention/bone health    Estimated body mass index is 21.17 kg/m  as calculated from the following:    Height as of 10/2/20: 1.549 m (5' 1\").    Weight as of this encounter: 50.8 kg (112 lb 1 oz).        She reports that she has never smoked. She has never used smokeless tobacco.    Appropriate preventive services were discussed with this patient, including applicable screening as appropriate for cardiovascular disease, diabetes, osteopenia/osteoporosis, and glaucoma.  As appropriate for age/gender, discussed screening for colorectal cancer, prostate cancer, breast cancer, and cervical cancer. Checklist reviewing preventive services available has been given to the patient.    Reviewed patients plan of care and provided an AVS. The Basic Care Plan (routine screening as documented in Health Maintenance) for Coby meets the Care Plan requirement. This Care Plan has been established and reviewed with the Patient.    Counseling Resources:  ATP IV Guidelines  Pooled Cohorts Equation Calculator  Breast Cancer Risk Calculator  BRCA-Related Cancer Risk Assessment: FHS-7 Tool  FRAX Risk Assessment  ICSI Preventive " Guidelines  Dietary Guidelines for Americans, 2010  USDA's MyPlate  ASA Prophylaxis  Lung CA Screening    Alexis Roe NP  [facility]

## 2021-01-25 NOTE — TELEPHONE ENCOUNTER
Spoke with patient and informed of results below. Patient understood. No further questions or concerns at this time.   Toya Manley MA

## 2021-01-25 NOTE — TELEPHONE ENCOUNTER
----- Message from Alexis Roe NP sent at 1/25/2021 12:48 PM CST -----  Please call and let her know that all labs normal, no signs of significant infection or inflammation at this point. If symptoms of the Diverticulitis continue to improve no need for further follow up.     Thank you,     Alexis Roe CNP

## 2021-03-02 ENCOUNTER — VIRTUAL VISIT (OUTPATIENT)
Dept: FAMILY MEDICINE | Facility: CLINIC | Age: 81
End: 2021-03-02
Payer: COMMERCIAL

## 2021-03-02 VITALS — BODY MASS INDEX: 21.16 KG/M2 | WEIGHT: 112 LBS

## 2021-03-02 DIAGNOSIS — M54.40 CHRONIC LOW BACK PAIN WITH SCIATICA, SCIATICA LATERALITY UNSPECIFIED, UNSPECIFIED BACK PAIN LATERALITY: Primary | ICD-10-CM

## 2021-03-02 DIAGNOSIS — G89.29 CHRONIC LOW BACK PAIN WITH SCIATICA, SCIATICA LATERALITY UNSPECIFIED, UNSPECIFIED BACK PAIN LATERALITY: Primary | ICD-10-CM

## 2021-03-02 PROCEDURE — 99213 OFFICE O/P EST LOW 20 MIN: CPT | Mod: 95 | Performed by: NURSE PRACTITIONER

## 2021-03-02 NOTE — PATIENT INSTRUCTIONS
- Back pain plan:    1. Stop the Gabapentin during the day. Continue with the 300 mg in at bedtime.     2. Because your kidney function is good, and the ibuprofen gives you so much relief use sparingly 400 mg, 2 times daily. Continue to use Tylenol  500-1000 mg up to three times daily.     3. Set up appointment with pain management to discuss more back injections.     4. I will follow up with you in 3 months to discuss where you are at with the back pain.     Call clinic sooner for worsening back pain.     Alexis Roe CNP  
No

## 2021-03-02 NOTE — PROGRESS NOTES
Coby is a 81 year old who is being evaluated via a billable telephone visit.      What phone number would you like to be contacted at? [phone]  How would you like to obtain your AVS? Reyeshart    Assessment & Plan     Chronic low back pain with sciatica, sciatica laterality unspecified, unspecified back pain laterality  - Back pain plan:    1. Stop the Gabapentin during the day. Continue with the 300 mg in at bedtime.     2. Because your kidney function is good, and the ibuprofen gives you so much relief use sparingly 400 mg, 2 times daily. Continue to use Tylenol  500-1000 mg up to three times daily.     3. Set up appointment with pain management to discuss more back injections.     4. I will follow up with you in 3 months to discuss where you are at with the back pain.     Call clinic sooner for worsening back pain.       Return in about 3 months (around 6/2/2021), or Back pain, for Recheck if symptoms worsen or fail to improve.    Alexis Roe NP  [facility]   Coby is a 81 year old who presents for the following health issues     HPI       Medication Followup of gabapentin 100mg cap    Taking Medication as prescribed: yes    Side Effects:  None    Medication Helping Symptoms:  NO-no effective still having pain.       Patient's stools have improved since adding probiotic.  Back pain not responding to gabapentin during the day. Helping over night. The low dose Ibuprofen only thing that helps the back. Wants to look into more injections. No new acute symptoms of concern. Mood is stable. No new weakness or issues with incontinence.           Review of Systems   Constitutional, HEENT, cardiovascular, pulmonary, gi and gu systems are negative, except as otherwise noted.      Objective           Vitals:  No vitals were obtained today due to virtual visit.    Physical Exam   alert and no distress  PSYCH: Alert and oriented times 3; coherent speech, normal   rate  and volume, able to articulate logical thoughts, able   to abstract reason, no tangential thoughts, no hallucinations   or delusions  Her affect is normal  RESP: No cough, no audible wheezing, able to talk in full sentences  Remainder of exam unable to be completed due to telephone visits                Phone call duration: 12 minutes

## 2021-03-02 NOTE — PROGRESS NOTES
"Coby is a 81 year old who is being evaluated via a billable telephone visit.      What phone number would you like to be contacted at? [phone]  How would you like to obtain your AVS? Mail a copy    [facility]    CHIEF COMPLAINT:   Pain  -Degenerative Scoliosis    INTERVAL HISTORY:  Last seen on 9/9/20       Recommendations/plan at the last visit included:  1. Physical Therapy:  NO   2. Clinical Health Psychologist:  NO    3. Diagnostic Studies:  None at this time  4. Medication Management:  No changes   5. Further procedures recommended: LLOYD and Bilateral SI joint injections  6. Recommendations to PCP. See above        Follow up with this provider: as needed    Since her last visit, Coby Damon reports:    She states that she has been having issues with her left shoulder. She states that she was on her step ladder (1 week before thanksgiving) when she got done, she forgot she was on the second step and she started to slip and go backwards and caught herself with both her arms and her left side got scraped. She eased to the floor and got back up. She states that she did not give her arm much thought after that. She states that she started exercising 3 weeks ago (sit and fit). She states that one exercise you put weights up over her head/above shoulder and knew she did something wrong. She states that since that day it has been \"bad\". She states that yesterday she almost went to the ER for it. She states that it hurts her neck to move. She states that it is better today. She is able to bring her arm above her head. She states that she has mobility. She states that her neck is bothered by this, but she feels this is more an issue with her shoulder. She does not feel this coming from her neck.     The last injection helped with her neck. The SI joint injections were not as effective. She states that she still does have some low back pain.     Pain Information:     Pain " "today 5/10      CURRENT RELEVANT PAIN MEDICATIONS:   Gabapentin 300mg at night  Ibuprofen 200mg-6-8/day lately (before was only taking 2 at night)       Patient is using the medication as prescribed:  YES  Is your medication helpful? YES   Medication side effects? no side effect    Previous Medications: (H--helped; HI--Helped initially; SWH-- somewhat helpful, NH--No help; W--worse; SE--side effects)   Opiates: none  NSAIDS: Ibuprofen H  Muscle Relaxants: was on one years ago \"hated it\" \"felt goofy\"  Anti-migraine mediations: none  Anti-depressants: none  Sleep aids: none  Anxiolytics: was on Valium years ago \"felt goofy\"  Neuropathics:  Gabapentin NH            Topicals: none  Other medications not covered above: Tylenol NH       Past Pain Treatments:  Pain Clinic:   No   PT: Yes, has one more session next week (has been helpful)  Psychologist: No  Relaxation techniques/biofeedback: No  Chiropractor: Yes, was not helpful  Acupuncture: No  Pharmacotherapy:           Opioids: No             Non-opioids:    Yes   TENs Unit: No   Injections:   09/23/2015 C7-T1 KATHY  10/24/2019 Bilateral SI joint injections  11/08/2019 C6-7 KATHY  09/24/2020 Bilateral SI joint injections  10/02/20 C6-7 KATHY  Self-care:   Yes, ice packs during the day, heating pad at night, hot showers  Surgeries related to pain: No     [place] Board of Pharmacy Data Base Reviewed:    No;  (if yes, As expected, no concern for misuse/abuse of controlled medications based on this report).      Medications:  Current Outpatient Medications   Medication Sig Dispense Refill     acetaminophen (TYLENOL) 325 MG tablet Take 325-650 mg by mouth every 6 hours as needed       alendronate (FOSAMAX) 70 MG tablet Take 1 tablet (70 mg) by mouth every 7 days 12 tablet 3     Cholecalciferol (VITAMIN D) 2000 UNITS tablet Take 2,000 Units by mouth daily       gabapentin (NEURONTIN) 100 MG capsule Take 1 capsule (100 mg) by mouth 2 times daily AND 3 capsules (300 mg) At " Bedtime. Take 1 - 100 mg capsule in the morning. Then 1 - 100 mg capsule after lunch. Then take 3 - 100 mg capsules at bedtime. 450 capsule 1     IBUPROFEN PO Take 400 mg by mouth every 6 hours as needed for moderate pain       lisinopril (ZESTRIL) 10 MG tablet Take 1 tablet (10 mg) by mouth daily 90 tablet 0     Magnesium Oxide 250 MG TABS Take 1 tablet by mouth daily Patient takes only every other day.       Multiple Vitamins-Minerals (MULTIVITAMIN OR)        Probiotic Product (PROBIOTIC-10 PO)        vitamin B complex with vitamin C (VITAMIN  B COMPLEX) tablet Take 1 tablet by mouth daily           PHYSICAL EXAM:     Vitals: LMP  (LMP Unknown)       Constitutional: healthy, alert and no distress  HEENT: Head atraumatic, normocephalic. Eyes without conjunctival injection or jaundice. Neck supple. No obvious neck masses.  Skin: No rash, lesions, or petechiae of exposed skin.   Psychiatric/mental status: Alert, without lethargy or stupor. Appropriate affect. Mood normal.       DIAGNOSTIC TESTS:  Imaging Studies: 10/2/20 and 10/20/20:  XR Surgery VIKI L/T 5 Min Fluoro w Stills    Assessment:  Coby Damon is a 80 year old female who presents today for follow up regarding her:    1. Left shoulder Pain  2. Chronic  SI joint pain  3. Cervicalgia    Had a fall in November and the exacerbated the pain with weight lifting. Will get an MRI of her left shoulder.     She continues to have low back pain. Discussed that we can repeat the SI joint injections.      Plan:    Diagnosis reviewed, treatment option addressed, and risk/benifits discussed.  Self-care instructions given.  I am recommending a multidisciplinary treatment plan to help this patient better manage pain.      1. Physical Therapy:  NO   2. Clinical Health Psychologist:  NO    3. Diagnostic Studies:  MRI left shoulder-I will call with results  4. Medication Management:  No changes   5. Further procedures recommended: will review again after MRI, may need  to repeat bilateral SI Joint injections  6. Recommendations to PCP. See above      Follow up with this provider: 8 weeks for a virtual visit    The total TIME spent on this patient on the day of the appointment was 16 minutes.    Time spent preparing to see the patient (reviewing records and tests) 2 minutes  Time spend face to face (or on the phone) with the patient 10 minutes  Time spent ordering tests, medications, procedures and referrals 2 minutes  Time spent Referring and communicating with other healthcare professionals 0 minutes  Time spent documenting clinical information in Epic 2 minutes        Lizeth Munoz PA-C   [facility] [place]

## 2021-03-05 ENCOUNTER — VIRTUAL VISIT (OUTPATIENT)
Dept: PALLIATIVE MEDICINE | Facility: CLINIC | Age: 81
End: 2021-03-05
Payer: COMMERCIAL

## 2021-03-05 DIAGNOSIS — M25.512 LEFT SHOULDER PAIN, UNSPECIFIED CHRONICITY: Primary | ICD-10-CM

## 2021-03-05 PROCEDURE — 99213 OFFICE O/P EST LOW 20 MIN: CPT | Mod: 95 | Performed by: PHYSICIAN ASSISTANT

## 2021-03-05 NOTE — PATIENT INSTRUCTIONS
After Visit Instructions:     Thank you for coming to [facility] for your care. It is my goal to partner with you to help you reach your optimal state of health.     I am recommending multidisciplinary care at this time.  The focus of care will be to continue gradual rehabilitation and pain management with medication adjustments as needed.    Continue daily self-care, identifying contributing factors, and monitoring variations in pain level. Continue to integrate self-care into your life.        Schedule follow-up with Lizeth Munoz PA-C in 8 weeks. You will need to make this appointment.     Imaging: MRI left shoulder    Procedures recommended: will review again after MRI, consider repeat SI Joint injection       Lizeth Munoz PA-C  [facility]3]    Contact information: [facility]  Clinic Number:  [phone]     Call with any questions about your care and for scheduling assistance.     Calls are returned Monday through Friday between 8 AM and 4:30 PM. We usually get back to you within 2 business days depending on the issue/request.    If we are prescribing your medications:    For opioid medication refills, call the clinic or send a Engagio message 7 days in advance.  Please include:    Name of requested medication    Name of the pharmacy.    For non-opioid medications, call your pharmacy directly to request a refill. Please allow 3-4 days to be processed.     Per [place] State Law:    All controlled substance prescriptions must be filled within 30 days of being written.      For those controlled substances allowing refills, pickup must occur within 30 days of last fill.      We believe regular attendance is key to your success in our program!      Any time you are unable to keep your appointment we ask that you call us at least 24 hours in advance to cancel.This will allow us to offer the appointment time to another  patient.   Multiple missed appointments may lead to dismissal from the clinic.

## 2021-03-08 DIAGNOSIS — I10 HYPERTENSION GOAL BP (BLOOD PRESSURE) < 140/90: ICD-10-CM

## 2021-03-09 RX ORDER — LISINOPRIL 10 MG/1
TABLET ORAL
Qty: 90 TABLET | Refills: 0 | Status: SHIPPED | OUTPATIENT
Start: 2021-03-09 | End: 2021-06-04

## 2021-03-09 NOTE — TELEPHONE ENCOUNTER
Prescription approved per [facility] Refill Protocol.    Jessica Ruvalcaba RN

## 2021-03-11 ENCOUNTER — HOSPITAL ENCOUNTER (OUTPATIENT)
Dept: MRI IMAGING | Facility: CLINIC | Age: 81
Discharge: HOME | End: 2021-03-11
Attending: PHYSICIAN ASSISTANT | Admitting: PHYSICIAN ASSISTANT
Payer: COMMERCIAL

## 2021-03-11 DIAGNOSIS — M25.512 LEFT SHOULDER PAIN, UNSPECIFIED CHRONICITY: ICD-10-CM

## 2021-03-11 PROCEDURE — 73221 MRI JOINT UPR EXTREM W/O DYE: CPT | Mod: 26 | Performed by: RADIOLOGY

## 2021-03-11 PROCEDURE — 73221 MRI JOINT UPR EXTREM W/O DYE: CPT | Mod: LT

## 2021-03-12 ENCOUNTER — TELEPHONE (OUTPATIENT)
Dept: PALLIATIVE MEDICINE | Facility: CLINIC | Age: 81
End: 2021-03-12

## 2021-03-12 DIAGNOSIS — M75.102 TEAR OF LEFT SUPRASPINATUS TENDON: ICD-10-CM

## 2021-03-12 DIAGNOSIS — M75.52 SUBACROMIAL BURSITIS OF LEFT SHOULDER JOINT: ICD-10-CM

## 2021-03-12 DIAGNOSIS — M25.512 LEFT SHOULDER PAIN, UNSPECIFIED CHRONICITY: Primary | ICD-10-CM

## 2021-03-12 DIAGNOSIS — M19.012 GLENOHUMERAL ARTHRITIS, LEFT: ICD-10-CM

## 2021-03-12 NOTE — TELEPHONE ENCOUNTER
Attempted to call patient regarding MRI results. No answer. Voicemail left to call [phone] back to discuss.     Lizeth Munoz PA-C on 3/12/2021 at 1:54 PM

## 2021-03-12 NOTE — TELEPHONE ENCOUNTER
Reviewed MRI results with patient today. Referral to orthopedics placed.     Lizeth Munoz PA-C on 3/12/2021 at 3:12 PM

## 2021-03-17 ENCOUNTER — OFFICE VISIT (OUTPATIENT)
Dept: ORTHOPEDICS | Facility: CLINIC | Age: 81
End: 2021-03-17
Attending: PHYSICIAN ASSISTANT
Payer: COMMERCIAL

## 2021-03-17 ENCOUNTER — ANCILLARY PROCEDURE (OUTPATIENT)
Dept: GENERAL RADIOLOGY | Facility: CLINIC | Age: 81
End: 2021-03-17
Attending: ORTHOPAEDIC SURGERY
Payer: COMMERCIAL

## 2021-03-17 VITALS
HEIGHT: 60 IN | DIASTOLIC BLOOD PRESSURE: 72 MMHG | WEIGHT: 110.2 LBS | SYSTOLIC BLOOD PRESSURE: 120 MMHG | BODY MASS INDEX: 21.64 KG/M2

## 2021-03-17 DIAGNOSIS — M25.512 LEFT SHOULDER PAIN: ICD-10-CM

## 2021-03-17 DIAGNOSIS — M19.012 GLENOHUMERAL ARTHRITIS, LEFT: ICD-10-CM

## 2021-03-17 DIAGNOSIS — M75.52 SUBACROMIAL BURSITIS OF LEFT SHOULDER JOINT: ICD-10-CM

## 2021-03-17 DIAGNOSIS — M75.102 TEAR OF LEFT SUPRASPINATUS TENDON: Primary | ICD-10-CM

## 2021-03-17 PROCEDURE — 73030 X-RAY EXAM OF SHOULDER: CPT | Mod: TC | Performed by: RADIOLOGY

## 2021-03-17 PROCEDURE — 99203 OFFICE O/P NEW LOW 30 MIN: CPT | Performed by: ORTHOPAEDIC SURGERY

## 2021-03-17 RX ORDER — TRIAMCINOLONE ACETONIDE 40 MG/ML
40 INJECTION, SUSPENSION INTRA-ARTICULAR; INTRAMUSCULAR ONCE
Status: CANCELLED | OUTPATIENT
Start: 2021-03-17 | End: 2021-03-17

## 2021-03-17 ASSESSMENT — PAIN SCALES - GENERAL: PAINLEVEL: MILD PAIN (3)

## 2021-03-17 ASSESSMENT — MIFFLIN-ST. JEOR: SCORE: 891.36

## 2021-03-17 NOTE — LETTER
3/17/2021         RE: Coby Damon  [address] [facility] [address]        Dear Colleague,    Thank you for referring your patient, Coby Damon, to the [facility]. Please see a copy of my visit note below.    ORTHOPEDIC CONSULT      Chief Complaint: Coby Damon is a 81 year old female who is being seen for   Chief Complaint   Patient presents with     Shoulder Pain     left shoulder pain     Consult     ref: Lizeth Munoz       History of Present Illness:   Today's visit:  Coby Damon is a 81 year old female who is seen in consultation at the request of Alexander for evaluation of left shoulder pain.    Pre-existing for a right shoulder issue back in 2015.  Today presents for a contralateral shoulder.  Complains of posterior lateral shoulder pain.  Describes it as achy and feels like he is guarding it.  Worse with reaching.  She feels like it began last November when she slipped and fell jarring her arm.  Nonradiating.  She feels like it flares a few times a week.  The last half a day impacting activity and life.  She is been taken some Tylenol and ibuprofen.    July 23, 2015 office visit:  Returns for evaluation and to discuss MRI results. Previous visit was reviewed. It is unchanged. Complains of significant weakness with overhead lifting type activities. Complains of mild to moderate pain at times. Describes it as achy. Nonradiating. She will be seen [facility] later today.  July 15, 2015 visit:  Presents the complaint of right shoulder and arm pain. She has been seen by [facility] for cervical spine pain. She had neck pain as long as 4 years ago. She received a injection at that point which resolved her symptoms. Pain return. She underwent a chiropractic treatment that helped her symptoms significantly. However started having more pain into the shoulder region. Pain is located anterolaterally. She also complains of weakness with overhead and  reaching. She has taken Aleve and ibuprofen withimprovement. She feels the symptoms began approximately 2 weeks after falling ground level onto her shoulders and arms. Major complaint is the weakness she feels.         Patient's past medical, surgical, social and family histories reviewed.     Past Medical History:   Diagnosis Date     Breast cancer (H) 2004    lumpectomy and 33 lymph nodes removed.     Shingles        Past Surgical History:   Procedure Laterality Date     COLONOSCOPY N/A 12/12/2014    Procedure: COLONOSCOPY;  Surgeon: Bandar Guidry MD;  [facility]     HYSTERECTOMY, PAP NO LONGER INDICATED       INJECT EPIDURAL CERVICAL N/A 9/23/2015    Procedure: INJECT EPIDURAL CERVICAL;  Surgeon: Sawyer Webster MD;  [facility]     INJECT EPIDURAL CERVICAL Right 11/8/2019    Procedure: Cervical 6-7 Spine Injection;  Surgeon: Sawyer Webster MD;  [facility]     INJECT EPIDURAL CERVICAL N/A 10/2/2020    Procedure: INJECTION, SPINE, CERVICAL 6-7, EPIDURAL;  Surgeon: Sawyer Webster MD;  [facility]     INJECT JOINT SACROILIAC Bilateral 10/24/2019    Procedure: Bilateral Sacroiliac Joint Injections;  Surgeon: Sawyer Webster MD;  [facility]     INJECT JOINT SACROILIAC Bilateral 9/24/2020    Procedure: Bilaterl sacroiliac joint injections;  Surgeon: Sawyer Webster MD;  [facility]     PHACOEMULSIFICATION WITH STANDARD INTRAOCULAR LENS IMPLANT Left 11/15/2018    Procedure: PHACOEMULSIFICATION WITH STANDARD INTRAOCULAR LENS IMPLANT LEFT EYE;  Surgeon: Rian Ford MD;  [facility]     PHACOEMULSIFICATION WITH STANDARD INTRAOCULAR LENS IMPLANT Right 11/29/2018    Procedure: PHACOEMULSIFICATION WITH STANDARD INTRAOCULAR LENS IMPLANT RIGHT EYE;  Surgeon: Rian Ford MD;  [facility]       Medications:  acetaminophen (TYLENOL) 325 MG tablet, Take 325-650 mg by mouth every 6 hours as needed  alendronate (FOSAMAX) 70 MG tablet, Take 1 tablet (70 mg) by mouth every  7 days  Cholecalciferol (VITAMIN D) 2000 UNITS tablet, Take 2,000 Units by mouth daily  gabapentin (NEURONTIN) 100 MG capsule, Take 1 capsule (100 mg) by mouth 2 times daily AND 3 capsules (300 mg) At Bedtime. Take 1 - 100 mg capsule in the morning. Then 1 - 100 mg capsule after lunch. Then take 3 - 100 mg capsules at bedtime.  IBUPROFEN PO, Take 400 mg by mouth every 6 hours as needed for moderate pain  lisinopril (ZESTRIL) 10 MG tablet, Take 1 tablet by mouth once daily  Magnesium Oxide 250 MG TABS, Take 1 tablet by mouth daily Patient takes only every other day.  Multiple Vitamins-Minerals (MULTIVITAMIN OR),   Probiotic Product (PROBIOTIC-10 PO),   vitamin B complex with vitamin C (VITAMIN  B COMPLEX) tablet, Take 1 tablet by mouth daily    No current facility-administered medications on file prior to visit.       No Known Allergies    Social History     Occupational History     Not on file   Tobacco Use     Smoking status: Never Smoker     Smokeless tobacco: Never Used   Substance and Sexual Activity     Alcohol use: Yes     Comment:  1 glass of wine with dinner      Drug use: No     Sexual activity: Never       No family history on file.    REVIEW OF SYSTEMS  10 point review systems performed otherwise negative as noted as per history of present illness.    Physical Exam:  Vitals: /72   Ht 1.524 m (5')   Wt 50 kg (110 lb 3.2 oz)   LMP  (LMP Unknown)   BMI 21.52 kg/m    BMI= Body mass index is 21.52 kg/m .  Constitutional: healthy, alert and no acute distress   Psychiatric: mentation appears normal and affect normal/bright  NEURO: no focal deficits  RESP: Normal with easy respirations and no use of accessory muscles to breathe, no audible wheezing or retractions  CV: LUE:   no edema         Regular rate and rhythm by palpation  SKIN: No erythema, rashes, excoriation, or breakdown. No evidence of infection.   JOINT/EXTREMITIES:left shoulder: No gross deformity.  No focal areas of tenderness.  Positive  Jacob.  Active motion 165/165/30/T6.  She does have some pain weakness with supraspinatus testing.     GAIT: not tested     Diagnostic Modalities:  left shoulder X-ray: No fractures or dislocations.  There is some joint space narrowing glenohumeral joint.  Associated with an osteophyte along the inferior humeral head.  Questionable intra-articular calcification noted on the Grashey view as well.  AC joint shows some irregularity and narrowing.  Axillary view shows some particular narrowing and irregularity along the posterior glenoid.  left shoulder MRI:    1. On a background of tendinosis, near full-thickness, bursal sided  tear of the anterior and middle fibers (11 mm AP dimension) of the  supraspinatus at the footprint.   a. No muscle bulk loss.  2. Tendinosis with at least partial thickness tearing of the  intra-articular portion of the long head of the biceps.  3. Severe glenohumeral osteoarthrosis and moderate to severe  osteoarthrosis of the acromioclavicular joint.  4. Subacromial/subdeltoid bursitis.  5. Suspected posterior and superior labral tearing.  6. Suspect ossified intra-articular body posterior superior glenoid.      Independent visualization of the images was performed.      Impression: left shoulder near full-thickness supraspinatus rotator cuff tendon tear with mild fatty infiltration  Left shoulder severe glenohumeral osteoarthritis  Left shoulder intra-articular long head biceps tendinosis with partial-thickness tearing    Plan:  All of the above pertinent physical exam and imaging modalities findings was reviewed with Coby.      We discussed her options.  We discussed steroid injections as well as physical therapy.  A physical therapy referral was placed today.  We also plan on proceeding with a steroid injection however she is getting her second Covid vaccine tomorrow.  We discussed seeing her approximately 2 weeks after this vaccination.  At that time she will return for a left  shoulder steroid injection.    Return to clinic 2, week(s), or sooner as needed for changes.  Re-x-ray on return: No    Peewee Rogers D.O.            Again, thank you for allowing me to participate in the care of your patient.        Sincerely,        Zain Rogers, DO

## 2021-03-17 NOTE — PROGRESS NOTES
ORTHOPEDIC CONSULT      Chief Complaint: Coby Damon is a 81 year old female who is being seen for   Chief Complaint   Patient presents with     Shoulder Pain     left shoulder pain     Consult     ref: Lizeth Munoz       History of Present Illness:   Today's visit:  Coby Damon is a 81 year old female who is seen in consultation at the request of Alexander for evaluation of left shoulder pain.    Pre-existing for a right shoulder issue back in 2015.  Today presents for a contralateral shoulder.  Complains of posterior lateral shoulder pain.  Describes it as achy and feels like he is guarding it.  Worse with reaching.  She feels like it began last November when she slipped and fell jarring her arm.  Nonradiating.  She feels like it flares a few times a week.  The last half a day impacting activity and life.  She is been taken some Tylenol and ibuprofen.    July 23, 2015 office visit:  Returns for evaluation and to discuss MRI results. Previous visit was reviewed. It is unchanged. Complains of significant weakness with overhead lifting type activities. Complains of mild to moderate pain at times. Describes it as achy. Nonradiating. She will be seen [facility] later today.  July 15, 2015 visit:  Presents the complaint of right shoulder and arm pain. She has been seen by [facility] for cervical spine pain. She had neck pain as long as 4 years ago. She received a injection at that point which resolved her symptoms. Pain return. She underwent a chiropractic treatment that helped her symptoms significantly. However started having more pain into the shoulder region. Pain is located anterolaterally. She also complains of weakness with overhead and reaching. She has taken Aleve and ibuprofen withimprovement. She feels the symptoms began approximately 2 weeks after falling ground level onto her shoulders and arms. Major complaint is the weakness she feels.         Patient's past medical, surgical,  social and family histories reviewed.     Past Medical History:   Diagnosis Date     Breast cancer (H) 2004    lumpectomy and 33 lymph nodes removed.     Shingles        Past Surgical History:   Procedure Laterality Date     COLONOSCOPY N/A 12/12/2014    Procedure: COLONOSCOPY;  Surgeon: Bandar Guidry MD;  [facility]     HYSTERECTOMY, PAP NO LONGER INDICATED       INJECT EPIDURAL CERVICAL N/A 9/23/2015    Procedure: INJECT EPIDURAL CERVICAL;  Surgeon: Sawyer Webster MD;  [facility]     INJECT EPIDURAL CERVICAL Right 11/8/2019    Procedure: Cervical 6-7 Spine Injection;  Surgeon: Sawyer Webster MD;  [facility]     INJECT EPIDURAL CERVICAL N/A 10/2/2020    Procedure: INJECTION, SPINE, CERVICAL 6-7, EPIDURAL;  Surgeon: Sawyer Webster MD;  [facility]     INJECT JOINT SACROILIAC Bilateral 10/24/2019    Procedure: Bilateral Sacroiliac Joint Injections;  Surgeon: Sawyer Webster MD;  [facility]     INJECT JOINT SACROILIAC Bilateral 9/24/2020    Procedure: Bilaterl sacroiliac joint injections;  Surgeon: Sawyer Webster MD;  [facility]     PHACOEMULSIFICATION WITH STANDARD INTRAOCULAR LENS IMPLANT Left 11/15/2018    Procedure: PHACOEMULSIFICATION WITH STANDARD INTRAOCULAR LENS IMPLANT LEFT EYE;  Surgeon: Rian Ford MD;  [facility]     PHACOEMULSIFICATION WITH STANDARD INTRAOCULAR LENS IMPLANT Right 11/29/2018    Procedure: PHACOEMULSIFICATION WITH STANDARD INTRAOCULAR LENS IMPLANT RIGHT EYE;  Surgeon: Rian Ford MD;  [facility]       Medications:  acetaminophen (TYLENOL) 325 MG tablet, Take 325-650 mg by mouth every 6 hours as needed  alendronate (FOSAMAX) 70 MG tablet, Take 1 tablet (70 mg) by mouth every 7 days  Cholecalciferol (VITAMIN D) 2000 UNITS tablet, Take 2,000 Units by mouth daily  gabapentin (NEURONTIN) 100 MG capsule, Take 1 capsule (100 mg) by mouth 2 times daily AND 3 capsules (300 mg) At Bedtime. Take 1 - 100 mg capsule in the morning.  Then 1 - 100 mg capsule after lunch. Then take 3 - 100 mg capsules at bedtime.  IBUPROFEN PO, Take 400 mg by mouth every 6 hours as needed for moderate pain  lisinopril (ZESTRIL) 10 MG tablet, Take 1 tablet by mouth once daily  Magnesium Oxide 250 MG TABS, Take 1 tablet by mouth daily Patient takes only every other day.  Multiple Vitamins-Minerals (MULTIVITAMIN OR),   Probiotic Product (PROBIOTIC-10 PO),   vitamin B complex with vitamin C (VITAMIN  B COMPLEX) tablet, Take 1 tablet by mouth daily    No current facility-administered medications on file prior to visit.       No Known Allergies    Social History     Occupational History     Not on file   Tobacco Use     Smoking status: Never Smoker     Smokeless tobacco: Never Used   Substance and Sexual Activity     Alcohol use: Yes     Comment:  1 glass of wine with dinner      Drug use: No     Sexual activity: Never       No family history on file.    REVIEW OF SYSTEMS  10 point review systems performed otherwise negative as noted as per history of present illness.    Physical Exam:  Vitals: /72   Ht 1.524 m (5')   Wt 50 kg (110 lb 3.2 oz)   LMP  (LMP Unknown)   BMI 21.52 kg/m    BMI= Body mass index is 21.52 kg/m .  Constitutional: healthy, alert and no acute distress   Psychiatric: mentation appears normal and affect normal/bright  NEURO: no focal deficits  RESP: Normal with easy respirations and no use of accessory muscles to breathe, no audible wheezing or retractions  CV: LUE:   no edema         Regular rate and rhythm by palpation  SKIN: No erythema, rashes, excoriation, or breakdown. No evidence of infection.   JOINT/EXTREMITIES:left shoulder: No gross deformity.  No focal areas of tenderness.  Positive James.  Active motion 165/165/30/T6.  She does have some pain weakness with supraspinatus testing.     GAIT: not tested     Diagnostic Modalities:  left shoulder X-ray: No fractures or dislocations.  There is some joint space narrowing glenohumeral  joint.  Associated with an osteophyte along the inferior humeral head.  Questionable intra-articular calcification noted on the Grashey view as well.  AC joint shows some irregularity and narrowing.  Axillary view shows some particular narrowing and irregularity along the posterior glenoid.  left shoulder MRI:    1. On a background of tendinosis, near full-thickness, bursal sided  tear of the anterior and middle fibers (11 mm AP dimension) of the  supraspinatus at the footprint.   a. No muscle bulk loss.  2. Tendinosis with at least partial thickness tearing of the  intra-articular portion of the long head of the biceps.  3. Severe glenohumeral osteoarthrosis and moderate to severe  osteoarthrosis of the acromioclavicular joint.  4. Subacromial/subdeltoid bursitis.  5. Suspected posterior and superior labral tearing.  6. Suspect ossified intra-articular body posterior superior glenoid.      Independent visualization of the images was performed.      Impression: left shoulder near full-thickness supraspinatus rotator cuff tendon tear with mild fatty infiltration  Left shoulder severe glenohumeral osteoarthritis  Left shoulder intra-articular long head biceps tendinosis with partial-thickness tearing    Plan:  All of the above pertinent physical exam and imaging modalities findings was reviewed with Coby.      We discussed her options.  We discussed steroid injections as well as physical therapy.  A physical therapy referral was placed today.  We also plan on proceeding with a steroid injection however she is getting her second Covid vaccine tomorrow.  We discussed seeing her approximately 2 weeks after this vaccination.  At that time she will return for a left shoulder steroid injection.    Return to clinic 2, week(s), or sooner as needed for changes.  Re-x-ray on return: No    Peewee Rogers D.O.

## 2021-04-05 ENCOUNTER — OFFICE VISIT (OUTPATIENT)
Dept: ORTHOPEDICS | Facility: CLINIC | Age: 81
End: 2021-04-05
Payer: COMMERCIAL

## 2021-04-05 VITALS
WEIGHT: 110.2 LBS | DIASTOLIC BLOOD PRESSURE: 60 MMHG | SYSTOLIC BLOOD PRESSURE: 120 MMHG | BODY MASS INDEX: 21.64 KG/M2 | HEIGHT: 60 IN

## 2021-04-05 DIAGNOSIS — M75.52 SUBACROMIAL BURSITIS OF LEFT SHOULDER JOINT: ICD-10-CM

## 2021-04-05 DIAGNOSIS — M75.102 TEAR OF LEFT SUPRASPINATUS TENDON: Primary | ICD-10-CM

## 2021-04-05 DIAGNOSIS — M19.012 GLENOHUMERAL ARTHRITIS, LEFT: ICD-10-CM

## 2021-04-05 PROCEDURE — 20610 DRAIN/INJ JOINT/BURSA W/O US: CPT | Mod: LT | Performed by: ORTHOPAEDIC SURGERY

## 2021-04-05 RX ORDER — TRIAMCINOLONE ACETONIDE 40 MG/ML
40 INJECTION, SUSPENSION INTRA-ARTICULAR; INTRAMUSCULAR ONCE
Status: COMPLETED | OUTPATIENT
Start: 2021-04-05 | End: 2021-04-05

## 2021-04-05 RX ADMIN — TRIAMCINOLONE ACETONIDE 40 MG: 40 INJECTION, SUSPENSION INTRA-ARTICULAR; INTRAMUSCULAR at 11:36

## 2021-04-05 ASSESSMENT — MIFFLIN-ST. JEOR: SCORE: 891.36

## 2021-04-05 ASSESSMENT — PAIN SCALES - GENERAL: PAINLEVEL: MILD PAIN (3)

## 2021-04-05 NOTE — LETTER
4/5/2021         RE: Coby Damon  [address] [place] [facility] [address]        Dear Colleague,    Thank you for referring your patient, Coby Damon, to the [facility]. Please see a copy of my visit note below.    Office Visit-Follow up    Chief Complaint: Coby Damon is a 81 year old female who is being seen for   Chief Complaint   Patient presents with     RECHECK      left shoulder near full-thickness supraspinatus rotator cuff tendon tear with mild fatty infiltration       History of Present Illness:   Today's visit:  Returns today for corticosteroid injection.  At her last visit she had her Covid vaccine in proximity so was delayed.  No changes.    March 17, 2021 visit:  Coby Damon is a 81 year old female who is seen in consultation at the request of Alexander for evaluation of left shoulder pain.     Pre-existing for a right shoulder issue back in 2015.  Today presents for a contralateral shoulder.  Complains of posterior lateral shoulder pain.  Describes it as achy and feels like he is guarding it.  Worse with reaching.  She feels like it began last November when she slipped and fell jarring her arm.  Nonradiating.  She feels like it flares a few times a week.  The last half a day impacting activity and life.  She is been taken some Tylenol and ibuprofen.     July 23, 2015 office visit:  Returns for evaluation and to discuss MRI results. Previous visit was reviewed. It is unchanged. Complains of significant weakness with overhead lifting type activities. Complains of mild to moderate pain at times. Describes it as achy. Nonradiating. She will be seen [facility] later today.  July 15, 2015 visit:  Presents the complaint of right shoulder and arm pain. She has been seen by [facility] for cervical spine pain. She had neck pain as long as 4 years ago. She received a injection at that point which resolved her symptoms. Pain return. She underwent a  chiropractic treatment that helped her symptoms significantly. However started having more pain into the shoulder region. Pain is located anterolaterally. She also complains of weakness with overhead and reaching. She has taken Aleve and ibuprofen withimprovement. She feels the symptoms began approximately 2 weeks after falling ground level onto her shoulders and arms. Major complaint is the weakness she feels    Social History     Occupational History     Not on file   Tobacco Use     Smoking status: Never Smoker     Smokeless tobacco: Never Used   Substance and Sexual Activity     Alcohol use: Yes     Comment:  1 glass of wine with dinner      Drug use: No     Sexual activity: Never       REVIEW OF SYSTEMS  General: negative for, night sweats, dizziness, fatigue  Resp: No shortness of breath and no cough  CV: negative for chest pain, syncope or near-syncope  GI: negative for nausea, vomiting and diarrhea  : negative for dysuria and hematuria  Musculoskeletal: as above  Neurologic: negative for syncope   Hematologic: negative for bleeding disorder    Physical Exam:  Vitals: /60   Ht 1.524 m (5')   Wt 50 kg (110 lb 3.2 oz)   LMP  (LMP Unknown)   BMI 21.52 kg/m    BMI= Body mass index is 21.52 kg/m .  Constitutional: healthy, alert and no acute distress   Psychiatric: mentation appears normal and affect normal/bright  NEURO: no focal deficits  RESP: Normal with easy respirations and no use of accessory muscles to breathe, no audible wheezing or retractions  CV: LUE:   no edema           SKIN: No erythema, rashes, excoriation, or breakdown. No evidence of infection.   JOINT/EXTREMITIES:left shoulder: No evidence of infection.  No skin changes  GAIT:             Diagnostic Modalities:  None today.  Independent visualization of the images was performed.      Impression: left shoulder near full-thickness supraspinatus rotator cuff tendon tear with mild fatty infiltration  Left shoulder severe glenohumeral  osteoarthritis  Left shoulder intra-articular long head biceps tendinosis with partial-thickness tearing       Plan:  All of the above pertinent physical exam and imaging modalities findings was reviewed with Coby.                                          INJECTION PROCEDURE:  The patient was counseled about an  injection, including discussion of risks (including infection), contents of the injection, rationale for performing the injection, and expected benefits of the injection. The skin was prepped with alcohol and betadine and then utilizing sterile technique an injection of the left shoulder subacromial space from the posterolateral approach  was performed. The injection consisted 1ml of Kenalog (40mg per 1 ml) mixed with 3ml of 0.5% Marcaine. The patient tolerated the injection well, and there were no complications. The injection site was covered with a Band-Aid. The injection was performed by CRISTIN Santoyo      She is a couple weeks from the Covid vaccination.  No issues.  We will proceed with injection today.    Return to clinic PRN, or sooner as needed for changes.  Re-x-ray on return: No    Peewee Rogers D.O.          Clinic Administered Medication Documentation      Injection Documentation     Injection was administered by provider (please see MAR for given by information). Please see MAR and medication order for additional information.     Site: Joint injection   Medication Used: Kenalog 40mg   Expiration Date:  10/2022      The following medication was given by Eugene Mccallum PA-C:     MEDICATION: Kenalog 40mg/1ml  ROUTE: Joint Injection  SITE: left shoulder  DOSE: 1 mL  LOT #: [id number]  : [employer]  EXPIRATION DATE:  10/2022  NDC: [address]-8                        Again, thank you for allowing me to participate in the care of your patient.        Sincerely,        Zain Rogers DO

## 2021-04-05 NOTE — PROGRESS NOTES
Office Visit-Follow up    Chief Complaint: Coby Damon is a 81 year old female who is being seen for   Chief Complaint   Patient presents with     RECHECK      left shoulder near full-thickness supraspinatus rotator cuff tendon tear with mild fatty infiltration       History of Present Illness:   Today's visit:  Returns today for corticosteroid injection.  At her last visit she had her Covid vaccine in proximity so was delayed.  No changes.    March 17, 2021 visit:  Coby Damon is a 81 year old female who is seen in consultation at the request of Alexander for evaluation of left shoulder pain.     Pre-existing for a right shoulder issue back in 2015.  Today presents for a contralateral shoulder.  Complains of posterior lateral shoulder pain.  Describes it as achy and feels like he is guarding it.  Worse with reaching.  She feels like it began last November when she slipped and fell jarring her arm.  Nonradiating.  She feels like it flares a few times a week.  The last half a day impacting activity and life.  She is been taken some Tylenol and ibuprofen.     July 23, 2015 office visit:  Returns for evaluation and to discuss MRI results. Previous visit was reviewed. It is unchanged. Complains of significant weakness with overhead lifting type activities. Complains of mild to moderate pain at times. Describes it as achy. Nonradiating. She will be seen [facility] later today.  July 15, 2015 visit:  Presents the complaint of right shoulder and arm pain. She has been seen by [facility] for cervical spine pain. She had neck pain as long as 4 years ago. She received a injection at that point which resolved her symptoms. Pain return. She underwent a chiropractic treatment that helped her symptoms significantly. However started having more pain into the shoulder region. Pain is located anterolaterally. She also complains of weakness with overhead and reaching. She has taken Aleve and ibuprofen  withimprovement. She feels the symptoms began approximately 2 weeks after falling ground level onto her shoulders and arms. Major complaint is the weakness she feels    Social History     Occupational History     Not on file   Tobacco Use     Smoking status: Never Smoker     Smokeless tobacco: Never Used   Substance and Sexual Activity     Alcohol use: Yes     Comment:  1 glass of wine with dinner      Drug use: No     Sexual activity: Never       REVIEW OF SYSTEMS  General: negative for, night sweats, dizziness, fatigue  Resp: No shortness of breath and no cough  CV: negative for chest pain, syncope or near-syncope  GI: negative for nausea, vomiting and diarrhea  : negative for dysuria and hematuria  Musculoskeletal: as above  Neurologic: negative for syncope   Hematologic: negative for bleeding disorder    Physical Exam:  Vitals: /60   Ht 1.524 m (5')   Wt 50 kg (110 lb 3.2 oz)   LMP  (LMP Unknown)   BMI 21.52 kg/m    BMI= Body mass index is 21.52 kg/m .  Constitutional: healthy, alert and no acute distress   Psychiatric: mentation appears normal and affect normal/bright  NEURO: no focal deficits  RESP: Normal with easy respirations and no use of accessory muscles to breathe, no audible wheezing or retractions  CV: LUE:   no edema           SKIN: No erythema, rashes, excoriation, or breakdown. No evidence of infection.   JOINT/EXTREMITIES:left shoulder: No evidence of infection.  No skin changes  GAIT:             Diagnostic Modalities:  None today.  Independent visualization of the images was performed.      Impression: left shoulder near full-thickness supraspinatus rotator cuff tendon tear with mild fatty infiltration  Left shoulder severe glenohumeral osteoarthritis  Left shoulder intra-articular long head biceps tendinosis with partial-thickness tearing       Plan:  All of the above pertinent physical exam and imaging modalities findings was reviewed with Mendez                                           INJECTION PROCEDURE:  The patient was counseled about an  injection, including discussion of risks (including infection), contents of the injection, rationale for performing the injection, and expected benefits of the injection. The skin was prepped with alcohol and betadine and then utilizing sterile technique an injection of the left shoulder subacromial space from the posterolateral approach  was performed. The injection consisted 1ml of Kenalog (40mg per 1 ml) mixed with 3ml of 0.5% Marcaine. The patient tolerated the injection well, and there were no complications. The injection site was covered with a Band-Aid. The injection was performed by CRISTIN Santoyo      She is a couple weeks from the Covid vaccination.  No issues.  We will proceed with injection today.    Return to clinic PRN, or sooner as needed for changes.  Re-x-ray on return: No    Peewee Rogers D.O.

## 2021-04-05 NOTE — PROGRESS NOTES
Clinic Administered Medication Documentation      Injection Documentation     Injection was administered by provider (please see MAR for given by information). Please see MAR and medication order for additional information.     Site: Joint injection   Medication Used: Kenalog 40mg   Expiration Date:  10/2022      The following medication was given by Eugene Mccallum PA-C:     MEDICATION: Kenalog 40mg/1ml  ROUTE: Joint Injection  SITE: left shoulder  DOSE: 1 mL  LOT #: [id number]  : [employer]  EXPIRATION DATE:  10/2022  NDC: [address]-8

## 2021-04-06 ENCOUNTER — HOSPITAL ENCOUNTER (OUTPATIENT)
Dept: PHYSICAL THERAPY | Facility: CLINIC | Age: 81
Setting detail: THERAPIES SERIES
End: 2021-04-06
Attending: ORTHOPAEDIC SURGERY
Payer: COMMERCIAL

## 2021-04-06 DIAGNOSIS — M75.102 TEAR OF LEFT SUPRASPINATUS TENDON: ICD-10-CM

## 2021-04-06 DIAGNOSIS — M19.012 GLENOHUMERAL ARTHRITIS, LEFT: ICD-10-CM

## 2021-04-06 PROCEDURE — 97161 PT EVAL LOW COMPLEX 20 MIN: CPT | Mod: GP | Performed by: PHYSICAL THERAPIST

## 2021-04-06 PROCEDURE — 97110 THERAPEUTIC EXERCISES: CPT | Mod: GP | Performed by: PHYSICAL THERAPIST

## 2021-04-06 NOTE — PROGRESS NOTES
04/06/21 1400   General Information   Type of Visit Initial OP Ortho PT Evaluation   Start of Care Date 04/06/21   Referring Physician kody watts DO    Patient/Family Goals Statement left shoulder pain    Orders Evaluate and Treat   Date of Order 03/17/21   Certification Required? Yes   Medical Diagnosis tear of left supraspinatus tendon M75.102 glenohumeral arthisits M19.012   Surgical/Medical history reviewed Yes   Precautions/Limitations no known precautions/limitations   Body Part(s)   Body Part(s) Shoulder   Presentation and Etiology   Pertinent history of current problem (include personal factors and/or comorbidities that impact the POC) patient reports she injured left shoulder just before thanks giving  was having pain and loss of ROM . had injection yesterday and doing much better . PMH had right shoulder injury years ago , is right handed , chonic pain in low back scoliosis    Impairments A. Pain;F. Decreased strength and endurance   Functional Limitations perform activities of daily living;perform desired leisure / sports activities   Symptom Location left shoulder    How/Where did it occur With a fall   Onset date of current episode/exacerbation 11/06/20   Chronicity Chronic   Pain rating (0-10 point scale) Best (/10);Worst (/10)   Best (/10) 0/10   Worst (/10) 1/10   Pain quality B. Dull   Frequency of pain/symptoms C. With activity   Pain/symptoms exacerbated by G. Certain positions   Pain/symptoms eased by C. Rest   Progression of symptoms since onset: Improved   Prior Level of Function   Functional Level Prior Comment house work , 2 lbs circles and biceps    Current Level of Function   Current Community Support Family/friend caregiver   Patient role/employment history F. Retired   Fall Risk Screen   Fall screen completed by PT   Have you fallen 2 or more times in the past year? No   Have you fallen and had an injury in the past year? No   Timed Up and Go score (seconds) less than 13 seconds    Is  patient a fall risk? No   Fall screen comments missed step on ladder and slid down doorway    Abuse Screen (yes response referral indicated)   Feels Unsafe at Home or Work/School no   Feels Threatened by Someone no   Does Anyone Try to Keep You From Having Contact with Others or Doing Things Outside Your Home? no   Physical Signs of Abuse Present no   Shoulder Objective Findings   Side (if bilateral, select both right and left) Left   Shoulder ROM Comment left shoulder AROM is slightly less due to having lymph nodes removed    Shoulder Flexibility Comments shoulder strength good 4-/5 shoulder flexion , extension , abd, internal and external rotation , elbow flexion, extension    Palpation patient with crepitis in left shoulder pain with cross over    Planned Therapy Interventions   Planned Therapy Interventions ADL retraining;ROM;strengthening;stretching   Clinical Impression   Criteria for Skilled Therapeutic Interventions Met yes, treatment indicated   PT Diagnosis left shoulder pain and limited function due to OA and rotator cuff tear   Functional limitations due to impairments patient since injection is 90% back to normal    Clinical Presentation Stable/Uncomplicated   Clinical Presentation Rationale patient seen due to left shoulder pain and limited function due to OA and rotator cuff tear, Rx is skilled PT instruction in HEP for exercises and strengthening    Clinical Decision Making (Complexity) Low complexity   Predicted Duration of Therapy Intervention (days/wks) recheck in 2 weeks , 4 Rx over 2-3 months if needed    Risk & Benefits of therapy have been explained Yes   Patient, Family & other staff in agreement with plan of care Yes   Education Assessment   Preferred Learning Style Listening;Reading;Demonstration   Barriers to Learning No barriers   ORTHO GOALS   PT Ortho Eval Goals 1;2   Ortho Goal 1   Goal Identifier 1   Goal Description instruction in HEP and compliant with it 5 of 7 days to improve  quality of life    Target Date 07/06/21   Ortho Goal 2   Goal Identifier 2   Goal Description patient to return to prior level of function and pain level    Target Date 07/06/21   Total Evaluation Time   PT Eval, Low Complexity Minutes (38043) 10   Therapy Certification   Certification date from 04/06/21   Certification date to 07/06/21   Medical Diagnosis tear of left supraspinatus tendon M75.102 glenohumeral arthisits M19.012

## 2021-04-06 NOTE — PROGRESS NOTES
[facility]          OUTPATIENT PHYSICAL THERAPY ORTHOPEDIC EVALUATION  PLAN OF TREATMENT FOR OUTPATIENT REHABILITATION  (COMPLETE FOR INITIAL CLAIMS ONLY)  Patient's Last Name, First Name, M.I.  YOB: 1940  Coby Damon    Provider s Name:  [facility]   Medical Record No.  [id number]   Start of Care Date:  04/06/21   Onset Date:  11/06/20   Type:     _X__PT   ___OT   ___SLP Medical Diagnosis:  tear of left supraspinatus tendon M75.102 glenohumeral arthisits M19.012     PT Diagnosis:  left shoulder pain and limited function due to OA and rotator cuff tear   Visits from SOC:  1      _________________________________________________________________________________  Plan of Treatment/Functional Goals:  ADL retraining, ROM, strengthening, stretching           Goals  Goal Identifier: 1  Goal Description: instruction in HEP and compliant with it 5 of 7 days to improve quality of life   Target Date: 07/06/21    Goal Identifier: 2  Goal Description: patient to return to prior level of function and pain level   Target Date: 07/06/21                                                                      Therapy Frequency:     Predicted Duration of Therapy Intervention:  recheck in 2 weeks , 4 Rx over 2-3 months if needed     Claudia Bynum, PT                 I CERTIFY THE NEED FOR THESE SERVICES FURNISHED UNDER        THIS PLAN OF TREATMENT AND WHILE UNDER MY CARE     (Physician co-signature of this document indicates review and certification of the therapy plan).                       Certification Date From:  04/06/21   Certification Date To:  07/06/21    Referring Provider:  kody watts DO     Initial Assessment        See Epic Evaluation Start of Care Date: 04/06/21

## 2021-04-21 ENCOUNTER — HOSPITAL ENCOUNTER (OUTPATIENT)
Dept: PHYSICAL THERAPY | Facility: CLINIC | Age: 81
Setting detail: THERAPIES SERIES
End: 2021-04-21
Attending: ORTHOPAEDIC SURGERY
Payer: COMMERCIAL

## 2021-04-21 PROCEDURE — 97110 THERAPEUTIC EXERCISES: CPT | Mod: GP | Performed by: PHYSICAL THERAPIST

## 2021-05-11 NOTE — PROGRESS NOTES
Outpatient Physical Therapy Discharge Note     Patient: Coby Damon  : 1940    Beginning/End Dates of Reporting Period:  2021 to 2021    Referring Provider: kody Kathleen Diagnosis: shoulder pain      Client Self Report: barely knows she has any limit or loss     Objective Measurements:   patient seen for 2 Rx sessions for exercises in clinic and instruction in HEP at last Rx she was completing the following   2 lbs supine press up , with flexion/extension , side internal and external rotation , sitting elbow flexion and shoulder abduction partial range 10x , wall push up 10x and wall lift off x 10 x, added ball toss head level and above head x 10        Goals:  Goal Identifier 1   Goal Description instruction in HEP and compliant with it 5 of 7 days to improve quality of life    Target Date 21   Date Met      Progress:     Goal Identifier 2   Goal Description patient to return to prior level of function and pain level    Target Date 21   Date Met      Progress:       Progress Toward Goals:   Progress this reporting period: patient is very compliant with HEP and is meeting all her therapy goals           Plan:  Discharge from therapy.    Discharge:    Reason for Discharge: Patient has met all goals.    Equipment Issued: none    Discharge Plan: Patient to continue home program.

## 2021-05-25 ENCOUNTER — VIRTUAL VISIT (OUTPATIENT)
Dept: FAMILY MEDICINE | Facility: CLINIC | Age: 81
End: 2021-05-25
Payer: COMMERCIAL

## 2021-05-25 DIAGNOSIS — M54.40 LOW BACK PAIN WITH SCIATICA, SCIATICA LATERALITY UNSPECIFIED, UNSPECIFIED BACK PAIN LATERALITY, UNSPECIFIED CHRONICITY: ICD-10-CM

## 2021-05-25 DIAGNOSIS — M46.1 SACROILIITIS, NOT ELSEWHERE CLASSIFIED (H): ICD-10-CM

## 2021-05-25 PROCEDURE — 99442 PR PHYSICIAN TELEPHONE EVALUATION 11-20 MIN: CPT | Mod: 95 | Performed by: NURSE PRACTITIONER

## 2021-05-25 RX ORDER — GABAPENTIN 100 MG/1
CAPSULE ORAL
Qty: 450 CAPSULE | Refills: 1 | Status: SHIPPED | OUTPATIENT
Start: 2021-05-25 | End: 2023-04-27

## 2021-05-25 NOTE — PROGRESS NOTES
Coby is a 81 year old who is being evaluated via a billable telephone visit.      What phone number would you like to be contacted at? [phone]  How would you like to obtain your AVS? Re[place]hart    Assessment & Plan     Low back pain with sciatica, sciatica laterality unspecified, unspecified back pain laterality, unspecified chronicity  - She is using the Tylenol 2-3 times daily with an occasional ibuprofen and the gabapentin as directed.   - She is able to perform all normal daily activities with pain fairly well controlled.   - She wants to see Dr. Will in the fall to discuss further injections, wants to hold off for now.   - No worsening symptoms once we discussed during our visit. She is very stable. No new weakness or incontinence. She will call if this changes.   - gabapentin (NEURONTIN) 100 MG capsule; Take 1 capsule (100 mg) by mouth 2 times daily AND 3 capsules (300 mg) At Bedtime. Take 1 - 100 mg capsule in the morning. Then 1 - 100 mg capsule after lunch. Then take 3 - 100 mg capsules at bedtime.    Sacroiliitis, not elsewhere classified (H)  - Stable, this is chronic.   - Injections with Dr. Webster due help but wants to hold off until fall to discuss with Dr. Will, she states symptoms are controlled enough for her to complete her normal daily routine and activities.         15  minutes spent on the date of the encounter doing chart review, history and exam, documentation and further activities per the note           Return in about 3 months (around 9/1/2021), or Discuss back pain and possible injections with Dr. Webster.    Alexis Roe NP  [facility]   Coby is a 81 year old who presents for the following health issues     HPI     Chronic/Recurring Back Pain Follow Up      Where is your back pain located? (Select all that apply) low back bilateral    How would you describe your back pain?  burning and dull ache    Where does your back pain  spread? Down hips    Since your last clinic visit for back pain, how has your pain changed? unchanged or a little worse    Does your back pain interfere with your job? Not applicable    Since your last visit, have you tried any new treatment? No      How many servings of fruits and vegetables do you eat daily?  2-3    On average, how many sweetened beverages do you drink each day (Examples: soda, juice, sweet tea, etc.  Do NOT count diet or artificially sweetened beverages)?   0    How many days per week do you exercise enough to make your heart beat faster? 3 or less    How many minutes a day do you exercise enough to make your heart beat faster? 9 or less    How many days per week do you miss taking your medication? 0        Review of Systems   Constitutional, HEENT, cardiovascular, pulmonary, gi and gu systems are negative, except as otherwise noted.      Objective    Vitals - Patient Reported  Weight (Patient Reported): 49.9 kg (110 lb)        Physical Exam   healthy, alert and no distress  PSYCH: Alert and oriented times 3; coherent speech, normal   rate and volume, able to articulate logical thoughts, able   to abstract reason, no tangential thoughts, no hallucinations   or delusions  Her affect is normal  RESP: No cough, no audible wheezing, able to talk in full sentences  Remainder of exam unable to be completed due to telephone visits    Imaging and labs reviewed in EPIC            Phone call duration: 11 minutes

## 2021-05-25 NOTE — PATIENT INSTRUCTIONS
- Continue taking the Tylenol 2-3 times daily with the gabapentin as we discussed.     - You can use the occasional Ibuprofen as needed on busy days.     - Plan to have you follow up with Dr. Will in September to decide if you want to pursue injections this fall.     Call sooner for concerns.

## 2021-06-04 DIAGNOSIS — I10 HYPERTENSION GOAL BP (BLOOD PRESSURE) < 140/90: ICD-10-CM

## 2021-06-04 RX ORDER — LISINOPRIL 10 MG/1
TABLET ORAL
Qty: 90 TABLET | Refills: 0 | Status: SHIPPED | OUTPATIENT
Start: 2021-06-04 | End: 2021-09-02

## 2021-06-04 NOTE — TELEPHONE ENCOUNTER
Prescription approved per [facility] Refill Protocol.    Sarai Moss RN on 6/4/2021 at 3:24 PM

## 2021-09-02 DIAGNOSIS — I10 HYPERTENSION GOAL BP (BLOOD PRESSURE) < 140/90: ICD-10-CM

## 2021-09-07 RX ORDER — LISINOPRIL 10 MG/1
10 TABLET ORAL DAILY
Qty: 90 TABLET | Refills: 0 | Status: SHIPPED | OUTPATIENT
Start: 2021-09-07 | End: 2021-12-08

## 2021-09-08 ENCOUNTER — OFFICE VISIT (OUTPATIENT)
Dept: FAMILY MEDICINE | Facility: CLINIC | Age: 81
End: 2021-09-08
Payer: COMMERCIAL

## 2021-09-08 VITALS
BODY MASS INDEX: 21.2 KG/M2 | WEIGHT: 108 LBS | OXYGEN SATURATION: 96 % | RESPIRATION RATE: 16 BRPM | TEMPERATURE: 98.1 F | DIASTOLIC BLOOD PRESSURE: 74 MMHG | HEART RATE: 76 BPM | SYSTOLIC BLOOD PRESSURE: 126 MMHG | HEIGHT: 60 IN

## 2021-09-08 DIAGNOSIS — M54.40 CHRONIC BILATERAL LOW BACK PAIN WITH SCIATICA, SCIATICA LATERALITY UNSPECIFIED: Primary | ICD-10-CM

## 2021-09-08 DIAGNOSIS — M41.9 SCOLIOSIS OF THORACOLUMBAR SPINE, UNSPECIFIED SCOLIOSIS TYPE: ICD-10-CM

## 2021-09-08 DIAGNOSIS — G89.29 CHRONIC BILATERAL LOW BACK PAIN WITH SCIATICA, SCIATICA LATERALITY UNSPECIFIED: Primary | ICD-10-CM

## 2021-09-08 PROCEDURE — 99213 OFFICE O/P EST LOW 20 MIN: CPT | Performed by: FAMILY MEDICINE

## 2021-09-08 ASSESSMENT — MIFFLIN-ST. JEOR: SCORE: 881.38

## 2021-09-08 ASSESSMENT — PAIN SCALES - GENERAL: PAINLEVEL: MODERATE PAIN (5)

## 2021-09-08 NOTE — PROGRESS NOTES
Assessment & Plan     Chronic bilateral low back pain with sciatica, sciatica laterality unspecified  Wants to entertain the idea of having further epidural injections in the low back.  I would like to order an MRI of her low back as we do not even have one on the chart.  She states she had bilateral sacroiliac injections about a year ago but they did not help much.  We will follow-up after the MRI and probably set her up to see neurosurgery for advice on injections.  - MR Lumbar Spine w/o Contrast; Future    Scoliosis of thoracolumbar spine, unspecified scoliosis type  Chronic situation.  She is taken Tylenol and she does take some ibuprofen quit taking her Neurontin after years because she did not feel it was doing much and she really does not notice any difference being off it.  So for now we will keep her off this.  - MR Lumbar Spine w/o Contrast; Future                 No follow-ups on file.    Bandar Will MD  [facility]    Chloe Tenorio is a 81 year old who presents for the following health issues: Chronic low back pain.  She has severe scoliosis.  Has had trouble with her spine both cervical and lower back for years.  Has had epidural injections in both areas.  We will put radiation down into her buttocks and hips bilaterally.  Hard for her to straighten up at times.  She had bilateral sacroiliac injections about a year ago and helped for several months.  She is seeking advice on further injections.    HPI     Chronic/Recurring Back Pain Follow Up      Where is your back pain located? (Select all that apply) hip bilateral    How would you describe your back pain?  burning and dull ache    Where does your back pain spread? nowhere    Since your last clinic visit for back pain, how has your pain changed? gradually worsening    Does your back pain interfere with your job? Not applicable    Since your last visit, have you tried any new treatment? No shots in shoulders        How many servings of fruits and vegetables do you eat daily?  2    On average, how many sweetened beverages do you drink each day (Examples: soda, juice, sweet tea, etc.  Do NOT count diet or artificially sweetened beverages)?   0    How many days per week do you exercise enough to make your heart beat faster? 7    How many minutes a day do you exercise enough to make your heart beat faster? 20 - 29    How many days per week do you miss taking your medication? 0        Review of Systems   Constitutional, HEENT, cardiovascular, pulmonary, gi and gu systems are negative, except as otherwise noted.      Objective    /74   Pulse 76   Temp 98.1  F (36.7  C) (Temporal)   Resp 16   Ht 1.524 m (5')   Wt 49 kg (108 lb)   LMP  (LMP Unknown)   SpO2 96%   BMI 21.09 kg/m    Body mass index is 21.09 kg/m .  Physical Exam   GENERAL: healthy, alert and no distress  NECK: no adenopathy, no asymmetry, masses, or scars and thyroid normal to palpation  MS: Marked scoliosis noted in the back.  Some tenderness in the lower lumbar spine and into the buttocks bilaterally.  Gait is fairly good some kyphosis.  NEURO: Normal strength and tone, mentation intact and speech normal  PSYCH: mentation appears normal, affect normal/bright

## 2021-09-14 ENCOUNTER — TELEPHONE (OUTPATIENT)
Dept: FAMILY MEDICINE | Facility: CLINIC | Age: 81
End: 2021-09-14

## 2021-09-14 ENCOUNTER — HOSPITAL ENCOUNTER (OUTPATIENT)
Dept: MRI IMAGING | Facility: CLINIC | Age: 81
Discharge: HOME | End: 2021-09-14
Attending: FAMILY MEDICINE | Admitting: FAMILY MEDICINE
Payer: COMMERCIAL

## 2021-09-14 DIAGNOSIS — G89.29 CHRONIC BILATERAL LOW BACK PAIN WITH SCIATICA, SCIATICA LATERALITY UNSPECIFIED: ICD-10-CM

## 2021-09-14 DIAGNOSIS — M54.40 CHRONIC BILATERAL LOW BACK PAIN WITH SCIATICA, SCIATICA LATERALITY UNSPECIFIED: ICD-10-CM

## 2021-09-14 DIAGNOSIS — M54.40 LOW BACK PAIN WITH SCIATICA, SCIATICA LATERALITY UNSPECIFIED, UNSPECIFIED BACK PAIN LATERALITY, UNSPECIFIED CHRONICITY: Primary | ICD-10-CM

## 2021-09-14 DIAGNOSIS — M41.9 SCOLIOSIS OF THORACOLUMBAR SPINE, UNSPECIFIED SCOLIOSIS TYPE: ICD-10-CM

## 2021-09-14 PROCEDURE — 72148 MRI LUMBAR SPINE W/O DYE: CPT

## 2021-09-14 NOTE — TELEPHONE ENCOUNTER
----- Message from Bandar Will MD sent at 9/14/2021 12:40 PM CDT -----  Let her know that I reviewed her MRI and had like her set up to see neurosurgery in consult for consideration of next step if injections at different levels would be appropriate.

## 2021-09-17 ENCOUNTER — TELEPHONE (OUTPATIENT)
Dept: FAMILY MEDICINE | Facility: CLINIC | Age: 81
End: 2021-09-17

## 2021-09-17 NOTE — TELEPHONE ENCOUNTER
Patient called in to get results. Advised referral sent over for neurosurgery consult.. trans. To scheduling. closing

## 2021-09-20 ENCOUNTER — TELEPHONE (OUTPATIENT)
Dept: PALLIATIVE MEDICINE | Facility: CLINIC | Age: 81
End: 2021-09-20

## 2021-09-20 ENCOUNTER — OFFICE VISIT (OUTPATIENT)
Dept: NEUROSURGERY | Facility: CLINIC | Age: 81
End: 2021-09-20
Payer: COMMERCIAL

## 2021-09-20 VITALS
TEMPERATURE: 98.2 F | SYSTOLIC BLOOD PRESSURE: 116 MMHG | WEIGHT: 109.4 LBS | DIASTOLIC BLOOD PRESSURE: 64 MMHG | BODY MASS INDEX: 20.65 KG/M2 | HEIGHT: 61 IN

## 2021-09-20 DIAGNOSIS — M53.3 SI (SACROILIAC) JOINT DYSFUNCTION: Primary | ICD-10-CM

## 2021-09-20 PROCEDURE — 99213 OFFICE O/P EST LOW 20 MIN: CPT | Performed by: PHYSICIAN ASSISTANT

## 2021-09-20 ASSESSMENT — MIFFLIN-ST. JEOR: SCORE: 903.62

## 2021-09-20 NOTE — LETTER
2021         RE: Coby Damon  [address] [facility] [address]        Dear Colleague,    Thank you for referring your patient, Coby Damon, to the [facility] [place]. Please see a copy of my visit note below.    Neurosurgery Clinic  Neurosurgery followup:    HPI:  She is following up today in regards to her low back pain.  She received injections into her bilateral SI joints over a year ago, with good symptomatic improvement.  She now notes that her bilateral low back pain has returned.  This does radiate into her hips bilaterally.  There was a new MRI performed, which does not show any dramatic changes compared to prior imaging.      Exam:  Constitutional:  Alert, well nourished, NAD.  HEENT: Normocephalic, atraumatic.   Pulm:  Without shortness of breath   CV:  No pitting edema of BLE.      Neurological:  Awake  Alert  Oriented x 3  Motor exam:        IP Q DF PF EHL  R   5  5   5   5    5  L   5  5   5   5    5     Reflexes are 2+ in the patellar and Achilles. There is no clonus. Downgoing Babinski.      Able to spontaneously move L/E bilaterally  Sensation intact throughout all L/E dermatomes         Imagin. Moderate to severe rightward convex scoliosis.  2. Multilevel degenerative disc and facet disease most advanced at  L3-L4 where there is moderate central canal stenosis and moderate to  severe bilateral neural foraminal stenoses. There is also moderate to  severe left-sided foraminal stenosis at L2-L3 and moderate to severe  right-sided foraminal stenosis at L4-L5 and L5-S1.    A/P:    Low back pain  Bilateral SI joint dysfunction  I did have a discussion with patient regarding her symptoms.  She has had a return of bilateral low back pain.  She did respond positively to bilateral SI joint injections over a year ago.  She would like to repeat these injections.  I think this is reasonable.  We discussed her MRI as well, which seems to be mostly  "unchanged.  We will see how she does with the SI joint injections this time around.  She voiced agreement and understanding.        Joaquín Aguilar PA-C  [facility]  [address] [address]    Tel [phone]  Pager [phone]      The use of Dragon/Kidizen dictation services may have been used to construct the content in this note; any grammatical or spelling errors are non-intentional. Please contact the author of this note directly if you are in need of any clarification.      Coby Damon is a 81 year old female who presents for:  Chief Complaint   Patient presents with     Neurologic Problem     Lumbar pain         Initial Vitals:  /64   Temp 98.2  F (36.8  C) (Temporal)   Ht 5' 1\" (1.549 m)   Wt 109 lb 6.4 oz (49.6 kg)   LMP  (LMP Unknown)   BMI 20.67 kg/m   Estimated body mass index is 20.67 kg/m  as calculated from the following:    Height as of this encounter: 5' 1\" (1.549 m).    Weight as of this encounter: 109 lb 6.4 oz (49.6 kg).. Body surface area is 1.46 meters squared. BP completed using cuff size: regular  Data Unavailable    Nursing Comments:     Chayito Gomez      Again, thank you for allowing me to participate in the care of your patient.        Sincerely,        Joaquín Aguialr PA-C    "

## 2021-09-20 NOTE — PROGRESS NOTES
Neurosurgery Clinic  Neurosurgery followup:    HPI:  She is following up today in regards to her low back pain.  She received injections into her bilateral SI joints over a year ago, with good symptomatic improvement.  She now notes that her bilateral low back pain has returned.  This does radiate into her hips bilaterally.  There was a new MRI performed, which does not show any dramatic changes compared to prior imaging.      Exam:  Constitutional:  Alert, well nourished, NAD.  HEENT: Normocephalic, atraumatic.   Pulm:  Without shortness of breath   CV:  No pitting edema of BLE.      Neurological:  Awake  Alert  Oriented x 3  Motor exam:        IP Q DF PF EHL  R   5  5   5   5    5  L   5  5   5   5    5     Reflexes are 2+ in the patellar and Achilles. There is no clonus. Downgoing Babinski.      Able to spontaneously move L/E bilaterally  Sensation intact throughout all L/E dermatomes         Imagin. Moderate to severe rightward convex scoliosis.  2. Multilevel degenerative disc and facet disease most advanced at  L3-L4 where there is moderate central canal stenosis and moderate to  severe bilateral neural foraminal stenoses. There is also moderate to  severe left-sided foraminal stenosis at L2-L3 and moderate to severe  right-sided foraminal stenosis at L4-L5 and L5-S1.    A/P:    Low back pain  Bilateral SI joint dysfunction  I did have a discussion with patient regarding her symptoms.  She has had a return of bilateral low back pain.  She did respond positively to bilateral SI joint injections over a year ago.  She would like to repeat these injections.  I think this is reasonable.  We discussed her MRI as well, which seems to be mostly unchanged.  We will see how she does with the SI joint injections this time around.  She voiced agreement and understanding.        Joaquín BRODERICK [facility]  [address]  [address] [place] [id number]    Tel  [phone]  Pager [phone]      The use of Dragon/[employer] dictation services may have been used to construct the content in this note; any grammatical or spelling errors are non-intentional. Please contact the author of this note directly if you are in need of any clarification.

## 2021-09-20 NOTE — PROGRESS NOTES
"Coby Damon is a 81 year old female who presents for:  Chief Complaint   Patient presents with     Neurologic Problem     Lumbar pain         Initial Vitals:  /64   Temp 98.2  F (36.8  C) (Temporal)   Ht 5' 1\" (1.549 m)   Wt 109 lb 6.4 oz (49.6 kg)   LMP  (LMP Unknown)   BMI 20.67 kg/m   Estimated body mass index is 20.67 kg/m  as calculated from the following:    Height as of this encounter: 5' 1\" (1.549 m).    Weight as of this encounter: 109 lb 6.4 oz (49.6 kg).. Body surface area is 1.46 meters squared. BP completed using cuff size: regular  Data Unavailable    Nursing Comments:     Chayito Gomez  "

## 2021-09-21 NOTE — TELEPHONE ENCOUNTER
Pt scheduled for [facility]  Date: 10/15/21  Time: 0830   Dr. Webster    Instructed pt to have H&P and  for procedure.  Patient informed of COVID testing process.

## 2021-09-22 DIAGNOSIS — Z11.59 ENCOUNTER FOR SCREENING FOR OTHER VIRAL DISEASES: ICD-10-CM

## 2021-10-03 ENCOUNTER — HEALTH MAINTENANCE LETTER (OUTPATIENT)
Age: 81
End: 2021-10-03

## 2021-10-07 ENCOUNTER — OFFICE VISIT (OUTPATIENT)
Dept: FAMILY MEDICINE | Facility: CLINIC | Age: 81
End: 2021-10-07
Payer: COMMERCIAL

## 2021-10-07 VITALS
RESPIRATION RATE: 16 BRPM | DIASTOLIC BLOOD PRESSURE: 64 MMHG | TEMPERATURE: 97.8 F | HEART RATE: 85 BPM | BODY MASS INDEX: 20.73 KG/M2 | WEIGHT: 109.7 LBS | OXYGEN SATURATION: 94 % | SYSTOLIC BLOOD PRESSURE: 112 MMHG

## 2021-10-07 DIAGNOSIS — Z01.818 PREOP GENERAL PHYSICAL EXAM: Primary | ICD-10-CM

## 2021-10-07 DIAGNOSIS — M54.40 ACUTE BILATERAL LOW BACK PAIN WITH SCIATICA, SCIATICA LATERALITY UNSPECIFIED: ICD-10-CM

## 2021-10-07 DIAGNOSIS — Z23 NEED FOR PROPHYLACTIC VACCINATION AND INOCULATION AGAINST INFLUENZA: ICD-10-CM

## 2021-10-07 PROCEDURE — G0008 ADMIN INFLUENZA VIRUS VAC: HCPCS | Performed by: FAMILY MEDICINE

## 2021-10-07 PROCEDURE — 90662 IIV NO PRSV INCREASED AG IM: CPT | Performed by: FAMILY MEDICINE

## 2021-10-07 PROCEDURE — 99214 OFFICE O/P EST MOD 30 MIN: CPT | Mod: 25 | Performed by: FAMILY MEDICINE

## 2021-10-07 RX ORDER — LATANOPROST 50 UG/ML
SOLUTION/ DROPS OPHTHALMIC
COMMUNITY
Start: 2021-08-23

## 2021-10-07 ASSESSMENT — PAIN SCALES - GENERAL: PAINLEVEL: NO PAIN (0)

## 2021-10-07 NOTE — PATIENT INSTRUCTIONS

## 2021-10-07 NOTE — H&P (VIEW-ONLY)
[address] [address]  Phone: [phone]  Fax: [phone]  Primary Provider: Melody Will  Pre-op Performing Provider: MELODY WILL      PREOPERATIVE EVALUATION:  Today's date: 10/7/2021    Coby Damon is a 81 year old female who presents for a preoperative evaluation.    Surgical Information:  Surgery/Procedure: 2 injections to sacroiliac joint  Surgery Location: [facility]  Surgeon: Sawyer Webster  Surgery Date: 10/15/21  Time of Surgery: 7:30  Where patient plans to recover: At home with family  Fax number for surgical facility:     Type of Anesthesia Anticipated: Local with MAC    Assessment & Plan     The proposed surgical procedure is considered INTERMEDIATE risk.    Preop general physical exam      Acute bilateral low back pain with sciatica, sciatica laterality unspecified      Need for prophylactic vaccination and inoculation against influenza    - INFLUENZA, QUAD, HIGH DOSE, PF, 65YR + (FLUZONE HD)             Medication Instructions:  Patient is to take all scheduled medications on the day of surgery    RECOMMENDATION:  APPROVAL GIVEN to proceed with proposed procedure, without further diagnostic evaluation.                      Subjective     HPI related to upcoming procedure: Worsening trouble in low back.    Preop Questions 10/7/2021   1. Have you ever had a heart attack or stroke? No   2. Have you ever had surgery on your heart or blood vessels, such as a stent placement, a coronary artery bypass, or surgery on an artery in your head, neck, heart, or legs? No   3. Do you have chest pain with activity? No   4. Do you have a history of  heart failure? No   5. Do you currently have a cold, bronchitis or symptoms of other infection? No   6. Do you have a cough, shortness of breath, or wheezing? No   7. Do you or anyone in your family have previous history of blood clots? No   8. Do you or does anyone in your family  have a serious bleeding problem such as prolonged bleeding following surgeries or cuts? No   9. Have you ever had problems with anemia or been told to take iron pills? No   10. Have you had any abnormal blood loss such as black, tarry or bloody stools, or abnormal vaginal bleeding? No   11. Have you ever had a blood transfusion? No   11a. Have you ever had a transfusion reaction? -   12. Are you willing to have a blood transfusion if it is medically needed before, during, or after your surgery? Yes   13. Have you or any of your relatives ever had problems with anesthesia? No   14. Do you have sleep apnea, excessive snoring or daytime drowsiness? No   15. Do you have any artifical heart valves or other implanted medical devices like a pacemaker, defibrillator, or continuous glucose monitor? No   16. Do you have artificial joints? No   17. Are you allergic to latex? No   18. Is there any chance that you may be pregnant? -       Health Care Directive:  Patient has a Health Care Directive on file      Preoperative Review of :            Review of Systems  Constitutional, neuro, ENT, endocrine, pulmonary, cardiac, gastrointestinal, genitourinary, musculoskeletal, integument and psychiatric systems are negative, except as otherwise noted.    Patient Active Problem List    Diagnosis Date Noted     Sacroiliitis, not elsewhere classified (H) 05/25/2021     Priority: Medium     Diarrhea, unspecified type 01/08/2021     Priority: Medium     Scoliosis of thoracolumbar spine, unspecified scoliosis type 09/30/2019     Priority: Medium     Advance Care Planning 09/22/2017     Priority: Medium     Low back pain, unspecified back pain laterality, unspecified chronicity, with sciatica presence unspecified 12/14/2016     Priority: Medium     Cervicalgia 08/28/2015     Priority: Medium     Age-related osteoporosis without current pathological fracture 12/10/2014     Priority: Medium     Hyperlipidemia LDL goal <130 12/10/2014      Priority: Medium     CARDIOVASCULAR SCREENING; LDL GOAL LESS THAN 130 09/18/2013     Priority: Medium     Hypertension goal BP (blood pressure) < 140/90 09/18/2013     Priority: Medium      Past Medical History:   Diagnosis Date     Breast cancer (H) 2004    lumpectomy and 33 lymph nodes removed.     Shingles      Past Surgical History:   Procedure Laterality Date     COLONOSCOPY N/A 12/12/2014    Procedure: COLONOSCOPY;  Surgeon: Bandar Guidry MD;  Location: [facility]     HYSTERECTOMY, PAP NO LONGER INDICATED       INJECT EPIDURAL CERVICAL N/A 9/23/2015    Procedure: INJECT EPIDURAL CERVICAL;  Surgeon: Sawyer Webster MD;  [facility]     INJECT EPIDURAL CERVICAL Right 11/8/2019    Procedure: Cervical 6-7 Spine Injection;  Surgeon: Sawyer Webster MD;  [facility]     INJECT EPIDURAL CERVICAL N/A 10/2/2020    Procedure: INJECTION, SPINE, CERVICAL 6-7, EPIDURAL;  Surgeon: Sawyer Webster MD;  [facility]     INJECT JOINT SACROILIAC Bilateral 10/24/2019    Procedure: Bilateral Sacroiliac Joint Injections;  Surgeon: Sawyer Webster MD;  [facility]     INJECT JOINT SACROILIAC Bilateral 9/24/2020    Procedure: Bilaterl sacroiliac joint injections;  Surgeon: Sawyer Webster MD;  [facility]     PHACOEMULSIFICATION WITH STANDARD INTRAOCULAR LENS IMPLANT Left 11/15/2018    Procedure: PHACOEMULSIFICATION WITH STANDARD INTRAOCULAR LENS IMPLANT LEFT EYE;  Surgeon: Rian Ford MD;  [facility]     PHACOEMULSIFICATION WITH STANDARD INTRAOCULAR LENS IMPLANT Right 11/29/2018    Procedure: PHACOEMULSIFICATION WITH STANDARD INTRAOCULAR LENS IMPLANT RIGHT EYE;  Surgeon: Rian Ford MD;  [facility]     Current Outpatient Medications   Medication Sig Dispense Refill     acetaminophen (TYLENOL) 325 MG tablet Take 325-650 mg by mouth every 6 hours as needed       alendronate (FOSAMAX) 70 MG tablet Take 1 tablet (70 mg) by mouth every 7 days 12 tablet 3     Cholecalciferol (VITAMIN D)  2000 UNITS tablet Take 2,000 Units by mouth daily       IBUPROFEN PO Take 400 mg by mouth every 6 hours as needed for moderate pain       latanoprost (XALATAN) 0.005 % ophthalmic solution        lisinopril (ZESTRIL) 10 MG tablet Take 1 tablet (10 mg) by mouth daily 90 tablet 0     Magnesium Oxide 250 MG TABS Take 1 tablet by mouth daily Patient takes only every other day.       Multiple Vitamins-Minerals (MULTIVITAMIN OR)        Probiotic Product (PROBIOTIC-10 PO)        vitamin B complex with vitamin C (VITAMIN  B COMPLEX) tablet Take 1 tablet by mouth daily       gabapentin (NEURONTIN) 100 MG capsule Take 1 capsule (100 mg) by mouth 2 times daily AND 3 capsules (300 mg) At Bedtime. Take 1 - 100 mg capsule in the morning. Then 1 - 100 mg capsule after lunch. Then take 3 - 100 mg capsules at bedtime. 450 capsule 1       No Known Allergies     Social History     Tobacco Use     Smoking status: Never Smoker     Smokeless tobacco: Never Used   Substance Use Topics     Alcohol use: Yes     Comment:  1 glass of wine with dinner      History reviewed. No pertinent family history.  History   Drug Use No         Objective     /64 (BP Location: Right arm, Patient Position: Sitting)   Pulse 85   Temp 97.8  F (36.6  C) (Temporal)   Resp 16   Wt 49.8 kg (109 lb 11.2 oz)   LMP  (LMP Unknown)   SpO2 94%   BMI 20.73 kg/m      Physical Exam    GENERAL APPEARANCE: healthy, alert and no distress     EYES: EOMI, PERRL     HENT: ear canals and TM's normal and nose and mouth without ulcers or lesions     NECK: no adenopathy, no asymmetry, masses, or scars and thyroid normal to palpation     RESP: lungs clear to auscultation - no rales, rhonchi or wheezes     CV: regular rates and rhythm, normal S1 S2, no S3 or S4 and no murmur, click or rub     ABDOMEN:  soft, nontender, no HSM or masses and bowel sounds normal     MS: extremities normal- no gross deformities noted, no evidence of inflammation in joints, FROM in all  extremities.     SKIN: no suspicious lesions or rashes     NEURO: Normal strength and tone, sensory exam grossly normal, mentation intact and speech normal     PSYCH: mentation appears normal. and affect normal/bright     LYMPHATICS: No cervical adenopathy    Recent Labs   Lab Test 01/25/21  1109 12/13/19  1119   HGB 13.0  --      --     138   POTASSIUM 4.0 4.1   CR 0.79 0.78        Diagnostics:  No labs were ordered during this visit.   No EKG required for low risk surgery (cataract, skin procedure, breast biopsy, etc).    Revised Cardiac Risk Index (RCRI):  The patient has the following serious cardiovascular risks for perioperative complications:   - No serious cardiac risks = 0 points     RCRI Interpretation: 0 points: Class I (very low risk - 0.4% complication rate)           Signed Electronically by: Bandar Will MD  Copy of this evaluation report is provided to requesting physician.

## 2021-10-07 NOTE — PROGRESS NOTES
[address] [address]  Phone: [phone]  Fax: [phone]  Primary Provider: Melody Will  Pre-op Performing Provider: MELODY WILL      PREOPERATIVE EVALUATION:  Today's date: 10/7/2021    Coby Damon is a 81 year old female who presents for a preoperative evaluation.    Surgical Information:  Surgery/Procedure: 2 injections to sacroiliac joint  Surgery Location: [facility]  Surgeon: Sawyer Webster  Surgery Date: 10/15/21  Time of Surgery: 7:30  Where patient plans to recover: At home with family  Fax number for surgical facility:     Type of Anesthesia Anticipated: Local with MAC    Assessment & Plan     The proposed surgical procedure is considered INTERMEDIATE risk.    Preop general physical exam      Acute bilateral low back pain with sciatica, sciatica laterality unspecified      Need for prophylactic vaccination and inoculation against influenza    - INFLUENZA, QUAD, HIGH DOSE, PF, 65YR + (FLUZONE HD)             Medication Instructions:  Patient is to take all scheduled medications on the day of surgery    RECOMMENDATION:  APPROVAL GIVEN to proceed with proposed procedure, without further diagnostic evaluation.                      Subjective     HPI related to upcoming procedure: Worsening trouble in low back.    Preop Questions 10/7/2021   1. Have you ever had a heart attack or stroke? No   2. Have you ever had surgery on your heart or blood vessels, such as a stent placement, a coronary artery bypass, or surgery on an artery in your head, neck, heart, or legs? No   3. Do you have chest pain with activity? No   4. Do you have a history of  heart failure? No   5. Do you currently have a cold, bronchitis or symptoms of other infection? No   6. Do you have a cough, shortness of breath, or wheezing? No   7. Do you or anyone in your family have previous history of blood clots? No   8. Do you or does anyone in your family  have a serious bleeding problem such as prolonged bleeding following surgeries or cuts? No   9. Have you ever had problems with anemia or been told to take iron pills? No   10. Have you had any abnormal blood loss such as black, tarry or bloody stools, or abnormal vaginal bleeding? No   11. Have you ever had a blood transfusion? No   11a. Have you ever had a transfusion reaction? -   12. Are you willing to have a blood transfusion if it is medically needed before, during, or after your surgery? Yes   13. Have you or any of your relatives ever had problems with anesthesia? No   14. Do you have sleep apnea, excessive snoring or daytime drowsiness? No   15. Do you have any artifical heart valves or other implanted medical devices like a pacemaker, defibrillator, or continuous glucose monitor? No   16. Do you have artificial joints? No   17. Are you allergic to latex? No   18. Is there any chance that you may be pregnant? -       Health Care Directive:  Patient has a Health Care Directive on file      Preoperative Review of :            Review of Systems  Constitutional, neuro, ENT, endocrine, pulmonary, cardiac, gastrointestinal, genitourinary, musculoskeletal, integument and psychiatric systems are negative, except as otherwise noted.    Patient Active Problem List    Diagnosis Date Noted     Sacroiliitis, not elsewhere classified (H) 05/25/2021     Priority: Medium     Diarrhea, unspecified type 01/08/2021     Priority: Medium     Scoliosis of thoracolumbar spine, unspecified scoliosis type 09/30/2019     Priority: Medium     Advance Care Planning 09/22/2017     Priority: Medium     Low back pain, unspecified back pain laterality, unspecified chronicity, with sciatica presence unspecified 12/14/2016     Priority: Medium     Cervicalgia 08/28/2015     Priority: Medium     Age-related osteoporosis without current pathological fracture 12/10/2014     Priority: Medium     Hyperlipidemia LDL goal <130 12/10/2014      Priority: Medium     CARDIOVASCULAR SCREENING; LDL GOAL LESS THAN 130 09/18/2013     Priority: Medium     Hypertension goal BP (blood pressure) < 140/90 09/18/2013     Priority: Medium      Past Medical History:   Diagnosis Date     Breast cancer (H) 2004    lumpectomy and 33 lymph nodes removed.     Shingles      Past Surgical History:   Procedure Laterality Date     COLONOSCOPY N/A 12/12/2014    Procedure: COLONOSCOPY;  Surgeon: Bandar Guidry MD;  [facility]     HYSTERECTOMY, PAP NO LONGER INDICATED       INJECT EPIDURAL CERVICAL N/A 9/23/2015    Procedure: INJECT EPIDURAL CERVICAL;  Surgeon: Sawyer Webster MD;  [facility]     INJECT EPIDURAL CERVICAL Right 11/8/2019    Procedure: Cervical 6-7 Spine Injection;  Surgeon: Sawyer Webster MD;  [facility]     INJECT EPIDURAL CERVICAL N/A 10/2/2020    Procedure: INJECTION, SPINE, CERVICAL 6-7, EPIDURAL;  Surgeon: Sawyer Webster MD;  [facility]     INJECT JOINT SACROILIAC Bilateral 10/24/2019    Procedure: Bilateral Sacroiliac Joint Injections;  Surgeon: Sawyer Webster MD;  [facility]     INJECT JOINT SACROILIAC Bilateral 9/24/2020    Procedure: Bilaterl sacroiliac joint injections;  Surgeon: Sawyer Webster MD;  [facility]     PHACOEMULSIFICATION WITH STANDARD INTRAOCULAR LENS IMPLANT Left 11/15/2018    Procedure: PHACOEMULSIFICATION WITH STANDARD INTRAOCULAR LENS IMPLANT LEFT EYE;  Surgeon: Rian Ford MD;  [facility]     PHACOEMULSIFICATION WITH STANDARD INTRAOCULAR LENS IMPLANT Right 11/29/2018    Procedure: PHACOEMULSIFICATION WITH STANDARD INTRAOCULAR LENS IMPLANT RIGHT EYE;  Surgeon: Rian Ford MD;  [facility]     Current Outpatient Medications   Medication Sig Dispense Refill     acetaminophen (TYLENOL) 325 MG tablet Take 325-650 mg by mouth every 6 hours as needed       alendronate (FOSAMAX) 70 MG tablet Take 1 tablet (70 mg) by mouth every 7 days 12 tablet 3     Cholecalciferol (VITAMIN D)  2000 UNITS tablet Take 2,000 Units by mouth daily       IBUPROFEN PO Take 400 mg by mouth every 6 hours as needed for moderate pain       latanoprost (XALATAN) 0.005 % ophthalmic solution        lisinopril (ZESTRIL) 10 MG tablet Take 1 tablet (10 mg) by mouth daily 90 tablet 0     Magnesium Oxide 250 MG TABS Take 1 tablet by mouth daily Patient takes only every other day.       Multiple Vitamins-Minerals (MULTIVITAMIN OR)        Probiotic Product (PROBIOTIC-10 PO)        vitamin B complex with vitamin C (VITAMIN  B COMPLEX) tablet Take 1 tablet by mouth daily       gabapentin (NEURONTIN) 100 MG capsule Take 1 capsule (100 mg) by mouth 2 times daily AND 3 capsules (300 mg) At Bedtime. Take 1 - 100 mg capsule in the morning. Then 1 - 100 mg capsule after lunch. Then take 3 - 100 mg capsules at bedtime. 450 capsule 1       No Known Allergies     Social History     Tobacco Use     Smoking status: Never Smoker     Smokeless tobacco: Never Used   Substance Use Topics     Alcohol use: Yes     Comment:  1 glass of wine with dinner      History reviewed. No pertinent family history.  History   Drug Use No         Objective     /64 (BP Location: Right arm, Patient Position: Sitting)   Pulse 85   Temp 97.8  F (36.6  C) (Temporal)   Resp 16   Wt 49.8 kg (109 lb 11.2 oz)   LMP  (LMP Unknown)   SpO2 94%   BMI 20.73 kg/m      Physical Exam    GENERAL APPEARANCE: healthy, alert and no distress     EYES: EOMI, PERRL     HENT: ear canals and TM's normal and nose and mouth without ulcers or lesions     NECK: no adenopathy, no asymmetry, masses, or scars and thyroid normal to palpation     RESP: lungs clear to auscultation - no rales, rhonchi or wheezes     CV: regular rates and rhythm, normal S1 S2, no S3 or S4 and no murmur, click or rub     ABDOMEN:  soft, nontender, no HSM or masses and bowel sounds normal     MS: extremities normal- no gross deformities noted, no evidence of inflammation in joints, FROM in all  extremities.     SKIN: no suspicious lesions or rashes     NEURO: Normal strength and tone, sensory exam grossly normal, mentation intact and speech normal     PSYCH: mentation appears normal. and affect normal/bright     LYMPHATICS: No cervical adenopathy    Recent Labs   Lab Test 01/25/21  1109 12/13/19  1119   HGB 13.0  --      --     138   POTASSIUM 4.0 4.1   CR 0.79 0.78        Diagnostics:  No labs were ordered during this visit.   No EKG required for low risk surgery (cataract, skin procedure, breast biopsy, etc).    Revised Cardiac Risk Index (RCRI):  The patient has the following serious cardiovascular risks for perioperative complications:   - No serious cardiac risks = 0 points     RCRI Interpretation: 0 points: Class I (very low risk - 0.4% complication rate)           Signed Electronically by: Bandar Will MD  Copy of this evaluation report is provided to requesting physician.

## 2021-10-11 ENCOUNTER — LAB (OUTPATIENT)
Dept: LAB | Facility: OTHER | Age: 81
End: 2021-10-11
Payer: COMMERCIAL

## 2021-10-11 DIAGNOSIS — Z11.59 ENCOUNTER FOR SCREENING FOR OTHER VIRAL DISEASES: ICD-10-CM

## 2021-10-11 PROCEDURE — U0003 INFECTIOUS AGENT DETECTION BY NUCLEIC ACID (DNA OR RNA); SEVERE ACUTE RESPIRATORY SYNDROME CORONAVIRUS 2 (SARS-COV-2) (CORONAVIRUS DISEASE [COVID-19]), AMPLIFIED PROBE TECHNIQUE, MAKING USE OF HIGH THROUGHPUT TECHNOLOGIES AS DESCRIBED BY CMS-2020-01-R: HCPCS

## 2021-10-11 PROCEDURE — U0005 INFEC AGEN DETEC AMPLI PROBE: HCPCS

## 2021-10-12 LAB — SARS-COV-2 RNA RESP QL NAA+PROBE: NEGATIVE

## 2021-10-14 ENCOUNTER — ANESTHESIA EVENT (OUTPATIENT)
Dept: SURGERY | Facility: CLINIC | Age: 81
End: 2021-10-14
Payer: COMMERCIAL

## 2021-10-14 ASSESSMENT — LIFESTYLE VARIABLES: TOBACCO_USE: 0

## 2021-10-14 NOTE — ANESTHESIA PREPROCEDURE EVALUATION
Anesthesia Pre-Procedure Evaluation    Patient: Coby Damon   MRN: [id number] : 1940        Preoperative Diagnosis: SI (sacroiliac) joint dysfunction [M53.3]    Procedure : Procedure(s):  INJECT JOINT SACROILIAC          Past Medical History:   Diagnosis Date     Breast cancer (H) 2004    lumpectomy and 33 lymph nodes removed.     Shingles       Past Surgical History:   Procedure Laterality Date     COLONOSCOPY N/A 2014    Procedure: COLONOSCOPY;  Surgeon: Bandar Guidry MD;  Location: [facility]     HYSTERECTOMY, PAP NO LONGER INDICATED       INJECT EPIDURAL CERVICAL N/A 2015    Procedure: INJECT EPIDURAL CERVICAL;  Surgeon: Sawyer Webster MD;  Location: [facility]     INJECT EPIDURAL CERVICAL Right 2019    Procedure: Cervical 6-7 Spine Injection;  Surgeon: Sawyer Webster MD;  [facility]     INJECT EPIDURAL CERVICAL N/A 10/2/2020    Procedure: INJECTION, SPINE, CERVICAL 6-7, EPIDURAL;  Surgeon: Sawyer Webster MD;  Location[facility]     INJECT JOINT SACROILIAC Bilateral 10/24/2019    Procedure: Bilateral Sacroiliac Joint Injections;  Surgeon: Sawyer Webster MD;  [facility]     INJECT JOINT SACROILIAC Bilateral 2020    Procedure: Bilaterl sacroiliac joint injections;  Surgeon: Sawyer Webster MD;  [facility]     PHACOEMULSIFICATION WITH STANDARD INTRAOCULAR LENS IMPLANT Left 11/15/2018    Procedure: PHACOEMULSIFICATION WITH STANDARD INTRAOCULAR LENS IMPLANT LEFT EYE;  Surgeon: Rian Ford MD;  [facility]     PHACOEMULSIFICATION WITH STANDARD INTRAOCULAR LENS IMPLANT Right 2018    Procedure: PHACOEMULSIFICATION WITH STANDARD INTRAOCULAR LENS IMPLANT RIGHT EYE;  Surgeon: Rian Ford MD;  Location[facility]      No Known Allergies   Social History     Tobacco Use     Smoking status: Never Smoker     Smokeless tobacco: Never Used   Substance Use Topics     Alcohol use: Yes     Comment:  1 glass of wine with dinner       Wt Readings from Last 1  Encounters:   10/07/21 49.8 kg (109 lb 11.2 oz)        Anesthesia Evaluation            ROS/MED HX  ENT/Pulmonary:    (-) tobacco use and asthma   Neurologic:       Cardiovascular:     (+) hypertension-range: < 140/90/ ----    METS/Exercise Tolerance:     Hematologic:       Musculoskeletal: Comment: Age-related osteoporosis without current pathological fracture  Low back pain, unspecified back pain laterality, unspecified chronicity, with sciatica presence unspecified  Scoliosis of thoracolumbar spine, unspecified scoliosis type  Sacroiliitis      GI/Hepatic:       Renal/Genitourinary:       Endo:       Psychiatric/Substance Use:       Infectious Disease:       Malignancy:   (+) Malignancy, History of Breast.    Other:            Physical Exam    Airway  airway exam normal      Mallampati: II   TM distance: > 3 FB   Neck ROM: full   Mouth opening: > 3 cm    Respiratory Devices and Support         Dental  no notable dental history         Cardiovascular   cardiovascular exam normal       Rhythm and rate: regular and normal     Pulmonary   pulmonary exam normal        breath sounds clear to auscultation           OUTSIDE LABS:  CBC:   Lab Results   Component Value Date    WBC 5.0 01/25/2021    WBC 4.9 06/04/2014    HGB 13.0 01/25/2021    HGB 13.6 06/04/2014    HCT 38.6 01/25/2021    HCT 41.1 06/04/2014     01/25/2021     06/04/2014     BMP:   Lab Results   Component Value Date     01/25/2021     12/13/2019    POTASSIUM 4.0 01/25/2021    POTASSIUM 4.1 12/13/2019    CHLORIDE 103 01/25/2021    CHLORIDE 105 12/13/2019    CO2 29 01/25/2021    CO2 29 12/13/2019    BUN 15 01/25/2021    BUN 12 12/13/2019    CR 0.79 01/25/2021    CR 0.78 12/13/2019    GLC 91 01/25/2021    GLC 95 12/13/2019     COAGS: No results found for: PTT, INR, FIBR  POC: No results found for: BGM, HCG, HCGS  HEPATIC:   Lab Results   Component Value Date    ALBUMIN 3.8 01/25/2021    PROTTOTAL 7.1 01/25/2021    ALT 15 01/25/2021     AST 11 01/25/2021    ALKPHOS 71 01/25/2021    BILITOTAL 0.4 01/25/2021     OTHER:   Lab Results   Component Value Date    A1C 5.5 03/06/2013    ASHLEY 9.0 01/25/2021    LIPASE 89 06/04/2014    CRP <2.9 01/25/2021    SED 11 06/04/2014       Anesthesia Plan    ASA Status:  2   NPO Status:  NPO Appropriate    Anesthesia Type: MAC.     - Reason for MAC: straight local not clinically adequate   Induction: Intravenous, Propofol.   Maintenance: TIVA.        Consents    Anesthesia Plan(s) and associated risks, benefits, and realistic alternatives discussed. Questions answered and patient/representative(s) expressed understanding.     - Discussed with:  Patient      - Extended Intubation/Ventilatory Support Discussed: No.      - Patient is DNR/DNI Status: No    Use of blood products discussed: No .     Postoperative Care    Pain management: IV analgesics.        Comments:    The risks and benefits of anesthesia, and the alternatives where applicable, have been discussed with the patient, and they wish to proceed.            SHONDA Mack CRNA

## 2021-10-15 ENCOUNTER — HOSPITAL ENCOUNTER (OUTPATIENT)
Dept: GENERAL RADIOLOGY | Facility: CLINIC | Age: 81
End: 2021-10-15
Attending: ANESTHESIOLOGY | Admitting: ANESTHESIOLOGY
Payer: COMMERCIAL

## 2021-10-15 ENCOUNTER — ANESTHESIA (OUTPATIENT)
Dept: SURGERY | Facility: CLINIC | Age: 81
End: 2021-10-15
Payer: COMMERCIAL

## 2021-10-15 ENCOUNTER — HOSPITAL ENCOUNTER (OUTPATIENT)
Facility: CLINIC | Age: 81
Discharge: HOME | End: 2021-10-15
Attending: ANESTHESIOLOGY | Admitting: ANESTHESIOLOGY
Payer: COMMERCIAL

## 2021-10-15 VITALS
RESPIRATION RATE: 16 BRPM | HEART RATE: 69 BPM | DIASTOLIC BLOOD PRESSURE: 93 MMHG | SYSTOLIC BLOOD PRESSURE: 108 MMHG | OXYGEN SATURATION: 97 % | TEMPERATURE: 99 F

## 2021-10-15 DIAGNOSIS — M53.3 SI (SACROILIAC) JOINT DYSFUNCTION: ICD-10-CM

## 2021-10-15 PROCEDURE — 27096 INJECT SACROILIAC JOINT: CPT | Mod: 50 | Performed by: ANESTHESIOLOGY

## 2021-10-15 PROCEDURE — 20550 NJX 1 TENDON SHEATH/LIGAMENT: CPT | Performed by: ANESTHESIOLOGY

## 2021-10-15 PROCEDURE — 250N000009 HC RX 250: Performed by: NURSE ANESTHETIST, CERTIFIED REGISTERED

## 2021-10-15 PROCEDURE — 370N000017 HC ANESTHESIA TECHNICAL FEE, PER MIN: Performed by: ANESTHESIOLOGY

## 2021-10-15 PROCEDURE — 999N000179 XR SURGERY CARM FLUORO LESS THAN 5 MIN W STILLS: Mod: TC

## 2021-10-15 PROCEDURE — 250N000011 HC RX IP 250 OP 636: Performed by: ANESTHESIOLOGY

## 2021-10-15 PROCEDURE — 250N000009 HC RX 250: Performed by: ANESTHESIOLOGY

## 2021-10-15 PROCEDURE — 20550 NJX 1 TENDON SHEATH/LIGAMENT: CPT | Mod: 50

## 2021-10-15 PROCEDURE — 27096 INJECT SACROILIAC JOINT: CPT | Performed by: ANESTHESIOLOGY

## 2021-10-15 PROCEDURE — 250N000011 HC RX IP 250 OP 636: Performed by: NURSE ANESTHETIST, CERTIFIED REGISTERED

## 2021-10-15 RX ORDER — PROPOFOL 10 MG/ML
INJECTION, EMULSION INTRAVENOUS PRN
Status: DISCONTINUED | OUTPATIENT
Start: 2021-10-15 | End: 2021-10-15

## 2021-10-15 RX ORDER — LIDOCAINE HYDROCHLORIDE 20 MG/ML
INJECTION, SOLUTION INFILTRATION; PERINEURAL PRN
Status: DISCONTINUED | OUTPATIENT
Start: 2021-10-15 | End: 2021-10-15

## 2021-10-15 RX ORDER — LIDOCAINE 40 MG/G
CREAM TOPICAL
Status: DISCONTINUED | OUTPATIENT
Start: 2021-10-15 | End: 2021-10-15 | Stop reason: HOSPADM

## 2021-10-15 RX ORDER — SODIUM CHLORIDE, SODIUM LACTATE, POTASSIUM CHLORIDE, CALCIUM CHLORIDE 600; 310; 30; 20 MG/100ML; MG/100ML; MG/100ML; MG/100ML
INJECTION, SOLUTION INTRAVENOUS CONTINUOUS
Status: DISCONTINUED | OUTPATIENT
Start: 2021-10-15 | End: 2021-10-15 | Stop reason: HOSPADM

## 2021-10-15 RX ORDER — IOPAMIDOL 612 MG/ML
INJECTION, SOLUTION INTRATHECAL PRN
Status: DISCONTINUED | OUTPATIENT
Start: 2021-10-15 | End: 2021-10-15 | Stop reason: HOSPADM

## 2021-10-15 RX ORDER — METHYLPREDNISOLONE ACETATE 80 MG/ML
INJECTION, SUSPENSION INTRA-ARTICULAR; INTRALESIONAL; INTRAMUSCULAR; SOFT TISSUE PRN
Status: DISCONTINUED | OUTPATIENT
Start: 2021-10-15 | End: 2021-10-15 | Stop reason: HOSPADM

## 2021-10-15 RX ORDER — TRIAMCINOLONE ACETONIDE 40 MG/ML
INJECTION, SUSPENSION INTRA-ARTICULAR; INTRAMUSCULAR PRN
Status: DISCONTINUED | OUTPATIENT
Start: 2021-10-15 | End: 2021-10-15 | Stop reason: HOSPADM

## 2021-10-15 RX ORDER — BUPIVACAINE HYDROCHLORIDE 5 MG/ML
INJECTION, SOLUTION PERINEURAL PRN
Status: DISCONTINUED | OUTPATIENT
Start: 2021-10-15 | End: 2021-10-15 | Stop reason: HOSPADM

## 2021-10-15 RX ADMIN — PROPOFOL 30 MG: 10 INJECTION, EMULSION INTRAVENOUS at 08:35

## 2021-10-15 RX ADMIN — PROPOFOL 30 MG: 10 INJECTION, EMULSION INTRAVENOUS at 08:34

## 2021-10-15 RX ADMIN — PROPOFOL 70 MG: 10 INJECTION, EMULSION INTRAVENOUS at 08:27

## 2021-10-15 RX ADMIN — LIDOCAINE HYDROCHLORIDE 60 MG: 20 INJECTION, SOLUTION INFILTRATION; PERINEURAL at 08:27

## 2021-10-15 NOTE — ANESTHESIA CARE TRANSFER NOTE
Patient: Coby Damon    Procedure: Procedure(s):  INJECT JOINT SACROILIAC       Diagnosis: SI (sacroiliac) joint dysfunction [M53.3]  Diagnosis Additional Information: No value filed.    Anesthesia Type:   MAC     Note:    Oropharynx: spontaneously breathing  Level of Consciousness: awake  Oxygen Supplementation: room air    Independent Airway: airway patency satisfactory and stable  Dentition: dentition unchanged  Vital Signs Stable: post-procedure vital signs reviewed and stable  Report to RN Given: handoff report given  Patient transferred to: Phase II    Handoff Report: Identifed the Patient, Identified the Reponsible Provider, Reviewed the pertinent medical history, Discussed the surgical course, Reviewed Intra-OP anesthesia mangement and issues during anesthesia, Set expectations for post-procedure period and Allowed opportunity for questions and acknowledgement of understanding      Vitals:  Vitals Value Taken Time   BP     Temp     Pulse     Resp     SpO2 99 % 10/15/21 0844   Vitals shown include unvalidated device data.    Electronically Signed By: SHONDA Mack CRNA  October 15, 2021  8:45 AM

## 2021-10-15 NOTE — OP NOTE
CHIEF COMPLAINT:    1.SI joint dysfunction (Sacroilitis) and pain (724.6)   2 Sacral enthesopathy (720.1)    PROCEDURE:   Fluoroscopically-guided injection of the Bilateral  sacroiliac joints with Bilateral andre Sacroiliac joint ligaments infiltration over the sacrum.    PROCEDURE DETAILS: After written informed consent was obtained from the patient, the patient was escorted to the procedure room.  The patient was placed in the prone position.   A time out was conducted to verify patient identity, procedure to be performed, side, site, allergies and any special requirements.  The skin over the lumbosacral region was prepped and draped in normal sterile fashion. Fluoroscopy was used to identify the posteroinferior region of the sacroiliac joint.  The skin was anesthetized with 2 mL of 1% lidocaine with bicarbonate buffer.  Using fluoroscopic guidance, a 22 gauge, 3.5  Quincke spinal needle was advanced into the joint from an inferior approach.  After negative aspiration, 0.5 ml of Omnipaque contrast was injected showing intra-articular spread of contrast without evidence of intravascular spread.  2.0 mL of solution consisting of 20 mg of triamcinolone and 1.5 cc of 0.5% marcaine was injected slowly into the joint.   A second injection at the sacroiliac joint ligaments was then performed.  The middle third of the SI Joint was identified with fluoroscopy. After advancing a 22 gauge, 3.5  Quincke spinal needle to these ligaments with intermittent fluoroscopic guidance,  3 mL of solution consisting of  10 mg of triamcinolone and 2.75 mL of 0.5 Marcaine was injected periligamentous and intramuscular over the sacroiliac joint ligaments.    This was performed bilaterally.  The patient was monitored with blood pressure and pulse oximetry machines with the assistance of an RN throughout the procedure.  The patient was alert and responsive to questions throughout the procedure.   The patient tolerated the procedure well and  was observed in the post-procedural area.  The patient was dismissed without apparent complications.   Blood loss: Less than 5 cc.    DIAGNOSIS:  1.  Bilateral sacroilitis  2.  Bilateral sacral enthesopathy  PLAN:  1. Performed Bilateral   Sacroiliac joint injections.  2. Bilateral SI ligament injections over the sacrum   3.  She has had these in the past with good pain relief however the last one was not as helpful.  She does have updated imaging which shows other pathology higher up in her lumbar spine and preoperatively her exam reveals pain more in the left upper lumbar parasagittal location which would correlate more with the facet joint problem.  She does have facet hypertrophy as well as lateral recess stenosis at these levels.  I think if today's injection is again not as helpful as previous injections have been then I would recommend diagnostic and therapeutic facet injections at L3-4 and L4-5.         Sawyer Webster MD  Diplomate of the American Board of Anesthesiology, Pain Medicine

## 2021-10-15 NOTE — DISCHARGE INSTRUCTIONS
Home Care Instructions                Procedure: Epidural injection or joint injection    Activity:    Rest today    Do not work today    Resume normal activity tomorrow  Pain:    You may experience soreness at the injection site for 1 to 3 days.    You may use an ice pack for 20 minutes every 2 hours for the first 24 hours    You may use a heating pad after the first 24 hours    You may use Tylenol  (acetaminophen) every 4 hours or other pain medicines as directed by your physician  Safety  Sedation medicine, if given may remain active for many hours.    It is important for the next 24 hours that you do not:    Drive a car    Operate machines or power tools    Consume alcohol, including beer    Sign any important papers or legal documents    You may experience numbness radiating into your legs or arms, (depending on the procedure location)  This numbness may last several hours.  Until the numb sensation returns to normal please use caution in walking, climbing stairs, stepping out of your vehicle, etc.    Common side effects of steroids:  Not everyone will experience corticosteroid side effects. If side effects are experienced they will gradually subside in the 7-10 day period following an injection.    Most common side effects include:    Feeling of warmth, particularly in face but could be overall feeling of warmth    Increased blood sugar in diabetic patients    Menstrual irregularities may occur.  If taking hormone based birth control an alternate method of birth control is recommended    Sleep disturbances and/or mood swings are possible    Leg cramps    Please contact us if you have:    Fever more than 101.5 degrees Fahrenheit  Signs of infection (redness, swelling or drainage)      PLEASE GO TO THE NEAREST EMERGENCY ROOM IF YOU HAVE:   --Increasing numbness or weakness in your arms or legs or increasingly severe pain (greater than 4 hours after your injection)      If you have questions during normal  business hours (8am-5pm Monday-Friday) contact central scheduling at [phone] to access the [facility] . If you need help after hours, we recommend that you go to a hospital emergency room or dial 911.

## 2021-10-15 NOTE — ANESTHESIA POSTPROCEDURE EVALUATION
Patient: Coby Damon    Procedure: Procedure(s):  INJECT JOINT SACROILIAC       Diagnosis:SI (sacroiliac) joint dysfunction [M53.3]  Diagnosis Additional Information: No value filed.    Anesthesia Type:  MAC    Note:  Disposition: Outpatient   Postop Pain Control: Uneventful            Sign Out: Well controlled pain   PONV: No   Neuro/Psych: Uneventful            Sign Out: Acceptable/Baseline neuro status   Airway/Respiratory: Uneventful            Sign Out: Acceptable/Baseline resp. status   CV/Hemodynamics: Uneventful            Sign Out: Acceptable CV status   Other NRE: NONE   DID A NON-ROUTINE EVENT OCCUR? No    Event details/Postop Comments:  Pt was happy with anesthesia care.  No complications.  I will follow up with the pt if needed.           Last vitals:  Vitals Value Taken Time   /62 10/15/21 0845   Temp     Pulse 72 10/15/21 0845   Resp     SpO2 98 % 10/15/21 0846   Vitals shown include unvalidated device data.    Electronically Signed By: SHONDA Mack CRNA  October 15, 2021  8:47 AM

## 2021-10-25 ENCOUNTER — NURSE TRIAGE (OUTPATIENT)
Dept: NURSING | Facility: CLINIC | Age: 81
End: 2021-10-25

## 2021-10-25 NOTE — PROGRESS NOTES
"Coby is a 81 year old who is being evaluated via a billable telephone visit.      How would you like to obtain your AVS? Gary  If the video visit is dropped, the invitation should be resent by:  [phone]  Will anyone else be joining your video visit? No        Assessment & Plan     Suspected 2019 novel coronavirus infection    - Symptomatic COVID-19 Virus (Coronavirus) by PCR Nose; Future    Patient is stable for home plan.  Continue plan.    \"Stay at Home\" cares.   Covid testing, quarantine until test resulted.   Hydration and PO for leg cramps.   Warning signs discussed.   Return to clinic with any new or worsening symptoms, and as needed.                MEDICATIONS:  Continue current medications without change    Return in about 1 day (around 10/27/2021) for Lab Work.    SHONDA Azevedo [facility] Tenorio is a 81 year old who presents for the following health issues     HPI       Concern for COVID-19  About how many days ago did these symptoms start? 4 days  Is this your first visit for this illness? Yes  In the 14 days before your symptoms started, have you had close contact with someone with COVID-19 (Coronavirus)? No  Do you have a fever or chills? Yes, I felt feverish or had chills  Are you having new or worsening difficulty breathing? No  Do you have new or worsening cough? Yes, it's a dry cough. - no longer coughing  Have you had any new or unexplained body aches? YES  - leg cramps- not calf pain  Have you experienced any of the following NEW symptoms?    Headache: No    Sore throat: YES    Loss of taste or smell: YES    Chest pain: No    Diarrhea: No    Rash: No  What treatments have you tried? Lacey vaughan  Who do you live with? Self  Are you, or a household member, a healthcare worker or a ? No  Do you live in a nursing home, group home, or shelter? No  Do you have a way to get food/medications if quarantined? Yes, I have a " friend or family member who can help me.    PO- is less, but taking well. Less solid due to no taste.   Diarrhea-no  SOB-none, no OCONNELL  ADL's- no issues.   Leg cramps- always have had this, takes occ. Mg.   Has a little constipation.   Not really coughing.             Review of Systems   Constitutional, HEENT, cardiovascular, pulmonary, gi and gu systems are negative, except as otherwise noted.      Objective           Vitals:  No vitals were obtained today due to virtual visit.    Physical Exam   healthy, alert and no distress  PSYCH: Alert and oriented times 3; coherent speech, normal   rate and volume, able to articulate logical thoughts, able   to abstract reason, no tangential thoughts, no hallucinations   or delusions  Her affect is normal and pleasant  RESP: No cough, no audible wheezing, able to talk in full sentences  Remainder of exam unable to be completed due to telephone visits                    Type of service:  telephone Visit    Length of visit. 8 minutes.     Originating Location (pt. Location): Home    Distant Location (provider location):  [facility]

## 2021-10-25 NOTE — TELEPHONE ENCOUNTER
Patient calling reporting cough, loss of taste and smell, swollen glands and fatigue since 10/23. Denies fever, difficulty breathing and chest pain. Patient concerned she may have COVID. Advised per protocol to have a virtual visit today with patient agreeable to the plan.     Elicia Woodward RN 10/25/21 11:41 AM   [facility] Triage Nurse Advisor      Reason for Disposition    HIGH RISK for severe COVID complications (e.g., age > 64 years, obesity with BMI > 25, pregnant, chronic lung disease or other chronic medical condition)  (Exception: Already seen by PCP and no new or worsening symptoms.)    Additional Information    Negative: SEVERE difficulty breathing (e.g., struggling for each breath, speaks in single words)    Negative: Difficult to awaken or acting confused (e.g., disoriented, slurred speech)    Negative: Bluish (or gray) lips or face now    Negative: Shock suspected (e.g., cold/pale/clammy skin, too weak to stand, low BP, rapid pulse)    Negative: Sounds like a life-threatening emergency to the triager    Negative: [1] COVID-19 exposure AND [2] no symptoms    Negative: COVID-19 vaccine reaction suspected (e.g., fever, headache, muscle aches) occurring 1 to 3 days after getting vaccine    Negative: COVID-19 vaccine, questions about    Negative: [1] Lives with someone known to have influenza (flu test positive) AND [2] flu-like symptoms (e.g., cough, runny nose, sore throat, SOB; with or without fever)    Negative: [1] Adult with possible COVID-19 symptoms AND [2] triager concerned about severity of symptoms or other causes    Negative: COVID-19 and breastfeeding, questions about    Negative: SEVERE or constant chest pain or pressure (Exception: mild central chest pain, present only when coughing)    Negative: MODERATE difficulty breathing (e.g., speaks in phrases, SOB even at rest, pulse 100-120)    Negative: [1] Headache AND [2] stiff neck (can't touch chin to chest)    Negative: MILD difficulty breathing  (e.g., minimal/no SOB at rest, SOB with walking, pulse <100)    Negative: Chest pain or pressure    Negative: Patient sounds very sick or weak to the triager    Negative: Fever > 103 F (39.4 C)    Negative: [1] Fever > 101 F (38.3 C) AND [2] age > 60 years    Negative: [1] Fever > 100.0 F (37.8 C) AND [2] bedridden (e.g., nursing home patient, CVA, chronic illness, recovering from surgery)    Protocols used: CORONAVIRUS (COVID-19) DIAGNOSED OR [id number] 8.25.2021

## 2021-10-26 ENCOUNTER — VIRTUAL VISIT (OUTPATIENT)
Dept: FAMILY MEDICINE | Facility: CLINIC | Age: 81
End: 2021-10-26
Payer: COMMERCIAL

## 2021-10-26 ENCOUNTER — LAB (OUTPATIENT)
Dept: FAMILY MEDICINE | Facility: CLINIC | Age: 81
End: 2021-10-26
Attending: NURSE PRACTITIONER
Payer: COMMERCIAL

## 2021-10-26 DIAGNOSIS — Z20.822 SUSPECTED 2019 NOVEL CORONAVIRUS INFECTION: ICD-10-CM

## 2021-10-26 DIAGNOSIS — Z20.822 SUSPECTED 2019 NOVEL CORONAVIRUS INFECTION: Primary | ICD-10-CM

## 2021-10-26 PROCEDURE — 99441 PR PHYSICIAN TELEPHONE EVALUATION 5-10 MIN: CPT | Mod: 95 | Performed by: NURSE PRACTITIONER

## 2021-10-26 PROCEDURE — U0005 INFEC AGEN DETEC AMPLI PROBE: HCPCS

## 2021-10-26 PROCEDURE — U0003 INFECTIOUS AGENT DETECTION BY NUCLEIC ACID (DNA OR RNA); SEVERE ACUTE RESPIRATORY SYNDROME CORONAVIRUS 2 (SARS-COV-2) (CORONAVIRUS DISEASE [COVID-19]), AMPLIFIED PROBE TECHNIQUE, MAKING USE OF HIGH THROUGHPUT TECHNOLOGIES AS DESCRIBED BY CMS-2020-01-R: HCPCS

## 2021-10-27 LAB — SARS-COV-2 RNA RESP QL NAA+PROBE: POSITIVE

## 2021-11-12 ENCOUNTER — NURSE TRIAGE (OUTPATIENT)
Dept: NURSING | Facility: CLINIC | Age: 81
End: 2021-11-12
Payer: COMMERCIAL

## 2021-11-12 NOTE — TELEPHONE ENCOUNTER
Patient calling about Moderna Booster.  She tested positive 17 days ago, currently asymptomatic.  Writer called [facility] pharmacy for patient.(they do have Moderna)  Patient's 2nd dose of covid vaccine was in March, so she meets criteria for booster.  Patient verbalized understanding and agrees with plan.     Yuli Kline RN  [facility] Nurse Advisor  1:05 PM 11/12/2021    Reason for Disposition    COVID-19 vaccine, Frequently Asked Questions (FAQs)    Protocols used: CORONAVIRUS (COVID-19) VACCINE QUESTIONS AND [id number] 8.25.2021

## 2021-12-06 DIAGNOSIS — I10 HYPERTENSION GOAL BP (BLOOD PRESSURE) < 140/90: ICD-10-CM

## 2021-12-08 RX ORDER — LISINOPRIL 10 MG/1
TABLET ORAL
Qty: 90 TABLET | Refills: 0 | Status: SHIPPED | OUTPATIENT
Start: 2021-12-08 | End: 2022-02-28

## 2021-12-08 NOTE — TELEPHONE ENCOUNTER
Routing refill request to provider for review/approval because:  Labs out of range:  BP    MITCH LeighN, RN  [facility]

## 2021-12-10 ENCOUNTER — TELEPHONE (OUTPATIENT)
Dept: FAMILY MEDICINE | Facility: CLINIC | Age: 81
End: 2021-12-10
Payer: COMMERCIAL

## 2021-12-10 NOTE — TELEPHONE ENCOUNTER
Reason for Call:  Other call back    Detailed comments: Patient called wanting to know if it is at all possible to get the handicap parking paperwork. She is hoping she can just come and  a completed form and hoping she doesn't need an appointment to get this done.     Phone Number Patient can be reached at: Home number on file [phone] (home)    Best Time: Any    Can we leave a detailed message on this number? YES    Call taken on 12/10/2021 at 9:01 AM by Stormy Jewell

## 2022-01-03 ENCOUNTER — OFFICE VISIT (OUTPATIENT)
Dept: FAMILY MEDICINE | Facility: CLINIC | Age: 82
End: 2022-01-03
Payer: COMMERCIAL

## 2022-01-03 VITALS
BODY MASS INDEX: 20.22 KG/M2 | HEART RATE: 92 BPM | TEMPERATURE: 98.6 F | DIASTOLIC BLOOD PRESSURE: 60 MMHG | SYSTOLIC BLOOD PRESSURE: 116 MMHG | OXYGEN SATURATION: 97 % | RESPIRATION RATE: 10 BRPM | WEIGHT: 107 LBS

## 2022-01-03 DIAGNOSIS — M54.40 LOW BACK PAIN WITH SCIATICA, SCIATICA LATERALITY UNSPECIFIED, UNSPECIFIED BACK PAIN LATERALITY, UNSPECIFIED CHRONICITY: Primary | ICD-10-CM

## 2022-01-03 PROCEDURE — 99213 OFFICE O/P EST LOW 20 MIN: CPT | Performed by: FAMILY MEDICINE

## 2022-01-03 NOTE — PROGRESS NOTES
Assessment & Plan     Low back pain with sciatica, sciatica laterality unspecified, unspecified back pain laterality, unspecified chronicity  This is here today to get handicap parking.  She has a difficult time walking and usually needs assistance of a walker or a cane.  Due to her spine condition.                   No follow-ups on file.    Bandar Will MD  [facility] L[facility]NDON Tenorio is a 81 year old who presents for the following health issues     HPI     Would like handicapped  parking permit.       Review of Systems   Constitutional, HEENT, cardiovascular, pulmonary, gi and gu systems are negative, except as otherwise noted.      Objective    /60   Pulse 92   Temp 98.6  F (37  C)   Resp 10   Wt 48.5 kg (107 lb)   LMP  (LMP Unknown)   SpO2 97%   BMI 20.22 kg/m    Body mass index is 20.22 kg/m .  Physical Exam   GENERAL: healthy, alert and no distress  EYES: Eyes grossly normal to inspection, PERRL and conjunctivae and sclerae normal  NECK: no adenopathy, no asymmetry, masses, or scars and thyroid normal to palpation  MS: no gross musculoskeletal defects noted, no edema  NEURO:  Lower extremities slightly unsteady gait walks with assistance of a cane.  PSYCH: mentation appears normal, affect normal/bright

## 2022-02-26 DIAGNOSIS — I10 HYPERTENSION GOAL BP (BLOOD PRESSURE) < 140/90: ICD-10-CM

## 2022-02-28 DIAGNOSIS — I10 HYPERTENSION GOAL BP (BLOOD PRESSURE) < 140/90: ICD-10-CM

## 2022-02-28 RX ORDER — LISINOPRIL 10 MG/1
TABLET ORAL
Qty: 90 TABLET | Refills: 0 | Status: SHIPPED | OUTPATIENT
Start: 2022-02-28 | End: 2022-05-25

## 2022-02-28 NOTE — TELEPHONE ENCOUNTER
Pending Prescriptions:                       Disp   Refills    lisinopril (ZESTRIL) 10 MG tablet [Pharmac*90 tab*0        Sig: Take 1 tablet by mouth once daily    Routing refill request to provider for review/approval because:  Labs out of range:  Potassium failed RN protocol    Whitney Sims RN

## 2022-03-18 DIAGNOSIS — M81.0 AGE-RELATED OSTEOPOROSIS WITHOUT CURRENT PATHOLOGICAL FRACTURE: ICD-10-CM

## 2022-03-20 ENCOUNTER — HEALTH MAINTENANCE LETTER (OUTPATIENT)
Age: 82
End: 2022-03-20

## 2022-03-22 RX ORDER — ALENDRONATE SODIUM 70 MG/1
70 TABLET ORAL
Qty: 12 TABLET | Refills: 3 | Status: SHIPPED | OUTPATIENT
Start: 2022-03-22 | End: 2023-03-01

## 2022-03-22 NOTE — TELEPHONE ENCOUNTER
Pending Prescriptions:                       Disp   Refills    alendronate (FOSAMAX) 70 MG tablet         12 tab*3        Sig: Take 1 tablet (70 mg) by mouth every 7 days      Routing refill request to provider for review/approval because:   Dexa on file within past 2 years    Normal serum creatinine on file within past 12 months       Stephanie Eisenberg RN

## 2022-05-24 DIAGNOSIS — I10 HYPERTENSION GOAL BP (BLOOD PRESSURE) < 140/90: ICD-10-CM

## 2022-05-25 RX ORDER — LISINOPRIL 10 MG/1
TABLET ORAL
Qty: 90 TABLET | Refills: 0 | Status: SHIPPED | OUTPATIENT
Start: 2022-05-25 | End: 2022-08-23

## 2022-05-25 NOTE — TELEPHONE ENCOUNTER
Pending Prescriptions:                       Disp   Refills    lisinopril (ZESTRIL) 10 MG tablet [Pharmac*90 tab*0        Sig: Take 1 tablet by mouth once daily    Routing refill request to provider for review/approval because:  Labs not current:  CR, Potassium    Whitney Sims RN

## 2022-08-19 ENCOUNTER — HOSPITAL ENCOUNTER (EMERGENCY)
Facility: CLINIC | Age: 82
Discharge: HOME | End: 2022-08-19
Attending: EMERGENCY MEDICINE | Admitting: EMERGENCY MEDICINE
Payer: COMMERCIAL

## 2022-08-19 ENCOUNTER — APPOINTMENT (OUTPATIENT)
Dept: GENERAL RADIOLOGY | Facility: CLINIC | Age: 82
End: 2022-08-19
Attending: EMERGENCY MEDICINE
Payer: COMMERCIAL

## 2022-08-19 VITALS
WEIGHT: 107.8 LBS | SYSTOLIC BLOOD PRESSURE: 127 MMHG | RESPIRATION RATE: 18 BRPM | BODY MASS INDEX: 20.37 KG/M2 | OXYGEN SATURATION: 98 % | TEMPERATURE: 98 F | HEART RATE: 86 BPM | DIASTOLIC BLOOD PRESSURE: 76 MMHG

## 2022-08-19 DIAGNOSIS — K59.01 SLOW TRANSIT CONSTIPATION: ICD-10-CM

## 2022-08-19 PROCEDURE — 250N000013 HC RX MED GY IP 250 OP 250 PS 637: Performed by: EMERGENCY MEDICINE

## 2022-08-19 PROCEDURE — 99283 EMERGENCY DEPT VISIT LOW MDM: CPT | Performed by: EMERGENCY MEDICINE

## 2022-08-19 PROCEDURE — 99282 EMERGENCY DEPT VISIT SF MDM: CPT | Performed by: EMERGENCY MEDICINE

## 2022-08-19 PROCEDURE — 74019 RADEX ABDOMEN 2 VIEWS: CPT

## 2022-08-19 RX ADMIN — MINERAL OIL 226 ML: 1 OIL ORAL at 12:24

## 2022-08-19 ASSESSMENT — ACTIVITIES OF DAILY LIVING (ADL)
ADLS_ACUITY_SCORE: 35

## 2022-08-19 NOTE — DISCHARGE INSTRUCTIONS
-X-ray confirmed that you have a large amounts of retained stool throughout the entire length of the colon.    -Unfortunately enema x2 with both tapwater enema and a pink lady enema was unsuccessful in getting stool evacuated.    -Discharge home with continued care for constipation.  Recommendations include:    MiraLAX/Gatorade prep-mix 32 ounce of Gatorade with a half a cup of MiraLAX.  Shake solution well.  Drink 6 ounces every 20 minutes until gone.    At the time of starting the MiraLAX Gatorade prep take 2 Dulcolax  tablets.  This to be purchased over-the-counter.    If no bowel results in 18- 24 hours and recommend repeating prep.  If that is unsuccessful you will need to return back to the emergency department.    Prep usually is very successful in getting good bowel cleansing.    Examination did not support concerns for acute diverticulitis.  If you start having pain that starts to build in the left lower quadrant and continues to escalate that would require clinic or ED visit.

## 2022-08-19 NOTE — ED TRIAGE NOTES
She last had a BM one week ago and has tried magnesium but it didn't help.  She has LLQ pressure like when she has had diverticulitis in the past.     Triage Assessment     Row Name 08/19/22 0853       Triage Assessment (Adult)    Airway WDL WDL       Respiratory WDL    Respiratory WDL WDL       Breath Sounds    Breath Sounds All Fields       Skin Circulation/Temperature WDL    Skin Circulation/Temperature WDL WDL       Cardiac WDL    Cardiac WDL WDL       Peripheral/Neurovascular WDL    Peripheral Neurovascular WDL WDL       Cognitive/Neuro/Behavioral WDL    Cognitive/Neuro/Behavioral WDL WDL

## 2022-08-19 NOTE — ED NOTES
She was able to hold only a small amount of the tap water enema and it did not produce a BM. Her bedding was changed and Dr Will updated.

## 2022-08-19 NOTE — ED PROVIDER NOTES
History     Chief Complaint   Patient presents with     Constipation     HPI     Coby Damon is a 82 year old female who presents with concerns for constipation.  Reports no BM x7 days.  Still passing a very small amount of flatulence.  No nausea or vomiting.  Normal appetite.  No abdominal distention.  Did have some abdominal pain left lower quadrant yesterday which is not represented this morning.  Has had a history for diverticulitis but she states she usually has gradual worsening of abdominal pain left lower quadrant associate with diarrhea.  She has had prior hysterectomy.  No history for any SBO.    Allergies:  No Known Allergies    Problem List:    Patient Active Problem List    Diagnosis Date Noted     Sacroiliitis, not elsewhere classified (H) 05/25/2021     Priority: Medium     Diarrhea, unspecified type 01/08/2021     Priority: Medium     Scoliosis of thoracolumbar spine, unspecified scoliosis type 09/30/2019     Priority: Medium     Advance Care Planning 09/22/2017     Priority: Medium     Low back pain, unspecified back pain laterality, unspecified chronicity, with sciatica presence unspecified 12/14/2016     Priority: Medium     Cervicalgia 08/28/2015     Priority: Medium     Age-related osteoporosis without current pathological fracture 12/10/2014     Priority: Medium     Hyperlipidemia LDL goal <130 12/10/2014     Priority: Medium     CARDIOVASCULAR SCREENING; LDL GOAL LESS THAN 130 09/18/2013     Priority: Medium     Hypertension goal BP (blood pressure) < 140/90 09/18/2013     Priority: Medium        Past Medical History:    Past Medical History:   Diagnosis Date     Breast cancer (H) 2004     Shingles        Past Surgical History:    Past Surgical History:   Procedure Laterality Date     COLONOSCOPY N/A 12/12/2014    Procedure: COLONOSCOPY;  Surgeon: Bandar Guidry MD;  Location: [facility]     HYSTERECTOMY, PAP NO LONGER INDICATED       INJECT EPIDURAL CERVICAL N/A 9/23/2015     Procedure: INJECT EPIDURAL CERVICAL;  Surgeon: Sawyer Webster MD;  Location: [facility]     INJECT EPIDURAL CERVICAL Right 11/8/2019    Procedure: Cervical 6-7 Spine Injection;  Surgeon: Sawyer Webster MD;  Location[facility]     INJECT EPIDURAL CERVICAL N/A 10/2/2020    Procedure: INJECTION, SPINE, CERVICAL 6-7, EPIDURAL;  Surgeon: Sawyer Webster MD;  Location[facility]     INJECT JOINT SACROILIAC Bilateral 10/24/2019    Procedure: Bilateral Sacroiliac Joint Injections;  Surgeon: Sawyer Webster MD;  [facility]     INJECT JOINT SACROILIAC Bilateral 9/24/2020    Procedure: Bilaterl sacroiliac joint injections;  Surgeon: Sawyer Webster MD;  [facility]     INJECT JOINT SACROILIAC Bilateral 10/15/2021    Procedure: Fluoroscopically-guided injection of the Bilateral sacroiliac joints with Bilateral andre Sacroiliac joint ligaments infiltration over the sacrum;  Surgeon: Sawyer Webster MD;  [facility]     PHACOEMULSIFICATION WITH STANDARD INTRAOCULAR LENS IMPLANT Left 11/15/2018    Procedure: PHACOEMULSIFICATION WITH STANDARD INTRAOCULAR LENS IMPLANT LEFT EYE;  Surgeon: Rian Ford MD;  [facility]     PHACOEMULSIFICATION WITH STANDARD INTRAOCULAR LENS IMPLANT Right 11/29/2018    Procedure: PHACOEMULSIFICATION WITH STANDARD INTRAOCULAR LENS IMPLANT RIGHT EYE;  Surgeon: Rian Ford MD;  Location: [facility]       Family History:    No family history on file.    Social History:  Marital Status:   [4]  Social History     Tobacco Use     Smoking status: Never Smoker     Smokeless tobacco: Never Used   Substance Use Topics     Alcohol use: Yes     Comment:  1 glass of wine with dinner      Drug use: No        Medications:    acetaminophen (TYLENOL) 325 MG tablet  alendronate (FOSAMAX) 70 MG tablet  lisinopril (ZESTRIL) 10 MG tablet  Cholecalciferol (VITAMIN D) 2000 UNITS tablet  gabapentin (NEURONTIN) 100 MG capsule  IBUPROFEN PO  latanoprost (XALATAN) 0.005 % ophthalmic  solution  Magnesium Oxide 250 MG TABS  Multiple Vitamins-Minerals (MULTIVITAMIN OR)  Probiotic Product (PROBIOTIC-10 PO)  vitamin B complex with vitamin C (VITAMIN  B COMPLEX) tablet          Review of Systems   All other systems reviewed and are negative.      Physical Exam   BP: 127/76  Pulse: 86  Temp: 98  F (36.7  C)  Resp: 18  Weight: 48.9 kg (107 lb 12.8 oz)  SpO2: 98 %      Physical Exam  Vitals and nursing note reviewed.   Constitutional:       Appearance: She is not ill-appearing.   HENT:      Head: Normocephalic.      Nose: Nose normal.   Eyes:      Conjunctiva/sclera: Conjunctivae normal.   Cardiovascular:      Rate and Rhythm: Normal rate.   Pulmonary:      Effort: Pulmonary effort is normal.   Abdominal:      General: Abdomen is flat. Bowel sounds are normal. There is no distension.      Palpations: Abdomen is soft.      Tenderness: There is no abdominal tenderness. There is no guarding or rebound.      Comments: Nonsurgical abdomen with no rebound or guarding.  Hepatosplenomegaly.  Soft with no reproducible tenderness including no pain with deep palpation left lower quadrant.   Genitourinary:     Comments: Rectal examination reveals tan-colored stool that soft.  No impaction.  Normal rectal tone.  No defined rectal masses on digital exam.  Skin:     General: Skin is warm.      Capillary Refill: Capillary refill takes less than 2 seconds.      Findings: No rash.   Neurological:      General: No focal deficit present.      Mental Status: She is alert and oriented to person, place, and time.   Psychiatric:         Mood and Affect: Mood normal.         Behavior: Behavior normal.         ED Course                 Procedures                  Results for orders placed or performed during the hospital encounter of 08/19/22 (from the past 24 hour(s))   XR Abdomen 2 Views    Narrative    ABDOMEN TWO-THREE VIEW  8/19/2022 9:42 AM     HISTORY: Constipation versus obstruction    COMPARISON: None.    FINDINGS: Large  amount of stool. No free air. There are no air filled  distended loops of small bowel. The colon is not distended. The lung  bases are unremarkable. Severe scoliosis.      Impression    IMPRESSION: Nonobstructed bowel gas pattern.    CHRISTOPHE HUDDLESTON MD         SYSTEM ID:  [id number]       Medications - No data to display    Assessments & Plan (with Medical Decision Making)   Coby is 82 years of age.  She presents with no bowel movement x7 days.  Her only surgery in the past has been a hysterectomy.  She is never had any problems with adhesions or small bowel obstruction.  Her bowel habits usually are once daily.  States is highly unusual.  Yesterday she did have some transient pain in the left lower quadrant but it is now subsided.  She was worried that that might signal recurrence of her diverticulitis.  She has had no further pain and she has not had diarrhea which is more typical for her diverticular presentation.  Examination noted a nonsurgical abdomen and she had no pain palpable in the left lower quadrant.  There is no guarding or rebound and she still had active bowel sounds but there were diminished.  Her rectal exam did not reveal any impaction but there was a lot of amount of soft stool.  We attempted to get bowel cleansing with both a tapwater enema and a pink lady but she could not retain the fluid so was a failed attempt in the ED.    Patient will be discharged home with a MiraLAX Gatorade prep to do closely tablets.  If no if no success in the next 12 to 24 hours she can repeat.  If it still is unsuccessful in passing large quantity of stool then she should return to the clinic or ED.           I have reviewed the nursing notes.    I have reviewed the findings, diagnosis, plan and need for follow up with the patient.      New Prescriptions    No medications on file       Final diagnoses:   Slow transit constipation       8/19/2022   [facility] EMERGENCY DEPT     Shad Will  DO Pasquale  08/19/22 125

## 2022-08-20 DIAGNOSIS — I10 HYPERTENSION GOAL BP (BLOOD PRESSURE) < 140/90: ICD-10-CM

## 2022-08-23 RX ORDER — LISINOPRIL 10 MG/1
TABLET ORAL
Qty: 90 TABLET | Refills: 0 | Status: SHIPPED | OUTPATIENT
Start: 2022-08-23 | End: 2022-10-20

## 2022-08-23 NOTE — TELEPHONE ENCOUNTER
Routing refill request to provider for review/approval because:  Labs not current:  JUAN Mohan BSN, RN

## 2022-08-24 ENCOUNTER — TELEPHONE (OUTPATIENT)
Dept: FAMILY MEDICINE | Facility: CLINIC | Age: 82
End: 2022-08-24

## 2022-08-24 NOTE — TELEPHONE ENCOUNTER
FYI - Status Update    Who is Calling: patient    Update: Patient stating she was in the ED last Friday, 8/19 for constipation. Looking at notes from ED she was to take miralax. Patient stating she did do the miralax with the Gatorade and did have a fair amount of stool. But not much since and has not taken anymore of the Miralax. Did instruct to take further dosing as noted with the directions on the Miralax container, and to increase her fluid intake. Patient denying any severe cramping, pain or nausea. Patient will update on Friday if no further increase in stool.    Does caller want a call/response back: No

## 2022-09-10 ENCOUNTER — HEALTH MAINTENANCE LETTER (OUTPATIENT)
Age: 82
End: 2022-09-10

## 2022-10-20 ENCOUNTER — OFFICE VISIT (OUTPATIENT)
Dept: FAMILY MEDICINE | Facility: CLINIC | Age: 82
End: 2022-10-20
Payer: COMMERCIAL

## 2022-10-20 VITALS
HEART RATE: 89 BPM | TEMPERATURE: 98 F | HEIGHT: 60 IN | OXYGEN SATURATION: 96 % | WEIGHT: 108.9 LBS | BODY MASS INDEX: 21.38 KG/M2 | DIASTOLIC BLOOD PRESSURE: 74 MMHG | SYSTOLIC BLOOD PRESSURE: 118 MMHG | RESPIRATION RATE: 16 BRPM

## 2022-10-20 DIAGNOSIS — I10 HYPERTENSION GOAL BP (BLOOD PRESSURE) < 140/90: ICD-10-CM

## 2022-10-20 DIAGNOSIS — Z13.6 CARDIOVASCULAR SCREENING; LDL GOAL LESS THAN 130: ICD-10-CM

## 2022-10-20 DIAGNOSIS — Z00.00 ENCOUNTER FOR MEDICARE ANNUAL WELLNESS EXAM: Primary | ICD-10-CM

## 2022-10-20 DIAGNOSIS — M81.0 AGE-RELATED OSTEOPOROSIS WITHOUT CURRENT PATHOLOGICAL FRACTURE: ICD-10-CM

## 2022-10-20 LAB
ALBUMIN SERPL-MCNC: 4 G/DL (ref 3.4–5)
ALP SERPL-CCNC: 72 U/L (ref 40–150)
ALT SERPL W P-5'-P-CCNC: 17 U/L (ref 0–50)
ANION GAP SERPL CALCULATED.3IONS-SCNC: 4 MMOL/L (ref 3–14)
AST SERPL W P-5'-P-CCNC: 15 U/L (ref 0–45)
BILIRUB SERPL-MCNC: 0.4 MG/DL (ref 0.2–1.3)
BUN SERPL-MCNC: 19 MG/DL (ref 7–30)
CALCIUM SERPL-MCNC: 9 MG/DL (ref 8.5–10.1)
CHLORIDE BLD-SCNC: 105 MMOL/L (ref 94–109)
CHOLEST SERPL-MCNC: 184 MG/DL
CO2 SERPL-SCNC: 28 MMOL/L (ref 20–32)
CREAT SERPL-MCNC: 0.78 MG/DL (ref 0.52–1.04)
FASTING STATUS PATIENT QL REPORTED: NO
GFR SERPL CREATININE-BSD FRML MDRD: 76 ML/MIN/1.73M2
GLUCOSE BLD-MCNC: 92 MG/DL (ref 70–99)
HDLC SERPL-MCNC: 48 MG/DL
LDLC SERPL CALC-MCNC: 109 MG/DL
NONHDLC SERPL-MCNC: 136 MG/DL
POTASSIUM BLD-SCNC: 4.7 MMOL/L (ref 3.4–5.3)
PROT SERPL-MCNC: 7.1 G/DL (ref 6.8–8.8)
SODIUM SERPL-SCNC: 137 MMOL/L (ref 133–144)
TRIGL SERPL-MCNC: 134 MG/DL

## 2022-10-20 PROCEDURE — 36415 COLL VENOUS BLD VENIPUNCTURE: CPT | Performed by: FAMILY MEDICINE

## 2022-10-20 PROCEDURE — 90662 IIV NO PRSV INCREASED AG IM: CPT | Performed by: FAMILY MEDICINE

## 2022-10-20 PROCEDURE — 80061 LIPID PANEL: CPT | Performed by: FAMILY MEDICINE

## 2022-10-20 PROCEDURE — 80053 COMPREHEN METABOLIC PANEL: CPT | Performed by: FAMILY MEDICINE

## 2022-10-20 PROCEDURE — G0008 ADMIN INFLUENZA VIRUS VAC: HCPCS | Performed by: FAMILY MEDICINE

## 2022-10-20 PROCEDURE — 99213 OFFICE O/P EST LOW 20 MIN: CPT | Mod: 25 | Performed by: FAMILY MEDICINE

## 2022-10-20 PROCEDURE — G0439 PPPS, SUBSEQ VISIT: HCPCS | Performed by: FAMILY MEDICINE

## 2022-10-20 RX ORDER — LISINOPRIL 10 MG/1
10 TABLET ORAL DAILY
Qty: 90 TABLET | Refills: 3 | Status: SHIPPED | OUTPATIENT
Start: 2022-11-10 | End: 2023-04-27

## 2022-10-20 ASSESSMENT — ENCOUNTER SYMPTOMS
SHORTNESS OF BREATH: 0
ARTHRALGIAS: 1
HEMATURIA: 0
HEARTBURN: 1
SORE THROAT: 0
DYSURIA: 0
COUGH: 0
DIZZINESS: 0
HEADACHES: 0
PALPITATIONS: 0
CONSTIPATION: 1
FEVER: 0
BREAST MASS: 0
DIARRHEA: 1
PARESTHESIAS: 0
JOINT SWELLING: 0
WEAKNESS: 0
ABDOMINAL PAIN: 0
FREQUENCY: 1
NAUSEA: 0
HEMATOCHEZIA: 0
MYALGIAS: 1
CHILLS: 1
NERVOUS/ANXIOUS: 0
EYE PAIN: 0

## 2022-10-20 ASSESSMENT — PAIN SCALES - GENERAL: PAINLEVEL: NO PAIN (0)

## 2022-10-20 ASSESSMENT — ACTIVITIES OF DAILY LIVING (ADL): CURRENT_FUNCTION: NO ASSISTANCE NEEDED

## 2022-10-20 NOTE — PROGRESS NOTES
"SUBJECTIVE:   Coby is a 82 year old who presents for Preventive Visit.      Patient has been advised of split billing requirements and indicates understanding: Yes  Are you in the first 12 months of your Medicare coverage?  No    Healthy Habits:     In general, how would you rate your overall health?  Good    Frequency of exercise:  4-5 days/week    Duration of exercise:  15-30 minutes    Do you usually eat at least 4 servings of fruit and vegetables a day, include whole grains    & fiber and avoid regularly eating high fat or \"junk\" foods?  No    Taking medications regularly:  Yes    Medication side effects:  None    Ability to successfully perform activities of daily living:  No assistance needed    Home Safety:  No safety concerns identified    Hearing Impairment:  No hearing concerns    In the past 6 months, have you been bothered by leaking of urine? Yes    In general, how would you rate your overall mental or emotional health?  Good      PHQ-2 Total Score: 0    Additional concerns today:  Yes    Do you feel safe in your environment? Yes    Have you ever done Advance Care Planning? (For example, a Health Directive, POLST, or a discussion with a medical provider or your loved ones about your wishes): Yes, advance care planning is on file.       Fall risk  Fallen 2 or more times in the past year?: No  Any fall with injury in the past year?: No    Cognitive Screening   1) Repeat 3 items (Leader, Season, Table)    2) Clock draw: NORMAL  3) 3 item recall: Recalls 3 objects  Results: 3 items recalled: COGNITIVE IMPAIRMENT LESS LIKELY    Mini-CogTM Copyright ALEJANDRO Hatfield. Licensed by the author for use in [facility]; reprinted with permission (joseph@.[email or web address]). All rights reserved.      Do you have sleep apnea, excessive snoring or daytime drowsiness?: no    Reviewed and updated as needed this visit by clinical staff   Tobacco  Allergies  Meds              Reviewed and updated as needed this visit by " Provider                 Social History     Tobacco Use     Smoking status: Never     Smokeless tobacco: Never   Substance Use Topics     Alcohol use: Yes     Comment:  1 glass of wine with dinner          Alcohol Use 10/20/2022   Prescreen: >3 drinks/day or >7 drinks/week? No   Prescreen: >3 drinks/day or >7 drinks/week? -               Current providers sharing in care for this patient include:   Patient Care Team:  Bandar Will MD as PCP - General (Family Practice)  Bandar Will MD as Assigned PCP  Joaquín Aguilar PA-C as Assigned Musculoskeletal Provider    The following health maintenance items are reviewed in Epic and correct as of today:  Health Maintenance   Topic Date Due     HEPATITIS B IMMUNIZATION (1 of 3 - 3-dose series) Never done     ZOSTER IMMUNIZATION (1 of 2) Never done     MEDICARE ANNUAL WELLNESS VISIT  01/25/2022     COVID-19 Vaccine (5 - Booster for Moderna series) 07/22/2022     INFLUENZA VACCINE (1) 09/01/2022     ANNUAL REVIEW OF HM ORDERS  10/26/2022     FALL RISK ASSESSMENT  10/20/2023     ADVANCE CARE PLANNING  01/25/2026     DTAP/TDAP/TD IMMUNIZATION (2 - Td or Tdap) 05/23/2026     DEXA  12/18/2034     PHQ-2 (once per calendar year)  Completed     Pneumococcal Vaccine: 65+ Years  Completed     IPV IMMUNIZATION  Aged Out     MENINGITIS IMMUNIZATION  Aged Out     MAMMO SCREENING  Discontinued     Labs reviewed in [facility]  Mammogram Screening: Mammogram Screening - Patient over age 75, has elected to continue with screening.    Will set up in a couple of months.  Pertinent mammograms are reviewed under the imaging tab.    Review of Systems   Constitutional: Positive for chills. Negative for fever.   HENT: Positive for hearing loss. Negative for congestion, ear pain and sore throat.    Eyes: Negative for pain and visual disturbance.   Respiratory: Negative for cough and shortness of breath.    Cardiovascular: Negative for chest pain, palpitations and peripheral edema.  "  Gastrointestinal: Positive for constipation, diarrhea and heartburn. Negative for abdominal pain, hematochezia and nausea.   Breasts:  Negative for tenderness, breast mass and discharge.   Genitourinary: Positive for frequency and urgency. Negative for dysuria, genital sores, hematuria, pelvic pain, vaginal bleeding and vaginal discharge.   Musculoskeletal: Positive for arthralgias and myalgias. Negative for joint swelling.   Skin: Negative for rash.   Neurological: Negative for dizziness, weakness, headaches and paresthesias.   Psychiatric/Behavioral: Negative for mood changes. The patient is not nervous/anxious.          OBJECTIVE:   /74 (BP Location: Right arm, Patient Position: Sitting, Cuff Size: Adult Regular)   Pulse 89   Temp 98  F (36.7  C) (Temporal)   Resp 16   Ht 1.536 m (5' 0.47\")   Wt 49.4 kg (108 lb 14.4 oz)   LMP  (LMP Unknown)   SpO2 96%   BMI 20.94 kg/m   Estimated body mass index is 20.94 kg/m  as calculated from the following:    Height as of this encounter: 1.536 m (5' 0.47\").    Weight as of this encounter: 49.4 kg (108 lb 14.4 oz).  Physical Exam  GENERAL: healthy, alert and no distress  EYES: Eyes grossly normal to inspection, PERRL and conjunctivae and sclerae normal  HENT: ear canals and TM's normal, nose and mouth without ulcers or lesions  NECK: no adenopathy, no asymmetry, masses, or scars and thyroid normal to palpation  RESP: lungs clear to auscultation - no rales, rhonchi or wheezes  CV: regular rate and rhythm, normal S1 S2, no S3 or S4, no murmur, click or rub, no peripheral edema and peripheral pulses strong  ABDOMEN: soft, nontender, no hepatosplenomegaly, no masses and bowel sounds normal  MS: no gross musculoskeletal defects noted, no edema  SKIN: no suspicious lesions or rashes  NEURO: Normal strength and tone, mentation intact and speech normal  PSYCH: mentation appears normal, affect normal/bright    Diagnostic Test Results:  Labs reviewed in Epic  Results for " orders placed or performed in visit on 10/20/22 (from the past 24 hour(s))   Comprehensive metabolic panel (BMP + Alb, Alk Phos, ALT, AST, Total. Bili, TP)   Result Value Ref Range    Sodium 137 133 - 144 mmol/L    Potassium 4.7 3.4 - 5.3 mmol/L    Chloride 105 94 - 109 mmol/L    Carbon Dioxide (CO2) 28 20 - 32 mmol/L    Anion Gap 4 3 - 14 mmol/L    Urea Nitrogen 19 7 - 30 mg/dL    Creatinine 0.78 0.52 - 1.04 mg/dL    Calcium 9.0 8.5 - 10.1 mg/dL    Glucose 92 70 - 99 mg/dL    Alkaline Phosphatase 72 40 - 150 U/L    AST 15 0 - 45 U/L    ALT 17 0 - 50 U/L    Protein Total 7.1 6.8 - 8.8 g/dL    Albumin 4.0 3.4 - 5.0 g/dL    Bilirubin Total 0.4 0.2 - 1.3 mg/dL    GFR Estimate 76 >60 mL/min/1.73m2   Lipid panel reflex to direct LDL Fasting   Result Value Ref Range    Cholesterol 184 <200 mg/dL    Triglycerides 134 <150 mg/dL    Direct Measure HDL 48 (L) >=50 mg/dL    LDL Cholesterol Calculated 109 (H) <=100 mg/dL    Non HDL Cholesterol 136 (H) <130 mg/dL    Patient Fasting > 8hrs? No     Narrative    Cholesterol  Desirable:  <200 mg/dL    Triglycerides  Normal:  Less than 150 mg/dL  Borderline High:  150-199 mg/dL  High:  200-499 mg/dL  Very High:  Greater than or equal to 500 mg/dL    Direct Measure HDL  Female:  Greater than or equal to 50 mg/dL   Male:  Greater than or equal to 40 mg/dL    LDL Cholesterol  Desirable:  <100mg/dL  Above Desirable:  100-129 mg/dL   Borderline High:  130-159 mg/dL   High:  160-189 mg/dL   Very High:  >= 190 mg/dL    Non HDL Cholesterol  Desirable:  130 mg/dL  Above Desirable:  130-159 mg/dL  Borderline High:  160-189 mg/dL  High:  190-219 mg/dL  Very High:  Greater than or equal to 220 mg/dL       ASSESSMENT / PLAN:   (Z00.00) Encounter for Medicare annual wellness exam  (primary encounter diagnosis)  Comment: Very healthy very active.  Plan:     (M81.0) Age-related osteoporosis without current pathological fracture  Comment: She has been on Fosamax for at least 2 years probably close to  "3 years now.  We will have her continue for a while she will get a DEXA scan set up in the next 6 months or so.  Plan: DX Hip/Pelvis/Spine            (Z13.6) CARDIOVASCULAR SCREENING; LDL GOAL LESS THAN 130  Comment: We will notify her of results  Plan: Lipid panel reflex to direct LDL Fasting            (I10) Hypertension goal BP (blood pressure) < 140/90  Comment: On lisinopril we will continue on  Plan: Comprehensive metabolic panel (BMP + Alb, Alk         Phos, ALT, AST, Total. Bili, TP), lisinopril         (ZESTRIL) 10 MG tablet                    COUNSELING:  Reviewed preventive health counseling, as reflected in patient instructions       Healthy diet/nutrition       Vision screening    Estimated body mass index is 20.94 kg/m  as calculated from the following:    Height as of this encounter: 1.536 m (5' 0.47\").    Weight as of this encounter: 49.4 kg (108 lb 14.4 oz).    Weight management plan noted, stable and monitoring    She reports that she has never smoked. She has never used smokeless tobacco.      Appropriate preventive services were discussed with this patient, including applicable screening as appropriate for cardiovascular disease, diabetes, osteopenia/osteoporosis, and glaucoma.  As appropriate for age/gender, discussed screening for colorectal cancer, prostate cancer, breast cancer, and cervical cancer. Checklist reviewing preventive services available has been given to the patient.    Reviewed patients plan of care and provided an AVS. The Basic Care Plan (routine screening as documented in Health Maintenance) for Coby meets the Care Plan requirement. This Care Plan has been established and reviewed with the Patient.    Counseling Resources:  ATP IV Guidelines  Pooled Cohorts Equation Calculator  Breast Cancer Risk Calculator  Breast Cancer: Medication to Reduce Risk  FRAX Risk Assessment  ICSI Preventive Guidelines  Dietary Guidelines for Americans, 2010  USDA's MyPlate  ASA " Prophylaxis  Lung CA Screening    Bandar Will MD  [facility]    Identified Health Risks:

## 2022-10-20 NOTE — PATIENT INSTRUCTIONS
Patient Education   Personalized Prevention Plan  You are due for the preventive services outlined below.  Your care team is available to assist you in scheduling these services.  If you have already completed any of these items, please share that information with your care team to update in your medical record.  Health Maintenance Due   Topic Date Due     Hepatitis B Vaccine (1 of 3 - 3-dose series) Never done     Zoster (Shingles) Vaccine (1 of 2) Never done     COVID-19 Vaccine (5 - Booster for Moderna series) 07/22/2022       Understanding USDA MyPlate  The USDA has guidelines to help you make healthy food choices. These are called MyPlate. MyPlate shows the food groups that make up healthy meals using the image of a place setting. Before you eat, think about the healthiest choices for what to put on your plate or in your cup or bowl. To learn more about building a healthy plate, visit www.choosemyplate.gov.    The food groups    Fruits. Any fruit or 100% fruit juice counts as part of the Fruit Group. Fruits may be fresh, canned, frozen, or dried, and may be whole, cut-up, or pureed. Make 1/2 of your plate fruits and vegetables.    Vegetables. Any vegetable or 100% vegetable juice counts as a member of the Vegetable Group. Vegetables may be fresh, frozen, canned, or dried. They can be served raw or cooked and may be whole, cut-up, or mashed. Make 1/2 of your plate fruits and vegetables.    Grains. All foods made from grains are part of the Grains Group. These include wheat, rice, oats, cornmeal, and barley. Grains are often used to make foods such as bread, pasta, oatmeal, cereal, tortillas, and grits. Grains should be no more than 1/4 of your plate. At least half of your grains should be whole grains.    Protein. This group includes meat, poultry, seafood, beans and peas, eggs, processed soy products (such as tofu), nuts (including nut butters), and seeds. Make protein choices no more than 1/4 of your plate.  Meat and poultry choices should be lean or low fat.    Dairy. The Dairy Group includes all fluid milk products and foods made from milk that contain calcium, such as yogurt and cheese. (Foods that have little calcium, such as cream, butter, and cream cheese, are not part of this group.) Most dairy choices should be low-fat or fat-free.    Oils. Oils aren't a food group, but they do contain essential nutrients. However it's important to watch your intake of oils. These are fats that are liquid at room temperature. They include canola, corn, olive, soybean, vegetable, and sunflower oil. Foods that are mainly oil include mayonnaise, certain salad dressings, and soft margarines. You likely already get your daily oil allowance from the foods you eat.  Things to limit  Eating healthy also means limiting these things in your diet:       Salt (sodium). Many processed foods have a lot of sodium. To keep sodium intake down, eat fresh vegetables, meats, poultry, and seafood when possible. Purchase low-sodium, reduced-sodium, or no-salt-added food products at the store. And don't add salt to your meals at home. Instead, season them with herbs and spices such as dill, oregano, cumin, and paprika. Or try adding flavor with lemon or lime zest and juice.    Saturated fat. Saturated fats are most often found in animal products such as beef, pork, and chicken. They are often solid at room temperature, such as butter. To reduce your saturated fat intake, choose leaner cuts of meat and poultry. And try healthier cooking methods such as grilling, broiling, roasting, or baking. For a simple lower-fat swap, use plain nonfat yogurt instead of mayonnaise when making potato salad or macaroni salad.    Added sugars. These are sugars added to foods. They are in foods such as ice cream, candy, soda, fruit drinks, sports drinks, energy drinks, cookies, pastries, jams, and syrups. Cut down on added sugars by sharing sweet treats with a family  member or friend. You can also choose fruit for dessert, and drink water or other unsweetened beverages.     [employer] last reviewed this educational content on 6/1/2020 2000-2021 [employer]. All rights reserved. This information is not intended as a substitute for professional medical care. Always follow your healthcare professional's instructions.          Urinary Incontinence, Female (Adult)   Urinary incontinence means loss of bladder control. This problem affects many women, especially as they get older. If you have incontinence, you may be embarrassed to ask for help. But know that this problem can be treated.   Types of Incontinence  There are different types of incontinence. Two of the main types are described here. You can have more than one type.     Stress incontinence. With this type, urine leaks when pressure (stress) is put on the bladder. This may happen when you cough, sneeze, or laugh. Stress incontinence most often occurs because the pelvic floor muscles that support the bladder and urethra are weak. This can happen after pregnancy and vaginal childbirth or a hysterectomy. It can also be due to excess body weight or hormone changes.    Urge incontinence (also called overactive bladder). With this type, a sudden urge to urinate is felt often. This may happen even though there may not be much urine in the bladder. The need to urinate often during the night is common. Urge incontinence most often occurs because of bladder spasms. This may be due to bladder irritation or infection. Damage to bladder nerves or pelvic muscles, constipation, and certain medicines can also lead to urge incontinence.  Treatment depends on the cause. Further evaluation is needed to find the type you have. This will likely include an exam and certain tests. Based on the results, you and your healthcare provider can then plan treatment. Until a diagnosis is made, the home care tips below can help ease  symptoms.   Home care    Do pelvic floor muscle exercises, if they are prescribed. The pelvic floor muscles help support the bladder and urethra. Many women find that their symptoms improve when doing special exercises that strengthen these muscles. To do the exercises, contract the muscles you would use to stop your stream of urine. But do this when you re not urinating. Hold for 10 seconds, then relax. Repeat 10 to 20 times in a row, at least 3 times a day. Your healthcare provider may give you other instructions for how to do the exercises and how often.    Keep a bladder diary. This helps track how often and how much you urinate over a set period of time. Bring this diary with you to your next visit with the provider. The information can help your provider learn more about your bladder problem.    Lose weight, if advised to by your provider. Extra weight puts pressure on the bladder. Your provider can help you create a weight-loss plan that s right for you. This may include exercising more and making certain diet changes.    Don't have foods and drinks that may irritate the bladder. These can include alcohol and caffeinated drinks.    Quit smoking. Smoking and other tobacco use can lead to a long-term (chronic) cough that strains the pelvic floor muscles. Smoking may also damage the bladder and urethra. Talk with your provider about treatments or methods you can use to quit smoking.    If drinking large amounts of fluid makes you have symptoms, you may be advised to limit your fluid intake. You may also be advised to drink most of your fluids during the day and to limit fluids at night.    If you re worried about urine leakage or accidents, you may wear absorbent pads to catch urine. Change the pads often. This helps reduce discomfort. It may also reduce the risk of skin or bladder infections.    Follow-up care  Follow up with your healthcare provider, or as directed. It may take some to find the right treatment  for your problem. But healthy lifestyle changes can be made right away. These include such things as exercising on a regular basis, eating a healthy diet, losing weight (if needed), and quitting smoking. Your treatment plan may include special therapies or medicines. Certain procedures or surgery may also be options. Talk about any questions you have with your provider.   When to seek medical advice  Call the healthcare provider right away if any of these occur:    Fever of 100.4 F (38 C) or higher, or as directed by your provider    Bladder pain or fullness    Belly swelling    Nausea or vomiting    Back pain    Weakness, dizziness, or fainting  [employer] last reviewed this educational content on 1/1/2020 2000-2021 [employer]. All rights reserved. This information is not intended as a substitute for professional medical care. Always follow your healthcare professional's instructions.

## 2022-11-02 ENCOUNTER — HOSPITAL ENCOUNTER (OUTPATIENT)
Dept: BONE DENSITY | Facility: CLINIC | Age: 82
Discharge: HOME | End: 2022-11-02
Attending: FAMILY MEDICINE
Payer: COMMERCIAL

## 2022-11-02 ENCOUNTER — HOSPITAL ENCOUNTER (OUTPATIENT)
Dept: MAMMOGRAPHY | Facility: CLINIC | Age: 82
Discharge: HOME | End: 2022-11-02
Attending: FAMILY MEDICINE
Payer: COMMERCIAL

## 2022-11-02 DIAGNOSIS — M81.0 AGE-RELATED OSTEOPOROSIS WITHOUT CURRENT PATHOLOGICAL FRACTURE: ICD-10-CM

## 2022-11-02 DIAGNOSIS — Z12.31 VISIT FOR SCREENING MAMMOGRAM: ICD-10-CM

## 2022-11-02 PROCEDURE — 77067 SCR MAMMO BI INCL CAD: CPT

## 2022-11-02 PROCEDURE — 77080 DXA BONE DENSITY AXIAL: CPT

## 2022-11-17 ENCOUNTER — TELEPHONE (OUTPATIENT)
Dept: FAMILY MEDICINE | Facility: CLINIC | Age: 82
End: 2022-11-17

## 2022-11-17 NOTE — TELEPHONE ENCOUNTER
Patient calling and wanting to make sure provider was aware of her Dexa scan results and that she should continue her Fosamax. Informed that she still has refills remaining on her prescription. Informed of providers note and that when she is due for a refill to have the pharmacy send us the request. Ryanne Josue LPN

## 2023-01-30 ENCOUNTER — TELEPHONE (OUTPATIENT)
Dept: FAMILY MEDICINE | Facility: CLINIC | Age: 83
End: 2023-01-30
Payer: COMMERCIAL

## 2023-01-30 NOTE — TELEPHONE ENCOUNTER
Patient calling with worsening cough and congestion. Now also experiencing a sore throat. Symptoms started last week. Denies fever. Information given for urgent care, [facility] were she could be seen. Ryanne Josue LPN

## 2023-02-27 DIAGNOSIS — M81.0 AGE-RELATED OSTEOPOROSIS WITHOUT CURRENT PATHOLOGICAL FRACTURE: ICD-10-CM

## 2023-02-27 NOTE — LETTER
[address] [address]  [phone]        March 2, 2023    Coby Damon  [address] [place] [address]          Dear Coby,    We are concerned about your health care.  We recently provided you with a medication refill.  Many medications require routine follow-up with your Doctor.      At this time we ask that you schedule a routine office visit with a  physician to establish care.  Please call the clinic at [phone] Option 1 to schedule.     Your prescription:  Has been refilled for 1 time so you may have time for the above noted appointment.      Thank you,      Sincerely,        Your [facility] team.

## 2023-02-28 NOTE — TELEPHONE ENCOUNTER
"Requested Prescriptions   Pending Prescriptions Disp Refills    alendronate (FOSAMAX) 70 MG tablet 12 tablet 3     Sig: Take 1 tablet (70 mg) by mouth every 7 days       Bisphosphonates Failed - 2/27/2023  9:57 AM        Failed - Recent (12 mo) or future (30 days) visit within the authorizing provider's specialty     Patient has had an office visit with the authorizing provider or a provider within the authorizing providers department within the previous 12 mos or has a future within next 30 days. See \"Patient Info\" tab in inbasket, or \"Choose Columns\" in Meds & Orders section of the refill encounter.              Passed - Dexa on file within past 2 years     Please review last Dexa result.           Passed - Medication is active on med list        Passed - Patient is age 18 or older        Passed - Normal serum creatinine on file within past 12 months     Recent Labs   Lab Test 10/20/22  1145   CR 0.78       Ok to refill medication if creatinine is low                 "

## 2023-03-01 RX ORDER — ALENDRONATE SODIUM 70 MG/1
70 TABLET ORAL
Qty: 12 TABLET | Refills: 0 | Status: SHIPPED | OUTPATIENT
Start: 2023-03-01 | End: 2023-04-27

## 2023-03-02 NOTE — TELEPHONE ENCOUNTER
Medication refilled x1.  Needs appointment for further refills.  Will have staff notify patient.    Feliz Kim MD

## 2023-04-27 ENCOUNTER — OFFICE VISIT (OUTPATIENT)
Dept: FAMILY MEDICINE | Facility: CLINIC | Age: 83
End: 2023-04-27
Payer: COMMERCIAL

## 2023-04-27 VITALS
TEMPERATURE: 97.9 F | DIASTOLIC BLOOD PRESSURE: 70 MMHG | SYSTOLIC BLOOD PRESSURE: 128 MMHG | BODY MASS INDEX: 20.57 KG/M2 | WEIGHT: 107 LBS | HEART RATE: 92 BPM | OXYGEN SATURATION: 97 %

## 2023-04-27 DIAGNOSIS — E78.5 HYPERLIPIDEMIA LDL GOAL <130: ICD-10-CM

## 2023-04-27 DIAGNOSIS — I10 HYPERTENSION GOAL BP (BLOOD PRESSURE) < 140/90: Primary | ICD-10-CM

## 2023-04-27 DIAGNOSIS — E55.9 VITAMIN D DEFICIENCY: ICD-10-CM

## 2023-04-27 DIAGNOSIS — M46.1 SACROILIITIS, NOT ELSEWHERE CLASSIFIED (H): ICD-10-CM

## 2023-04-27 DIAGNOSIS — M81.0 AGE-RELATED OSTEOPOROSIS WITHOUT CURRENT PATHOLOGICAL FRACTURE: ICD-10-CM

## 2023-04-27 DIAGNOSIS — R53.83 OTHER FATIGUE: ICD-10-CM

## 2023-04-27 LAB
ERYTHROCYTE [DISTWIDTH] IN BLOOD BY AUTOMATED COUNT: 14.6 % (ref 10–15)
HCT VFR BLD AUTO: 35.7 % (ref 35–47)
HGB BLD-MCNC: 11.6 G/DL (ref 11.7–15.7)
MCH RBC QN AUTO: 31.6 PG (ref 26.5–33)
MCHC RBC AUTO-ENTMCNC: 32.5 G/DL (ref 31.5–36.5)
MCV RBC AUTO: 97 FL (ref 78–100)
PLATELET # BLD AUTO: 294 10E3/UL (ref 150–450)
RBC # BLD AUTO: 3.67 10E6/UL (ref 3.8–5.2)
TSH SERPL DL<=0.005 MIU/L-ACNC: 0.91 UIU/ML (ref 0.3–4.2)
WBC # BLD AUTO: 5.5 10E3/UL (ref 4–11)

## 2023-04-27 PROCEDURE — 84443 ASSAY THYROID STIM HORMONE: CPT | Performed by: PHYSICIAN ASSISTANT

## 2023-04-27 PROCEDURE — 99214 OFFICE O/P EST MOD 30 MIN: CPT | Performed by: PHYSICIAN ASSISTANT

## 2023-04-27 PROCEDURE — 82306 VITAMIN D 25 HYDROXY: CPT | Performed by: PHYSICIAN ASSISTANT

## 2023-04-27 PROCEDURE — 36415 COLL VENOUS BLD VENIPUNCTURE: CPT | Performed by: PHYSICIAN ASSISTANT

## 2023-04-27 PROCEDURE — 85027 COMPLETE CBC AUTOMATED: CPT | Performed by: PHYSICIAN ASSISTANT

## 2023-04-27 RX ORDER — ALENDRONATE SODIUM 70 MG/1
70 TABLET ORAL
Qty: 12 TABLET | Refills: 3 | Status: SHIPPED | OUTPATIENT
Start: 2023-04-27 | End: 2024-01-10

## 2023-04-27 RX ORDER — LISINOPRIL 10 MG/1
10 TABLET ORAL DAILY
Qty: 90 TABLET | Refills: 3 | Status: SHIPPED | OUTPATIENT
Start: 2023-04-27 | End: 2024-01-10

## 2023-04-27 ASSESSMENT — PAIN SCALES - GENERAL: PAINLEVEL: SEVERE PAIN (6)

## 2023-04-27 NOTE — PROGRESS NOTES
Chloe Tenorio is a 83 year old, presenting for the following health issues:  Recheck Medication        4/27/2023     1:31 PM   Additional Questions   Roomed by Yoly MARTINEZ     History of Present Illness       Hypertension: She presents for follow up of hypertension.  She does not check blood pressure  regularly outside of the clinic.  She does not follow a low salt diet.     Reason for visit:  Medication    She eats 0-1 servings of fruits and vegetables daily.She consumes 0 sweetened beverage(s) daily.She exercises with enough effort to increase her heart rate 9 or less minutes per day.  She exercises with enough effort to increase her heart rate 3 or less days per week.   She is taking medications regularly.    Patient presents today for follow-up of blood pressure. Numbers have been well controlled. Tolerating medications well without side effects. Monitoring salt in diet. Patient denies headaches, vision changes, chest pain, shortness of breath or paresthesias.    Does feel a lot more fatigued and often cold. This is new in the last several months. Sleeps really well at night but after lunch feels like she could take a nap every day.       Review of Systems   Constitutional, HEENT, cardiovascular, pulmonary, GI, , musculoskeletal, neuro, skin, endocrine and psych systems are negative, except as otherwise noted.      Objective    /70   Pulse 92   Temp 97.9  F (36.6  C) (Tympanic)   Wt 48.5 kg (107 lb)   LMP  (LMP Unknown)   SpO2 97%   BMI 20.57 kg/m    Body mass index is 20.57 kg/m .  Physical Exam   GENERAL: healthy, alert and no distress  HENT: ear canals and TM's normal, nose and mouth without ulcers or lesions  NECK: no adenopathy, no asymmetry, masses, or scars and thyroid normal to palpation  RESP: lungs clear to auscultation - no rales, rhonchi or wheezes  CV: regular rate and rhythm, normal S1 S2, no S3 or S4, no murmur, click or rub, no peripheral edema and peripheral pulses  strong  ABDOMEN: soft, nontender, no hepatosplenomegaly, no masses and bowel sounds normal  MS: no gross musculoskeletal defects noted, no edema  SKIN: no suspicious lesions or rashes  PSYCH: mentation appears normal, affect normal/bright      Assessment & Plan     Hypertension goal BP (blood pressure) < 140/90  Blood pressure stable. Continue current medications without change.  - lisinopril (ZESTRIL) 10 MG tablet; Take 1 tablet (10 mg) by mouth daily    Hyperlipidemia LDL goal <130  Stable - labs up to date.     Sacroiliitis, not elsewhere classified (H)  Mildly bothersome at times but no change from baseline.     Other fatigue  Labs ordered to further evaluate.   - CBC with platelets; Future  - TSH with free T4 reflex; Future  - CBC with platelets  - TSH with free T4 reflex    Vitamin D deficiency  - Vitamin D Deficiency; Future  - Vitamin D Deficiency    Age-related osteoporosis without current pathological fracture  - alendronate (FOSAMAX) 70 MG tablet; Take 1 tablet (70 mg) by mouth every 7 days    The patient indicates understanding of these issues and agrees with the plan.    CHUCK To [facility]

## 2023-04-28 LAB — DEPRECATED CALCIDIOL+CALCIFEROL SERPL-MC: 73 UG/L (ref 20–75)

## 2023-05-12 ENCOUNTER — TELEPHONE (OUTPATIENT)
Dept: FAMILY MEDICINE | Facility: CLINIC | Age: 83
End: 2023-05-12
Payer: COMMERCIAL

## 2023-05-12 NOTE — TELEPHONE ENCOUNTER
FYI - Status Update    Who is Calling: patient    Update: Patient is wasking what she needs to do in order to resume her spine injections that hshe used to receive through pain management and they referred her to another provider. Please call patient back with further instruction.     Does caller want a call/response back: Yes     Could we send this information to you in [employer] or would you prefer to receive a phone call?:   Patient would prefer a phone call   Okay to leave a detailed message?: Yes at Home number on file [phone] (home)

## 2023-05-12 NOTE — TELEPHONE ENCOUNTER
Message left to return call to clinic. When call is returned, please inform of message below and direct to Neurosurgery for scheduling of the appointment , number for Neurosurgery scheduling is [phone]. Ryanne Josue LPN

## 2023-05-12 NOTE — TELEPHONE ENCOUNTER
Please let patient know that I would recommend a follow-up with neurosurgery. She has seen Joaquín Aguilar and had injections with Eleanor. They can continue orders for her. Should not need a new order as she has been seen by them in the past.    Stormy Taylor PA-C

## 2023-05-26 ENCOUNTER — OFFICE VISIT (OUTPATIENT)
Dept: NEUROSURGERY | Facility: CLINIC | Age: 83
End: 2023-05-26
Payer: COMMERCIAL

## 2023-05-26 VITALS
WEIGHT: 107 LBS | DIASTOLIC BLOOD PRESSURE: 58 MMHG | HEIGHT: 60 IN | BODY MASS INDEX: 21.01 KG/M2 | HEART RATE: 61 BPM | SYSTOLIC BLOOD PRESSURE: 130 MMHG

## 2023-05-26 DIAGNOSIS — M53.3 SI (SACROILIAC) JOINT DYSFUNCTION: Primary | ICD-10-CM

## 2023-05-26 PROCEDURE — 99213 OFFICE O/P EST LOW 20 MIN: CPT | Performed by: PHYSICIAN ASSISTANT

## 2023-05-26 ASSESSMENT — PAIN SCALES - GENERAL: PAINLEVEL: EXTREME PAIN (8)

## 2023-05-26 NOTE — PROGRESS NOTES
Neurosurgery Clinic  Neurosurgery followup:    HPI: Coby was last seen in 2021 and was referred for bilateral SI joint injections.  She has been receiving these every couple of years, with good symptomatic improvement.  She now again notes return of these similar symptoms.  She would like to try these injections again.  No new symptoms in the lower extremities.      Exam:  Constitutional:  Alert, well nourished, NAD.  HEENT: Normocephalic, atraumatic.   Pulm:  Without shortness of breath   CV:  No pitting edema of BLE.      Neurological:  Awake  Alert  Oriented x 3  Motor exam:        IP Q DF PF EHL  R   5  5   5   5    5  L   5  5   5   5    5     Reflexes are 2+ in the patellar and Achilles. There is no clonus. Downgoing Babinski.      Able to spontaneously move L/E bilaterally  Sensation intact throughout all L/E dermatomes     Incision:    Imaging:     A/P:    She is now having similar symptoms to what she had in 2021, when she was again referred for SI injections.  She typically gets good symptomatic improvement from these.  We will go ahead and repeat these injections.  If she does not improve symptomatically, we will obtain updated imaging on her lumbar spine.  She voiced agreement and understanding.    - Wean from brace per patient comfort     - Call the clinic at [phone] for increasing redness, swelling or pus draining from the incision, increased pain or any other questions and concerns.      Joaquín Aguilar PA-C  [facility] Neurosurgery  [address]  [address] [place] [address]    Tel [phone]  Pager [phone]      The use of Dragon/[employer] dictation services may have been used to construct the content in this note; any grammatical or spelling errors are non-intentional. Please contact the author of this note directly if you are in need of any clarification.

## 2023-05-26 NOTE — LETTER
5/26/2023         RE: Coby Damon  [address] [facility] [address]        Dear Colleague,    Thank you for referring your patient, Coby Damon, to the [facility]. Please see a copy of my visit note below.    Neurosurgery Clinic  Neurosurgery followup:    HPI: Coby was last seen in 2021 and was referred for bilateral SI joint injections.  She has been receiving these every couple of years, with good symptomatic improvement.  She now again notes return of these similar symptoms.  She would like to try these injections again.  No new symptoms in the lower extremities.      Exam:  Constitutional:  Alert, well nourished, NAD.  HEENT: Normocephalic, atraumatic.   Pulm:  Without shortness of breath   CV:  No pitting edema of BLE.      Neurological:  Awake  Alert  Oriented x 3  Motor exam:        IP Q DF PF EHL  R   5  5   5   5    5  L   5  5   5   5    5     Reflexes are 2+ in the patellar and Achilles. There is no clonus. Downgoing Babinski.      Able to spontaneously move L/E bilaterally  Sensation intact throughout all L/E dermatomes     Incision:    Imaging:     A/P:    She is now having similar symptoms to what she had in 2021, when she was again referred for SI injections.  She typically gets good symptomatic improvement from these.  We will go ahead and repeat these injections.  If she does not improve symptomatically, we will obtain updated imaging on her lumbar spine.  She voiced agreement and understanding.    - Wean from brace per patient comfort     - Call the clinic at [phone] for increasing redness, swelling or pus draining from the incision, increased pain or any other questions and concerns.      Joaquín Aguilar PA-C  [facility]surgery  [address]  [address] [place] [address]    Tel [phone]  Pager [phone]      The use of Dragon/[employer] dictation services may have been used to  construct the content in this note; any grammatical or spelling errors are non-intentional. Please contact the author of this note directly if you are in need of any clarification.        Again, thank you for allowing me to participate in the care of your patient.        Sincerely,        Joaquín Aguilar PA-C

## 2023-05-26 NOTE — NURSING NOTE
Coby Damon is a 83 year old female who presents for:  Chief Complaint   Patient presents with     Surgical Followup      Lower back pain         Initial Vitals:  /58   Pulse 61   Ht 5' (1.524 m)   Wt 107 lb (48.5 kg)   LMP  (LMP Unknown)   BMI 20.90 kg/m   Estimated body mass index is 20.9 kg/m  as calculated from the following:    Height as of this encounter: 5' (1.524 m).    Weight as of this encounter: 107 lb (48.5 kg).. Body surface area is 1.43 meters squared. BP completed using cuff size: regular  Extreme Pain (8)    Nursing Comments:     KAREN ORTEGA

## 2023-05-30 ENCOUNTER — TELEPHONE (OUTPATIENT)
Dept: PALLIATIVE MEDICINE | Facility: CLINIC | Age: 83
End: 2023-05-30
Payer: COMMERCIAL

## 2023-06-16 ENCOUNTER — HOSPITAL ENCOUNTER (OUTPATIENT)
Facility: CLINIC | Age: 83
Discharge: HOME | End: 2023-06-16
Attending: ANESTHESIOLOGY | Admitting: ANESTHESIOLOGY
Payer: COMMERCIAL

## 2023-06-16 ENCOUNTER — HOSPITAL ENCOUNTER (OUTPATIENT)
Dept: GENERAL RADIOLOGY | Facility: CLINIC | Age: 83
Discharge: HOME | End: 2023-06-16
Attending: ANESTHESIOLOGY | Admitting: ANESTHESIOLOGY
Payer: COMMERCIAL

## 2023-06-16 VITALS
OXYGEN SATURATION: 98 % | SYSTOLIC BLOOD PRESSURE: 138 MMHG | BODY MASS INDEX: 19.69 KG/M2 | DIASTOLIC BLOOD PRESSURE: 78 MMHG | TEMPERATURE: 97.7 F | HEIGHT: 60 IN | RESPIRATION RATE: 16 BRPM | WEIGHT: 100.31 LBS

## 2023-06-16 DIAGNOSIS — G89.29 CHRONIC SI JOINT PAIN: ICD-10-CM

## 2023-06-16 DIAGNOSIS — M53.3 CHRONIC SI JOINT PAIN: ICD-10-CM

## 2023-06-16 PROCEDURE — 250N000011 HC RX IP 250 OP 636: Performed by: ANESTHESIOLOGY

## 2023-06-16 PROCEDURE — 27096 INJECT SACROILIAC JOINT: CPT | Performed by: ANESTHESIOLOGY

## 2023-06-16 PROCEDURE — 999N000179 XR SURGERY CARM FLUORO LESS THAN 5 MIN W STILLS: Mod: TC

## 2023-06-16 PROCEDURE — 27096 INJECT SACROILIAC JOINT: CPT | Mod: 50 | Performed by: ANESTHESIOLOGY

## 2023-06-16 PROCEDURE — 250N000009 HC RX 250: Performed by: ANESTHESIOLOGY

## 2023-06-16 RX ORDER — BUPIVACAINE HYDROCHLORIDE 5 MG/ML
INJECTION, SOLUTION PERINEURAL PRN
Status: DISCONTINUED | OUTPATIENT
Start: 2023-06-16 | End: 2023-06-16 | Stop reason: HOSPADM

## 2023-06-16 RX ORDER — TRIAMCINOLONE ACETONIDE 40 MG/ML
INJECTION, SUSPENSION INTRA-ARTICULAR; INTRAMUSCULAR PRN
Status: DISCONTINUED | OUTPATIENT
Start: 2023-06-16 | End: 2023-06-16 | Stop reason: HOSPADM

## 2023-06-16 RX ORDER — IOPAMIDOL 612 MG/ML
INJECTION, SOLUTION INTRATHECAL PRN
Status: DISCONTINUED | OUTPATIENT
Start: 2023-06-16 | End: 2023-06-16 | Stop reason: HOSPADM

## 2023-06-16 ASSESSMENT — ACTIVITIES OF DAILY LIVING (ADL): ADLS_ACUITY_SCORE: 35

## 2023-06-16 NOTE — DISCHARGE INSTRUCTIONS

## 2023-06-16 NOTE — OP NOTE
CHIEF COMPLAINT:    1.SI joint dysfunction (Sacroilitis) and pain (724.6)      PROCEDURE:   Fluoroscopically-guided injection of the Bilateral  sacroiliac joints      PROCEDURE DETAILS: After written informed consent was obtained from the patient, the patient was escorted to the procedure room.  The patient was placed in the prone position.   A time out was conducted to verify patient identity, procedure to be performed, side, site, allergies and any special requirements.  The skin over the lumbosacral region was prepped and draped in normal sterile fashion. Fluoroscopy was used to identify the posteroinferior region of the sacroiliac joint.  The skin was anesthetized with 2 mL of 1% lidocaine with bicarbonate buffer.  Using fluoroscopic guidance, a 22 gauge, 3.5  Quincke spinal needle was advanced into the joint from an inferior approach.  After negative aspiration, 0.5 ml of Omnipaque contrast was injected showing intra-articular spread of contrast without evidence of intravascular spread.  2.0 mL of solution consisting of 20 mg of triamcinolone and 1.5 cc of 0.5% marcaine was injected slowly into the joint.     The patient was monitored with blood pressure and pulse oximetry machines with the assistance of an RN throughout the procedure.  The patient was alert and responsive to questions throughout the procedure.   The patient tolerated the procedure well and was observed in the post-procedural area.  The patient was dismissed without apparent complications.   Blood loss: Less than 5 cc.     DIAGNOSIS:  1.  Bilateral sacroilitis  2.  Bilateral sacral enthesopathy  3.  Lumbar scoliosis  PLAN:  1. Performed Bilateral   Sacroiliac joint injections.  2. Bilateral SI ligament injections over the sacrum   3.    She is done so well with these injections in the past with almost complete pain relief for 3 months that I think it is reasonable to utilize these periodically for pain management tool.  She does have all the  positive physical exam signs in her back with a positive Anni exam and a positive finger Arcadio's, and a posterior SI jump test that is positive.  She also has a positive anterior thrust test.  I would think that her scoliosis is the main pain generator but these SI joint injections have been the only thing that really have helped her.  I did briefly talk about considering platelet rich plasma injections into and around her SI joints but she does understand that these are not covered by insurance.          Sawyer Webster MD  Diplomate of the American Board of Anesthesiology, Pain Medicine

## 2023-10-04 ENCOUNTER — IMMUNIZATION (OUTPATIENT)
Dept: FAMILY MEDICINE | Facility: CLINIC | Age: 83
End: 2023-10-04
Payer: COMMERCIAL

## 2023-10-04 PROCEDURE — 90662 IIV NO PRSV INCREASED AG IM: CPT

## 2023-10-04 PROCEDURE — G0008 ADMIN INFLUENZA VIRUS VAC: HCPCS

## 2023-11-03 ENCOUNTER — HOSPITAL ENCOUNTER (OUTPATIENT)
Dept: MAMMOGRAPHY | Facility: CLINIC | Age: 83
Discharge: HOME | End: 2023-11-03
Attending: PHYSICIAN ASSISTANT | Admitting: PHYSICIAN ASSISTANT
Payer: COMMERCIAL

## 2023-11-03 DIAGNOSIS — Z12.31 VISIT FOR SCREENING MAMMOGRAM: ICD-10-CM

## 2023-11-03 PROCEDURE — 77067 SCR MAMMO BI INCL CAD: CPT

## 2023-12-10 ENCOUNTER — HEALTH MAINTENANCE LETTER (OUTPATIENT)
Age: 83
End: 2023-12-10

## 2024-01-10 ENCOUNTER — OFFICE VISIT (OUTPATIENT)
Dept: FAMILY MEDICINE | Facility: CLINIC | Age: 84
End: 2024-01-10
Payer: COMMERCIAL

## 2024-01-10 VITALS
SYSTOLIC BLOOD PRESSURE: 118 MMHG | RESPIRATION RATE: 18 BRPM | TEMPERATURE: 98.1 F | BODY MASS INDEX: 20.2 KG/M2 | DIASTOLIC BLOOD PRESSURE: 70 MMHG | HEART RATE: 97 BPM | WEIGHT: 107 LBS | OXYGEN SATURATION: 98 % | HEIGHT: 61 IN

## 2024-01-10 DIAGNOSIS — E78.5 HYPERLIPIDEMIA LDL GOAL <130: ICD-10-CM

## 2024-01-10 DIAGNOSIS — Z00.00 ENCOUNTER FOR MEDICARE ANNUAL WELLNESS EXAM: Primary | ICD-10-CM

## 2024-01-10 DIAGNOSIS — M46.1 SACROILIITIS, NOT ELSEWHERE CLASSIFIED (H): ICD-10-CM

## 2024-01-10 DIAGNOSIS — I10 HYPERTENSION GOAL BP (BLOOD PRESSURE) < 140/90: ICD-10-CM

## 2024-01-10 DIAGNOSIS — M81.0 AGE-RELATED OSTEOPOROSIS WITHOUT CURRENT PATHOLOGICAL FRACTURE: ICD-10-CM

## 2024-01-10 LAB
ANION GAP SERPL CALCULATED.3IONS-SCNC: 15 MMOL/L (ref 7–15)
BUN SERPL-MCNC: 15.7 MG/DL (ref 8–23)
CALCIUM SERPL-MCNC: 10 MG/DL (ref 8.8–10.2)
CHLORIDE SERPL-SCNC: 101 MMOL/L (ref 98–107)
CHOLEST SERPL-MCNC: 209 MG/DL
CREAT SERPL-MCNC: 0.78 MG/DL (ref 0.51–0.95)
DEPRECATED HCO3 PLAS-SCNC: 22 MMOL/L (ref 22–29)
EGFRCR SERPLBLD CKD-EPI 2021: 75 ML/MIN/1.73M2
ERYTHROCYTE [DISTWIDTH] IN BLOOD BY AUTOMATED COUNT: 13.3 % (ref 10–15)
FASTING STATUS PATIENT QL REPORTED: NO
GLUCOSE SERPL-MCNC: 90 MG/DL (ref 70–99)
HCT VFR BLD AUTO: 39.1 % (ref 35–47)
HDLC SERPL-MCNC: 55 MG/DL
HGB BLD-MCNC: 13 G/DL (ref 11.7–15.7)
LDLC SERPL CALC-MCNC: 122 MG/DL
MCH RBC QN AUTO: 33 PG (ref 26.5–33)
MCHC RBC AUTO-ENTMCNC: 33.2 G/DL (ref 31.5–36.5)
MCV RBC AUTO: 99 FL (ref 78–100)
NONHDLC SERPL-MCNC: 154 MG/DL
PLATELET # BLD AUTO: 322 10E3/UL (ref 150–450)
POTASSIUM SERPL-SCNC: 3.9 MMOL/L (ref 3.4–5.3)
RBC # BLD AUTO: 3.94 10E6/UL (ref 3.8–5.2)
SODIUM SERPL-SCNC: 138 MMOL/L (ref 135–145)
TRIGL SERPL-MCNC: 161 MG/DL
VIT D+METAB SERPL-MCNC: 67 NG/ML (ref 20–50)
WBC # BLD AUTO: 5.8 10E3/UL (ref 4–11)

## 2024-01-10 PROCEDURE — G0439 PPPS, SUBSEQ VISIT: HCPCS | Performed by: PHYSICIAN ASSISTANT

## 2024-01-10 PROCEDURE — 36415 COLL VENOUS BLD VENIPUNCTURE: CPT | Performed by: PHYSICIAN ASSISTANT

## 2024-01-10 PROCEDURE — 80061 LIPID PANEL: CPT | Performed by: PHYSICIAN ASSISTANT

## 2024-01-10 PROCEDURE — 82306 VITAMIN D 25 HYDROXY: CPT | Performed by: PHYSICIAN ASSISTANT

## 2024-01-10 PROCEDURE — 85027 COMPLETE CBC AUTOMATED: CPT | Performed by: PHYSICIAN ASSISTANT

## 2024-01-10 PROCEDURE — 80048 BASIC METABOLIC PNL TOTAL CA: CPT | Performed by: PHYSICIAN ASSISTANT

## 2024-01-10 PROCEDURE — 99213 OFFICE O/P EST LOW 20 MIN: CPT | Mod: 25 | Performed by: PHYSICIAN ASSISTANT

## 2024-01-10 RX ORDER — ALENDRONATE SODIUM 70 MG/1
70 TABLET ORAL
Qty: 12 TABLET | Refills: 3 | Status: SHIPPED | OUTPATIENT
Start: 2024-01-10

## 2024-01-10 RX ORDER — LISINOPRIL 10 MG/1
10 TABLET ORAL DAILY
Qty: 90 TABLET | Refills: 3 | Status: SHIPPED | OUTPATIENT
Start: 2024-01-10

## 2024-01-10 ASSESSMENT — ENCOUNTER SYMPTOMS
CONSTIPATION: 1
NERVOUS/ANXIOUS: 0
COUGH: 0
ABDOMINAL PAIN: 1
ARTHRALGIAS: 1
SORE THROAT: 0
DYSURIA: 0
FREQUENCY: 1
HEARTBURN: 1
SHORTNESS OF BREATH: 0
EYE PAIN: 0
MYALGIAS: 1
FEVER: 0
DIARRHEA: 1
CHILLS: 1
HEADACHES: 0
NAUSEA: 0
HEMATURIA: 0
WEAKNESS: 1
PALPITATIONS: 0
BREAST MASS: 0
PARESTHESIAS: 0
HEMATOCHEZIA: 0
JOINT SWELLING: 0
DIZZINESS: 0

## 2024-01-10 ASSESSMENT — PAIN SCALES - GENERAL: PAINLEVEL: MODERATE PAIN (4)

## 2024-01-10 ASSESSMENT — ACTIVITIES OF DAILY LIVING (ADL): CURRENT_FUNCTION: NO ASSISTANCE NEEDED

## 2024-01-10 NOTE — PROGRESS NOTES
The patient was counseled and encouraged to consider modifying their diet and eating habits. She was provided with information on recommended healthy diet options.  The patient was provided with written information regarding signs of hearing loss.  Information on urinary incontinence and treatment options given to patient.

## 2024-01-10 NOTE — PROGRESS NOTES
"SUBJECTIVE:   Coby is a 84 year old, presenting for the following:  Physical        1/10/2024    10:50 AM   Additional Questions   Roomed by Toya MARTINEZ       Are you in the first 12 months of your Medicare coverage?  No    Healthy Habits:     In general, how would you rate your overall health?  Good    Frequency of exercise:  4-5 days/week    Duration of exercise:  15-30 minutes    Do you usually eat at least 4 servings of fruit and vegetables a day, include whole grains    & fiber and avoid regularly eating high fat or \"junk\" foods?  No    Taking medications regularly:  Yes    Medication side effects:  None    Ability to successfully perform activities of daily living:  No assistance needed    Home Safety:  Lack of grab bars in the bathroom    Hearing Impairment:  Difficult to understand a speaker at a public meeting or Christian service and difficulty understanding speech on the telephone    In the past 6 months, have you been bothered by leaking of urine? Yes    In general, how would you rate your overall mental or emotional health?  Good    Additional concerns today:  No    Patient presents today for follow-up of blood pressure. Numbers have been well controlled. Tolerating medications well without side effects. Monitoring salt in diet. Patient denies headaches, vision changes, chest pain, shortness of breath or paresthesias.    Back more and more bothersome but thankfully does not interfere with sleep. Takes more breaks during the day. She has done injections but this seems to last less and less. Not really interested in other interventions at this time.     Today's PHQ-2 Score:       1/10/2024    10:49 AM   PHQ-2 ( 1999 [employer])   Q1: Little interest or pleasure in doing things 0   Q2: Feeling down, depressed or hopeless 0   PHQ-2 Score 0   Q1: Little interest or pleasure in doing things Not at all   Q2: Feeling down, depressed or hopeless Not at all   PHQ-2 Score 0     Have you ever done Advance Care " Planning? (For example, a Health Directive, POLST, or a discussion with a medical provider or your loved ones about your wishes): No, advance care planning information given to patient to review.  Patient declined advance care planning discussion at this time.       Fall risk  Fallen 2 or more times in the past year?: No  Any fall with injury in the past year?: No    Cognitive Screening   1) Repeat 3 items (Leader, Season, Table)    2) Clock draw: NORMAL  3) 3 item recall: Recalls 3 objects  Results: 3 items recalled: COGNITIVE IMPAIRMENT LESS LIKELY    Mini-CogTM Copyright S Liu. Licensed by the author for use in [facility]; reprinted with permission ([email or web address]). All rights reserved.      Do you have sleep apnea, excessive snoring or daytime drowsiness? : yes    Reviewed and updated as needed this visit by clinical staff   Tobacco  Allergies  Meds              Reviewed and updated as needed this visit by Provider                 Social History     Tobacco Use    Smoking status: Never    Smokeless tobacco: Never   Substance Use Topics    Alcohol use: Yes     Comment:  1 glass of wine with dinner              1/10/2024    10:49 AM   Alcohol Use   Prescreen: >3 drinks/day or >7 drinks/week? No     Do you have a current opioid prescription? No  Do you use any other controlled substances or medications that are not prescribed by a provider? None      Current providers sharing in care for this patient include:   Patient Care Team:  Stormy Taylor PA-C as PCP - General (Family Medicine)  Stormy Taylor PA-C as Assigned PCP  Joaquín Aguilar PA-C as Assigned Musculoskeletal Provider    The following health maintenance items are reviewed in Epic and correct as of today:  Health Maintenance   Topic Date Due    ZOSTER IMMUNIZATION (1 of 2) Never done    RSV VACCINE (Pregnancy & 60+) (1 - 1-dose 60+ series) Never done    COVID-19 Vaccine (5 - 2023-24 season) 09/01/2023    MEDICARE ANNUAL WELLNESS VISIT   10/20/2023    ANNUAL REVIEW OF HM ORDERS  10/20/2023    FALL RISK ASSESSMENT  01/10/2025    DTAP/TDAP/TD IMMUNIZATION (2 - Td or Tdap) 05/23/2026    ADVANCE CARE PLANNING  10/20/2027    DEXA  11/02/2037    PHQ-2 (once per calendar year)  Completed    INFLUENZA VACCINE  Completed    Pneumococcal Vaccine: 65+ Years  Completed    IPV IMMUNIZATION  Aged Out    HPV IMMUNIZATION  Aged Out    MENINGITIS IMMUNIZATION  Aged Out    RSV MONOCLONAL ANTIBODY  Aged Out    MAMMO SCREENING  Discontinued     BP Readings from Last 3 Encounters:   01/10/24 118/70   06/16/23 138/78   05/26/23 130/58    Wt Readings from Last 3 Encounters:   01/10/24 48.5 kg (107 lb)   06/16/23 45.5 kg (100 lb 5 oz)   05/26/23 48.5 kg (107 lb)                  Patient Active Problem List   Diagnosis    CARDIOVASCULAR SCREENING; LDL GOAL LESS THAN 130    Hypertension goal BP (blood pressure) < 140/90    Age-related osteoporosis without current pathological fracture    Hyperlipidemia LDL goal <130    Cervicalgia    Low back pain, unspecified back pain laterality, unspecified chronicity, with sciatica presence unspecified    Advance Care Planning    Scoliosis of thoracolumbar spine, unspecified scoliosis type    Diarrhea, unspecified type    Sacroiliitis, not elsewhere classified (H24)     Past Surgical History:   Procedure Laterality Date    COLONOSCOPY N/A 12/12/2014    Procedure: COLONOSCOPY;  Surgeon: Bandar Guidry MD;  Location: [facility]    HYSTERECTOMY, PAP NO LONGER INDICATED      INJECT EPIDURAL CERVICAL N/A 9/23/2015    Procedure: INJECT EPIDURAL CERVICAL;  Surgeon: Sawyer Webster MD;  Location: [facility]    INJECT EPIDURAL CERVICAL Right 11/8/2019    Procedure: Cervical 6-7 Spine Injection;  Surgeon: Sawyer Webster MD;  Location[facility]    INJECT EPIDURAL CERVICAL N/A 10/2/2020    Procedure: INJECTION, SPINE, CERVICAL 6-7, EPIDURAL;  Surgeon: Sawyer Webster MD;  Location[facility]    INJECT JOINT SACROILIAC Bilateral 10/24/2019    Procedure:  Bilateral Sacroiliac Joint Injections;  Surgeon: Sawyer Webster MD;  [facility]    INJECT JOINT SACROILIAC Bilateral 2020    Procedure: Bilaterl sacroiliac joint injections;  Surgeon: Sawyer Webster MD;  [facility]    INJECT JOINT SACROILIAC Bilateral 10/15/2021    Procedure: Fluoroscopically-guided injection of the Bilateral sacroiliac joints with Bilateral andre Sacroiliac joint ligaments infiltration over the sacrum;  Surgeon: Sawyer Webster MD;  [facility]    INJECT JOINT SACROILIAC Bilateral 2023    Procedure: Fluoroscopically-guided injection of the Bilateral sacroiliac joints;  Surgeon: Sawyer Webster MD;  [facility]    PHACOEMULSIFICATION WITH STANDARD INTRAOCULAR LENS IMPLANT Left 11/15/2018    Procedure: PHACOEMULSIFICATION WITH STANDARD INTRAOCULAR LENS IMPLANT LEFT EYE;  Surgeon: Rian Ford MD;  [facility]    PHACOEMULSIFICATION WITH STANDARD INTRAOCULAR LENS IMPLANT Right 2018    Procedure: PHACOEMULSIFICATION WITH STANDARD INTRAOCULAR LENS IMPLANT RIGHT EYE;  Surgeon: Rian Ford MD;  Location[facility]       Social History     Tobacco Use    Smoking status: Never    Smokeless tobacco: Never   Substance Use Topics    Alcohol use: Yes     Comment:  1 glass of wine with dinner      Family History   Problem Relation Age of Onset    Kidney Disease Mother          in 50's    Heart Disease Father          in his 60's    Colitis Father          Current Outpatient Medications   Medication Sig Dispense Refill    acetaminophen (TYLENOL) 325 MG tablet Take 325-650 mg by mouth every 6 hours as needed      alendronate (FOSAMAX) 70 MG tablet Take 1 tablet (70 mg) by mouth every 7 days 12 tablet 3    Cholecalciferol (VITAMIN D) 2000 UNITS tablet Take 2,000 Units by mouth daily      IBUPROFEN PO Take 400 mg by mouth every 6 hours as needed for moderate pain      latanoprost (XALATAN) 0.005 % ophthalmic solution       lisinopril (ZESTRIL) 10 MG  "tablet Take 1 tablet (10 mg) by mouth daily 90 tablet 3    Magnesium Oxide 250 MG TABS Take 1 tablet by mouth daily Patient takes only every other day.      Multiple Vitamins-Minerals (MULTIVITAMIN OR)       Probiotic Product (PROBIOTIC-10 PO)       vitamin B complex with vitamin C (STRESS TAB) tablet Take 1 tablet by mouth daily       Mammogram Screening - Patient over age 75, has elected to continue with screening.  Pertinent mammograms are reviewed under the imaging tab.    Review of Systems   Constitutional:  Positive for chills. Negative for fever.   HENT:  Positive for hearing loss. Negative for congestion, ear pain and sore throat.    Eyes:  Positive for visual disturbance. Negative for pain.   Respiratory:  Negative for cough and shortness of breath.    Cardiovascular:  Negative for chest pain, palpitations and peripheral edema.   Gastrointestinal:  Positive for abdominal pain, constipation, diarrhea and heartburn. Negative for hematochezia and nausea.   Breasts:  Positive for tenderness. Negative for breast mass and discharge.   Genitourinary:  Positive for frequency and urgency. Negative for dysuria, genital sores, hematuria, pelvic pain, vaginal bleeding and vaginal discharge.   Musculoskeletal:  Positive for arthralgias and myalgias. Negative for joint swelling.   Skin:  Negative for rash.   Neurological:  Positive for weakness. Negative for dizziness, headaches and paresthesias.   Psychiatric/Behavioral:  Negative for mood changes. The patient is not nervous/anxious.        OBJECTIVE:   /70   Pulse 97   Temp 98.1  F (36.7  C) (Temporal)   Resp 18   Ht 1.54 m (5' 0.63\")   Wt 48.5 kg (107 lb)   LMP  (LMP Unknown)   SpO2 98%   BMI 20.46 kg/m   Estimated body mass index is 20.46 kg/m  as calculated from the following:    Height as of this encounter: 1.54 m (5' 0.63\").    Weight as of this encounter: 48.5 kg (107 lb).  Physical Exam  GENERAL: healthy, alert and no distress  EYES: Eyes grossly " normal to inspection, PERRL and conjunctivae and sclerae normal  HENT: ear canals and TM's normal, nose and mouth without ulcers or lesions  NECK: no adenopathy, no asymmetry, masses, or scars and thyroid normal to palpation  RESP: lungs clear to auscultation - no rales, rhonchi or wheezes  CV: regular rate and rhythm, normal S1 S2, no S3 or S4, no murmur, click or rub, no peripheral edema and peripheral pulses strong  ABDOMEN: soft, nontender, no hepatosplenomegaly, no masses and bowel sounds normal  MS: no gross musculoskeletal defects noted, no edema  SKIN: no suspicious lesions or rashes  NEURO: Normal strength and tone, mentation intact and speech normal  PSYCH: mentation appears normal, affect normal/bright    Diagnostic Test Results:  Labs reviewed in Epic    ASSESSMENT / PLAN:   (Z00.00) Encounter for Medicare annual wellness exam  (primary encounter diagnosis)  Comment: Vaccinations reviewed and declined at this time.    (E78.5) Hyperlipidemia LDL goal <130  Plan: Basic metabolic panel  (Ca, Cl, CO2, Creat,         Gluc, K, Na, BUN)    (I10) Hypertension goal BP (blood pressure) < 140/90  Comment: Stable. Continue current medication without change.   Plan: Lipid panel reflex to direct LDL Fasting,         lisinopril (ZESTRIL) 10 MG tablet    (M81.0) Age-related osteoporosis without current pathological fracture  Plan: CBC with platelets, Vitamin D Deficiency,         alendronate (FOSAMAX) 70 MG tablet    (M46.1) Sacroiliitis, not elsewhere classified (H24)  Comment: Patient will continue supportive cares at this time. She will update me if symptoms worsening.     Patient has been advised of split billing requirements and indicates understanding: Yes      COUNSELING:  Reviewed preventive health counseling, as reflected in patient instructions        She reports that she has never smoked. She has never used smokeless tobacco.      Appropriate preventive services were discussed with this patient, including  applicable screening as appropriate for fall prevention, nutrition, physical activity, Tobacco-use cessation, weight loss and cognition.  Checklist reviewing preventive services available has been given to the patient.    Reviewed patients plan of care and provided an AVS. The Basic Care Plan (routine screening as documented in Health Maintenance) for Coby meets the Care Plan requirement. This Care Plan has been established and reviewed with the Patient.          Stormy Taylor PA-C  [facility]    Identified Health Risks:  The patient was counseled and encouraged to consider modifying their diet and eating habits. She was provided with information on recommended healthy diet options.  The patient was provided with written information regarding signs of hearing loss.  Information on urinary incontinence and treatment options given to patient.    I have reviewed Opioid Use Disorder and Substance Use Disorder risk factors and made any needed referrals.

## 2024-01-10 NOTE — PATIENT INSTRUCTIONS
Patient Education   Personalized Prevention Plan  You are due for the preventive services outlined below.  Your care team is available to assist you in scheduling these services.  If you have already completed any of these items, please share that information with your care team to update in your medical record.  Health Maintenance Due   Topic Date Due     Zoster (Shingles) Vaccine (1 of 2) Never done     RSV VACCINE (Pregnancy & 60+) (1 - 1-dose 60+ series) Never done     COVID-19 Vaccine (5 - 2023-24 season) 09/01/2023     Annual Wellness Visit  10/20/2023     ANNUAL REVIEW OF HM ORDERS  10/20/2023     Learning About Dietary Guidelines  What are the Dietary Guidelines for Americans?     Dietary Guidelines for Americans provide tips for eating well and staying healthy. This helps reduce the risk for long-term (chronic) diseases.  These guidelines recommend that you:  Eat and drink the right amount for you. The U.[place] government's food guide is called MyPlate. It can help you make your own well-balanced eating plan.  Try to balance your eating with your activity. This helps you stay at a healthy weight.  Drink alcohol in moderation, if at all.  Limit foods high in salt, saturated fat, trans fat, and added sugar.  These guidelines are from the U.S. Department of Agriculture and the U.S. Department of Health and Human Services. They are updated every 5 years.  What is MyPlate?  MyPlate is the U.S. government's food guide. It can help you make your own well-balanced eating plan. A balanced eating plan means that you eat enough, but not too much, and that your food gives you the nutrients you need to stay healthy.  MyPlate focuses on eating plenty of whole grains, fruits, and vegetables, and on limiting fat and sugar. It is available online at www.ChooseMyPlate.gov.  How can you get started?  If you're trying to eat healthier, you can slowly change your eating habits over time. You don't have to make big changes all at  "once. Start by adding one or two healthy foods to your meals each day.  Grains  Choose whole-grain breads and cereals and whole-wheat pasta and whole-grain crackers.  Vegetables  Eat a variety of vegetables every day. They have lots of nutrients and are part of a heart-healthy diet.  Fruits  Eat a variety of fruits every day. Fruits contain lots of nutrients. Choose fresh fruit instead of fruit juice.  Protein foods  Choose fish and lean poultry more often. Eat red meat and fried meats less often. Dried beans, tofu, and nuts are also good sources of protein.  Dairy  Choose low-fat or fat-free products from this food group. If you have problems digesting milk, try eating cheese or yogurt instead.  Fats and oils  Limit fats and oils if you're trying to cut calories. Choose healthy fats when you cook. These include canola oil and olive oil.  Where can you learn more?  Go to https://www.[employer] [place]/patiented  Enter D676 in the search box to learn more about \"Learning About Dietary Guidelines.\"  Current as of: February 28, 2023               Content Version: 13.8    2186-7163 [employer].   Care instructions adapted under license by your healthcare professional. If you have questions about a medical condition or this instruction, always ask your healthcare professional. [employer] disclaims any warranty or liability for your use of this information.      Hearing Loss: Care Instructions  Overview     Hearing loss is a sudden or slow decrease in how well you hear. It can range from slight to profound. Permanent hearing loss can occur with aging. It also can happen when you are exposed long-term to loud noise. Examples include listening to loud music, riding motorcycles, or being around other loud machines.  Hearing loss can affect your work and home life. It can make you feel lonely or depressed. You may feel that you have lost your independence. But hearing aids and other devices can help you " hear better and feel connected to others.  Follow-up care is a key part of your treatment and safety. Be sure to make and go to all appointments, and call your doctor if you are having problems. It's also a good idea to know your test results and keep a list of the medicines you take.  How can you care for yourself at home?  Avoid loud noises whenever possible. This helps keep your hearing from getting worse.  Always wear hearing protection around loud noises.  Wear a hearing aid as directed.  A professional can help you pick a hearing aid that will work best for you.  You can also get hearing aids over the counter for mild to moderate hearing loss.  Have hearing tests as your doctor suggests. They can show whether your hearing has changed. Your hearing aid may need to be adjusted.  Use other devices as needed. These may include:  Telephone amplifiers and hearing aids that can connect to a television, stereo, radio, or microphone.  Devices that use lights or vibrations. These alert you to the doorbell, a ringing telephone, or a baby monitor.  Television closed-captioning. This shows the words at the bottom of the screen. Most new TVs can do this.  TTY (text telephone). This lets you type messages back and forth on the telephone instead of talking or listening. These devices are also called TDD. When messages are typed on the keyboard, they are sent over the phone line to a receiving TTY. The message is shown on a monitor.  Use text messaging, social media, and email if it is hard for you to communicate by telephone.  Try to learn a listening technique called speechreading. It is not lipreading. You pay attention to people's gestures, expressions, posture, and tone of voice. These clues can help you understand what a person is saying. Face the person you are talking to, and have them face you. Make sure the lighting is good. You need to see the other person's face clearly.  Think about counseling if you need help to  "adjust to your hearing loss.  When should you call for help?  Watch closely for changes in your health, and be sure to contact your doctor if:    You think your hearing is getting worse.     You have new symptoms, such as dizziness or nausea.   Where can you learn more?  Go to https://www.[employer].net/patiented  Enter R798 in the search box to learn more about \"Hearing Loss: Care Instructions.\"  Current as of: February 28, 2023               Content Version: 13.8    6252-3194 [employer].   Care instructions adapted under license by your healthcare professional. If you have questions about a medical condition or this instruction, always ask your healthcare professional. [employer] disclaims any warranty or liability for your use of this information.      Bladder Training: Care Instructions  Your Care Instructions     Bladder training is used to treat urge incontinence and stress incontinence. Urge incontinence means that the need to urinate comes on so fast that you can't get to a toilet in time. Stress incontinence means that you leak urine because of pressure on your bladder. For example, it may happen when you laugh, cough, or lift something heavy.  Bladder training can increase how long you can wait before you have to urinate. It can also help your bladder hold more urine. And it can give you better control over the urge to urinate.  It is important to remember that bladder training takes a few weeks to a few months to make a difference. You may not see results right away, but don't give up.  Follow-up care is a key part of your treatment and safety. Be sure to make and go to all appointments, and call your doctor if you are having problems. It's also a good idea to know your test results and keep a list of the medicines you take.  How can you care for yourself at home?  Work with your doctor to come up with a bladder training program that is right for you. You may use one or more of " "the following methods.  Delayed urination  In the beginning, try to keep from urinating for 5 minutes after you first feel the need to go.  While you wait, take deep, slow breaths to relax. Kegel exercises can also help you delay the need to go to the bathroom.  After some practice, when you can easily wait 5 minutes to urinate, try to wait 10 minutes before you urinate.  Slowly increase the waiting period until you are able to control when you have to urinate.  Scheduled urination  Empty your bladder when you first wake up in the morning.  Schedule times throughout the day when you will urinate.  Start by going to the bathroom every hour, even if you don't need to go.  Slowly increase the time between trips to the bathroom.  When you have found a schedule that works well for you, keep doing it.  If you wake up during the night and have to urinate, do it. Apply your schedule to waking hours only.  Kegel exercises  These tighten and strengthen pelvic muscles, which can help you control the flow of urine. (If doing these exercises causes pain, stop doing them and talk with your doctor.) To do Kegel exercises:  Squeeze your muscles as if you were trying not to pass gas. Or squeeze your muscles as if you were stopping the flow of urine. Your belly, legs, and buttocks shouldn't move.  Hold the squeeze for 3 seconds, then relax for 5 to 10 seconds.  Start with 3 seconds, then add 1 second each week until you are able to squeeze for 10 seconds.  Repeat the exercise 10 times a session. Do 3 to 8 sessions a day.  When should you call for help?  Watch closely for changes in your health, and be sure to contact your doctor if:    Your incontinence is getting worse.     You do not get better as expected.   Where can you learn more?  Go to https://www.healthwise.net/patiented  Enter V684 in the search box to learn more about \"Bladder Training: Care Instructions.\"  Current as of: February 28, 2023               Content Version: " 13.8    7461-4254 [employer].   Care instructions adapted under license by your healthcare professional. If you have questions about a medical condition or this instruction, always ask your healthcare professional. [employer] disclaims any warranty or liability for your use of this information.

## 2024-01-17 ENCOUNTER — TELEPHONE (OUTPATIENT)
Dept: FAMILY MEDICINE | Facility: CLINIC | Age: 84
End: 2024-01-17
Payer: COMMERCIAL

## 2024-01-17 NOTE — TELEPHONE ENCOUNTER
Spoke with patient and informed of results below. Patient understood.     Closing encounter.   EDMUNDO Bai,     Overall labs look very good. Your Vitamin D level was a bit high so you can consider cutting your supplement in half. Please let me know if you have any questions/concerns.     Stormy Taylor

## 2024-01-17 NOTE — TELEPHONE ENCOUNTER
----- Message from Stormy Taylor PA-C sent at 1/17/2024  7:00 AM CST -----  Please notify patient/parent of result as [employer] message not read.    Stormy Taylor PA-C

## 2024-02-27 ENCOUNTER — TELEPHONE (OUTPATIENT)
Dept: FAMILY MEDICINE | Facility: CLINIC | Age: 84
End: 2024-02-27
Payer: COMMERCIAL

## 2024-02-27 DIAGNOSIS — M54.40 LOW BACK PAIN WITH SCIATICA, SCIATICA LATERALITY UNSPECIFIED, UNSPECIFIED BACK PAIN LATERALITY, UNSPECIFIED CHRONICITY: ICD-10-CM

## 2024-02-27 DIAGNOSIS — M46.1 SACROILIITIS, NOT ELSEWHERE CLASSIFIED (H): Primary | ICD-10-CM

## 2024-02-27 NOTE — TELEPHONE ENCOUNTER
Order/Referral Request    Who is requesting: patient    Orders being requested: referral for pain management    Reason service is needed/diagnosis: back pain    When are orders needed by: ASAP    Has this been discussed with Provider: No    Does patient have a preference on a Group/Provider/Facility? Yes, [facility]    Does patient have an appointment scheduled?: No    Where to send orders: Place orders within Epic    Could we send this information to you in [facility] or would you prefer to receive a phone call?:   Patient would prefer a phone call   Okay to leave a detailed message?: Yes at Home number on file [phone] (home)

## 2024-02-28 ENCOUNTER — OFFICE VISIT (OUTPATIENT)
Dept: FAMILY MEDICINE | Facility: CLINIC | Age: 84
End: 2024-02-28
Payer: COMMERCIAL

## 2024-02-28 ENCOUNTER — TELEPHONE (OUTPATIENT)
Dept: FAMILY MEDICINE | Facility: CLINIC | Age: 84
End: 2024-02-28

## 2024-02-28 VITALS
HEART RATE: 74 BPM | DIASTOLIC BLOOD PRESSURE: 80 MMHG | TEMPERATURE: 97.4 F | SYSTOLIC BLOOD PRESSURE: 128 MMHG | RESPIRATION RATE: 12 BRPM | OXYGEN SATURATION: 98 % | BODY MASS INDEX: 20.9 KG/M2 | WEIGHT: 109.3 LBS

## 2024-02-28 DIAGNOSIS — S76.912A MUSCLE STRAIN OF LEFT THIGH, INITIAL ENCOUNTER: Primary | ICD-10-CM

## 2024-02-28 PROCEDURE — 99213 OFFICE O/P EST LOW 20 MIN: CPT | Performed by: FAMILY MEDICINE

## 2024-02-28 RX ORDER — RESPIRATORY SYNCYTIAL VIRUS VACCINE 120MCG/0.5
0.5 KIT INTRAMUSCULAR ONCE
Qty: 1 EACH | Refills: 0 | Status: CANCELLED | OUTPATIENT
Start: 2024-02-28 | End: 2024-02-28

## 2024-02-28 ASSESSMENT — ENCOUNTER SYMPTOMS: BACK PAIN: 1

## 2024-02-28 NOTE — PATIENT INSTRUCTIONS
3 Advil (600 mg) taken 3 times daily with food for 3 days.  Stretch twice daily.  Follow up by MyChart if not improved in 1 week.

## 2024-02-28 NOTE — PROGRESS NOTES
Assessment & Plan     Muscle strain of left thigh, initial encounter  Coby is an 84-year-old female, patient of Stormy Taylor who presents to clinic today for left thigh pain.  She has a history of severe thoracal lumbar disc disease with spinal stenosis due to kyphoscoliosis.  She was in her usual state of health about a week ago.  She was sitting in her chair and wondering if she could still do squats.  She got up from her chair and did 10 deep knee squats without any pain.  She felt that this was good for her so the next day she did another set of 10 deep knee squats.  Again she had no immediate pain but woke up the next day with pain that radiates from her left anterior hip down to the anterior left thigh.  This is not similar to the pain that she has with her sciatica from her lumbar spinal stenosis.  She has no numbness that extends below the knee.  The pain is mostly in the left anterior thigh when flexing at the hip such as climbing steps.  She has no pain at night.  She has been taking over-the-counter ibuprofen 2 tablets twice a day which seems to help but the pain continues to come back.    On exam this is a well-appearing elderly female in no acute distress.  Her musculoskeletal exam shows kyphoscoliosis but no tenderness along the lumbar sacral spine.  She has no tenderness over her SI joints bilaterally.  She has no tenderness along the sciatic nerve through the sciatic notch and into the buttocks.  Her pain starts at the insertion of the hip flexor on the left anterior iliac crest and extends down along the hip flexor to the mid thigh.  It involves the hip flexor as well as the vastus lateralis.  There is no knee pain.  No calf pain.    Assessment left thigh strain.  Recommend gentle stretching which we reviewed.  She can increase the over-the-counter nonsteroidal anti-inflammatory.  Her renal function was reviewed and is CKD stage III and stable.  I have suggested 600 mg of ibuprofen taken 3  times daily with food over the next 3 days.  She can use a warm pack or icing to the anterior thigh before and after stretching.  If symptoms or not improving or worsen over the next 1 to 2 weeks then would consider reimaging her low back for new disc impingement.              FUTURE APPOINTMENTS:       - Follow-up for annual visit or as needed        Subjective   Coby is a 84 year old, presenting for the following health issues:  Back Pain        2/28/2024     1:32 PM   Additional Questions   Roomed by Kari severino     Back Pain     History of Present Illness       Back Pain:  She presents for follow up of back pain. Patient's back pain is a chronic problem.  Location of back pain:  Left lower back  Description of back pain: shooting  Back pain spreads: left thigh    Since patient first noticed back pain, pain is: always present, but gets better and worse  Does back pain interfere with her job:  Not applicable       She eats 2-3 servings of fruits and vegetables daily.She consumes 0 sweetened beverage(s) daily.She exercises with enough effort to increase her heart rate 9 or less minutes per day.  She exercises with enough effort to increase her heart rate 3 or less days per week.   She is taking medications regularly.         Patient Active Problem List   Diagnosis    CARDIOVASCULAR SCREENING; LDL GOAL LESS THAN 130    Hypertension goal BP (blood pressure) < 140/90    Age-related osteoporosis without current pathological fracture    Hyperlipidemia LDL goal <130    Cervicalgia    Low back pain, unspecified back pain laterality, unspecified chronicity, with sciatica presence unspecified    Advance Care Planning    Scoliosis of thoracolumbar spine, unspecified scoliosis type    Diarrhea, unspecified type    Sacroiliitis, not elsewhere classified (H24)       Past Surgical History:   Procedure Laterality Date    COLONOSCOPY N/A 12/12/2014    Procedure: COLONOSCOPY;  Surgeon: Bandar Guidry MD;  Location: [facility]     HYSTERECTOMY, PAP NO LONGER INDICATED      INJECT EPIDURAL CERVICAL N/A 9/23/2015    Procedure: INJECT EPIDURAL CERVICAL;  Surgeon: Sawyer Webster MD;  Location: [facility]    INJECT EPIDURAL CERVICAL Right 11/8/2019    Procedure: Cervical 6-7 Spine Injection;  Surgeon: Sawyer Webster MD;  Location: [facility]    INJECT EPIDURAL CERVICAL N/A 10/2/2020    Procedure: INJECTION, SPINE, CERVICAL 6-7, EPIDURAL;  Surgeon: Sawyer eWbster MD;  Location: [facility]    INJECT JOINT SACROILIAC Bilateral 10/24/2019    Procedure: Bilateral Sacroiliac Joint Injections;  Surgeon: Sawyer Webster MD;  Location: [facility]    INJECT JOINT SACROILIAC Bilateral 9/24/2020    Procedure: Bilaterl sacroiliac joint injections;  Surgeon: Sawyer Webster MD;  Location: [facility]    INJECT JOINT SACROILIAC Bilateral 10/15/2021    Procedure: Fluoroscopically-guided injection of the Bilateral sacroiliac joints with Bilateral andre Sacroiliac joint ligaments infiltration over the sacrum;  Surgeon: Sawyer Webster MD;  Location: [facility]    INJECT JOINT SACROILIAC Bilateral 6/16/2023    Procedure: Fluoroscopically-guided injection of the Bilateral sacroiliac joints;  Surgeon: Sawyer Webster MD;  Location: [facility]    PHACOEMULSIFICATION WITH STANDARD INTRAOCULAR LENS IMPLANT Left 11/15/2018    Procedure: PHACOEMULSIFICATION WITH STANDARD INTRAOCULAR LENS IMPLANT LEFT EYE;  Surgeon: Rian Ford MD;  Location: [facility]    PHACOEMULSIFICATION WITH STANDARD INTRAOCULAR LENS IMPLANT Right 11/29/2018    Procedure: PHACOEMULSIFICATION WITH STANDARD INTRAOCULAR LENS IMPLANT RIGHT EYE;  Surgeon: Rian Ford MD;  Location: [facility]     Current Outpatient Medications   Medication Sig Dispense Refill    acetaminophen (TYLENOL) 325 MG tablet Take 325-650 mg by mouth every 6 hours as needed      alendronate (FOSAMAX) 70 MG tablet Take 1 tablet (70 mg) by mouth every 7 days 12 tablet 3    Cholecalciferol (VITAMIN D) 2000 UNITS tablet Take 2,000 Units by mouth daily       IBUPROFEN PO Take 400 mg by mouth every 6 hours as needed for moderate pain      latanoprost (XALATAN) 0.005 % ophthalmic solution       lisinopril (ZESTRIL) 10 MG tablet Take 1 tablet (10 mg) by mouth daily 90 tablet 3    Magnesium Oxide 250 MG TABS Take 1 tablet by mouth daily Patient takes only every other day.      Multiple Vitamins-Minerals (MULTIVITAMIN OR)       Probiotic Product (PROBIOTIC-10 PO)       vitamin B complex with vitamin C (STRESS TAB) tablet Take 1 tablet by mouth daily               Review of Systems  CONSTITUTIONAL: NEGATIVE for fever, chills, change in weight  ENT/MOUTH: NEGATIVE for ear, mouth and throat problems  RESP: NEGATIVE for significant cough or SOB  CV: NEGATIVE for chest pain, palpitations or peripheral edema  MUSCULOSKELETAL: POSITIVE  for muscle spasm left thigh  ROS otherwise negative      Objective    /80   Pulse 74   Temp 97.4  F (36.3  C)   Resp 12   Wt 49.6 kg (109 lb 4.8 oz)   LMP  (LMP Unknown)   SpO2 98%   BMI 20.90 kg/m    Body mass index is 20.9 kg/m .  Physical Exam   GENERAL: alert and no distress  NECK: no adenopathy, no asymmetry, masses, or scars  RESP: lungs clear to auscultation - no rales, rhonchi or wheezes  CV: regular rate and rhythm, normal S1 S2, no S3 or S4, no murmur, click or rub, no peripheral edema  ABDOMEN: soft, nontender, no hepatosplenomegaly, no masses and bowel sounds normal  MS: LLE exam shows  a well-appearing elderly female in no acute distress.  Her musculoskeletal exam shows kyphoscoliosis but no tenderness along the lumbar sacral spine.  She has no tenderness over her SI joints bilaterally.  She has no tenderness along the sciatic nerve through the sciatic notch and into the buttocks.  Her pain starts at the insertion of the hip flexor on the left anterior iliac crest and extends down along the hip flexor to the mid thigh.  It involves the hip flexor as well as the vastus lateralis.  There is no knee pain.  No calf  pain.              Signed Electronically by: Kade Rodriguez MD

## 2024-02-28 NOTE — TELEPHONE ENCOUNTER
Reason for Call:  Appointment Request    Patient requesting this type of appt:  Back pain    Requested provider: Stormy Taylor    Reason patient unable to be scheduled: Not within requested timeframe    When does patient want to be seen/preferred time: Same day    Comments: Back pain    Could we send this information to you in [facility] or would you prefer to receive a phone call?:   Patient would prefer a phone call   Okay to leave a detailed message?: Yes at Home number on file [phone] (home)    Call taken on 2/28/2024 at 8:39 AM by Shelly Slade

## 2024-03-06 ENCOUNTER — OFFICE VISIT (OUTPATIENT)
Dept: FAMILY MEDICINE | Facility: CLINIC | Age: 84
End: 2024-03-06
Payer: COMMERCIAL

## 2024-03-06 VITALS
OXYGEN SATURATION: 99 % | BODY MASS INDEX: 21.44 KG/M2 | TEMPERATURE: 97.5 F | SYSTOLIC BLOOD PRESSURE: 124 MMHG | HEART RATE: 80 BPM | DIASTOLIC BLOOD PRESSURE: 80 MMHG | HEIGHT: 60 IN | WEIGHT: 109.2 LBS | RESPIRATION RATE: 18 BRPM

## 2024-03-06 DIAGNOSIS — S76.912A MUSCLE STRAIN OF LEFT THIGH, INITIAL ENCOUNTER: Primary | ICD-10-CM

## 2024-03-06 DIAGNOSIS — M46.1 SACROILIITIS, NOT ELSEWHERE CLASSIFIED (H): ICD-10-CM

## 2024-03-06 PROCEDURE — 99213 OFFICE O/P EST LOW 20 MIN: CPT | Performed by: FAMILY MEDICINE

## 2024-03-06 RX ORDER — RESPIRATORY SYNCYTIAL VIRUS VACCINE 120MCG/0.5
0.5 KIT INTRAMUSCULAR ONCE
Qty: 1 EACH | Refills: 0 | Status: CANCELLED | OUTPATIENT
Start: 2024-03-06 | End: 2024-03-06

## 2024-03-06 ASSESSMENT — PAIN SCALES - GENERAL: PAINLEVEL: MODERATE PAIN (5)

## 2024-03-06 NOTE — PROGRESS NOTES
Assessment & Plan     Muscle strain of left thigh, initial encounter  Coby is a pleasant 84-year-old female who presents back to clinic today for left thigh pain.  She was seen by me a week ago for symptoms.    She has a history of severe thoracal lumbar disc disease with spinal stenosis due to kyphoscoliosis.  She was in her usual state of health about a week ago.  She was sitting in her chair and wondering if she could still do squats.  She got up from her chair and did 10 deep knee squats without any pain.  She felt that this was good for her so the next day she did another set of 10 deep knee squats.  Again she had no immediate pain but woke up the next day with pain that radiates from her left anterior hip down to the anterior left thigh.  This is not similar to the pain that she has with her sciatica from her lumbar spinal stenosis.  She has no numbness that extends below the knee.  The pain is mostly in the left anterior thigh when flexing at the hip such as climbing steps.  She has no pain at night.  She has been taking over-the-counter ibuprofen 2 tablets twice a day which seems to help but the pain continues to come back.     On exam this is a well-appearing elderly female in no acute distress.  Her musculoskeletal exam shows kyphoscoliosis but no tenderness along the lumbar sacral spine.  She has no tenderness over her SI joints bilaterally.  She has no tenderness along the sciatic nerve through the sciatic notch and into the buttocks.  Her pain starts at the insertion of the hip flexor on the left anterior iliac crest and extends down along the hip flexor to the mid thigh.  It involves the hip flexor as well as the vastus lateralis.  There is no knee pain.  No calf pain.     Assessment left thigh strain.  Recommend gentle stretching which we reviewed.  She can increase the over-the-counter nonsteroidal anti-inflammatory.  Her renal function was reviewed and is CKD stage III and stable.  I have  suggested 600 mg of ibuprofen taken 3 times daily with food over the next 3 days.  She can use a warm pack or icing to the anterior thigh before and after stretching.  If symptoms or not improving or worsen over the next 1 to 2 weeks then would consider reimaging her low back for new disc impingement    She states that over the weekend she did take ibuprofen 600 mg 3 times daily for 3 days and it did seem to help.  She actually felt well enough that she went out and clean the windows on the inside of her [employer] G[employer].  After completing that she noticed worsening left thigh pain again.  Again the pain is not in the distribution of her normal SI symptoms.  On exam today she has tenderness with palpation of the anterior iliac crest and proximal hip flexor.  The pain radiates to the mid thigh.  There is no lateral thigh tenderness over the greater trochanter or trochanteric bursa.  There is no tenderness over the SI joint.    Assessment: Left hip flexor strain and spasm.  Plan continue the anti-inflammatories as previously prescribed.  Will refer her for physical therapy for expeditious evaluation.  If no improvement with physical therapy and activity modification then would consider reimaging of her lumbar spine.  - Physical Therapy  Referral; Future    Sacroiliitis, not elsewhere classified (H24)  Chronic, stable.  - Physical Therapy  Referral; Future              CONSULTATION/REFERRAL to physical therapy    Chloe Tenorio is a 84 year old, presenting for the following health issues:  Pain (Recheck back and left leg pain)      3/6/2024     3:34 PM   Additional Questions   Roomed by Filomena MARC     Pain    History of Present Illness       Back Pain:  She presents for follow up of back pain. Patient's back pain is a chronic problem.  Location of back pain:  Left lower back  Description of back pain: shooting  Back pain spreads: left thigh    Since patient first noticed back pain, pain is: always  present, but gets better and worse  Does back pain interfere with her job:  Not applicable       She eats 2-3 servings of fruits and vegetables daily.She consumes 0 sweetened beverage(s) daily.She exercises with enough effort to increase her heart rate 9 or less minutes per day.  She exercises with enough effort to increase her heart rate 3 or less days per week.   She is taking medications regularly.       Patient Active Problem List   Diagnosis    CARDIOVASCULAR SCREENING; LDL GOAL LESS THAN 130    Hypertension goal BP (blood pressure) < 140/90    Age-related osteoporosis without current pathological fracture    Hyperlipidemia LDL goal <130    Cervicalgia    Low back pain, unspecified back pain laterality, unspecified chronicity, with sciatica presence unspecified    Advance Care Planning    Scoliosis of thoracolumbar spine, unspecified scoliosis type    Diarrhea, unspecified type    Sacroiliitis, not elsewhere classified (H24)     Current Outpatient Medications   Medication Sig Dispense Refill    alendronate (FOSAMAX) 70 MG tablet Take 1 tablet (70 mg) by mouth every 7 days 12 tablet 3    Cholecalciferol (VITAMIN D) 2000 UNITS tablet Take 2,000 Units by mouth daily      IBUPROFEN PO Take 400 mg by mouth every 6 hours as needed for moderate pain      latanoprost (XALATAN) 0.005 % ophthalmic solution       lisinopril (ZESTRIL) 10 MG tablet Take 1 tablet (10 mg) by mouth daily 90 tablet 3    Magnesium Oxide 250 MG TABS Take 1 tablet by mouth daily Patient takes only every other day.      Multiple Vitamins-Minerals (MULTIVITAMIN OR)       vitamin B complex with vitamin C (STRESS TAB) tablet Take 1 tablet by mouth daily      acetaminophen (TYLENOL) 325 MG tablet Take 325-650 mg by mouth every 6 hours as needed (Patient not taking: Reported on 3/6/2024)      Probiotic Product (PROBIOTIC-10 PO)  (Patient not taking: Reported on 3/6/2024)                 Review of Systems  CONSTITUTIONAL: NEGATIVE for fever, chills,  "change in weight  ENT/MOUTH: NEGATIVE for ear, mouth and throat problems  RESP: NEGATIVE for significant cough or SOB  CV: NEGATIVE for chest pain, palpitations or peripheral edema  MUSCULOSKELETAL: NEGATIVE for significant arthralgias or myalgia and POSITIVE  for left thigh pain.  ROS otherwise negative      Objective    /80 (BP Location: Right arm, Patient Position: Chair)   Pulse 80   Temp 97.5  F (36.4  C) (Temporal)   Resp 18   Ht 1.53 m (5' 0.25\")   Wt 49.5 kg (109 lb 3.2 oz)   LMP  (LMP Unknown)   SpO2 99%   BMI 21.15 kg/m    Body mass index is 21.15 kg/m .  Physical Exam   GENERAL: alert and no distress  NECK: no adenopathy, no asymmetry, masses, or scars  RESP: lungs clear to auscultation - no rales, rhonchi or wheezes  CV: regular rate and rhythm, normal S1 S2, no S3 or S4, no murmur, click or rub, no peripheral edema  ABDOMEN: soft, nontender, no hepatosplenomegaly, no masses and bowel sounds normal  MS: LLE exam shows  a well-appearing elderly female in no acute distress.  Her musculoskeletal exam shows kyphoscoliosis but no tenderness along the lumbar sacral spine.  She has no tenderness over her SI joints bilaterally.  She has no tenderness along the sciatic nerve through the sciatic notch and into the buttocks.  Her pain starts at the insertion of the hip flexor on the left anterior iliac crest and extends down along the hip flexor to the mid thigh.  It involves the hip flexor as well as the vastus lateralis.  There is no knee pain.  No calf pain.           Signed Electronically by: Kade Rodriguez MD    "

## 2024-03-12 ENCOUNTER — THERAPY VISIT (OUTPATIENT)
Dept: PHYSICAL THERAPY | Facility: CLINIC | Age: 84
End: 2024-03-12
Attending: FAMILY MEDICINE
Payer: COMMERCIAL

## 2024-03-12 DIAGNOSIS — M46.1 SACROILIITIS, NOT ELSEWHERE CLASSIFIED (H): ICD-10-CM

## 2024-03-12 DIAGNOSIS — S76.912A MUSCLE STRAIN OF LEFT THIGH, INITIAL ENCOUNTER: ICD-10-CM

## 2024-03-12 PROCEDURE — 97530 THERAPEUTIC ACTIVITIES: CPT | Mod: GP | Performed by: PHYSICAL THERAPIST

## 2024-03-12 PROCEDURE — 97110 THERAPEUTIC EXERCISES: CPT | Mod: GP | Performed by: PHYSICAL THERAPIST

## 2024-03-12 PROCEDURE — 97162 PT EVAL MOD COMPLEX 30 MIN: CPT | Mod: GP | Performed by: PHYSICAL THERAPIST

## 2024-03-12 NOTE — PROGRESS NOTES
"PHYSICAL THERAPY EVALUATION  Type of Visit: Evaluation    See electronic medical record for Abuse and Falls Screening details.    Subjective       Presenting condition or subjective complaint: back hip and left leg pain  Date of onset:      Chief complaint:  Patient has chronic back pain but noted worsening of her buttock pain into the L hip area into the left thigh.   Aggravating factors include: Waking in mornings has most pain, standing at baseline is limited on her back, walking is not worse than baseline; no use of assistive device at baseline but used a cane this week. She can do her stairs without pain at this time, uses the rail. Has laundry in basement so does the stairs everyday. Alleviating/Easing factors include: ice, ibuprofen, cane use, recliner with feet.. Sitting is usually better.  Patient takes ibuprofen for this.  She states that her pain is about 10 % better than at onset, and her mornings are really 30+ minutes in AM. Soon as up walking very painful and then ices her back and takes her Ibuprofen.   Pain is 0/10 at best, 9/10 worst, and 2/10 currently L posterior SI region, L lateral hip and anterior thigh to proximal knee. No numbness/tingling, but feels very painful and tender. Goal for PT: Walk without pain    MD H&P:\"Coby is an 84-year-old female, patient of Stormy Taylor who presents to clinic today for left thigh pain. She has a history of severe thoracal lumbar disc disease with spinal stenosis due to kyphoscoliosis. She was in her usual state of health about a week ago. She was sitting in her chair and wondering if she could still do squats. She got up from her chair and did 10 deep knee squats without any pain. She felt that this was good for her so the next day she did another set of 10 deep knee squats. Again she had no immediate pain but woke up the next day with pain that radiates from her left anterior hip down to the anterior left thigh. This is not similar to the pain that she " "has with her sciatica from her lumbar spinal stenosis. She has no numbness that extends below the knee. The pain is mostly in the left anterior thigh when flexing at the hip such as climbing steps. She has no pain at night. She has been taking over-the-counter ibuprofen 2 tablets twice a day which seems to help but the pain continues to come back. \"    Past Medical History:   Diagnosis Date    Breast cancer (H) 2004    lumpectomy and 33 lymph nodes removed.    Shingles        Past Surgical History:   Procedure Laterality Date    COLONOSCOPY N/A 12/12/2014    Procedure: COLONOSCOPY;  Surgeon: Bandar Guidry MD;  Location: [facility]    HYSTERECTOMY, PAP NO LONGER INDICATED      INJECT EPIDURAL CERVICAL N/A 9/23/2015    Procedure: INJECT EPIDURAL CERVICAL;  Surgeon: Sawyer Webster MD;  Location: [facility]    INJECT EPIDURAL CERVICAL Right 11/8/2019    Procedure: Cervical 6-7 Spine Injection;  Surgeon: Sawyer Webster MD;  Location: [facility]    INJECT EPIDURAL CERVICAL N/A 10/2/2020    Procedure: INJECTION, SPINE, CERVICAL 6-7, EPIDURAL;  Surgeon: Sawyer Webster MD;  Location: [facility]    INJECT JOINT SACROILIAC Bilateral 10/24/2019    Procedure: Bilateral Sacroiliac Joint Injections;  Surgeon: Sawyer Webster MD;  Location: [facility]    INJECT JOINT SACROILIAC Bilateral 9/24/2020    Procedure: Bilaterl sacroiliac joint injections;  Surgeon: Sawyer Webster MD;  Location: [facility]    INJECT JOINT SACROILIAC Bilateral 10/15/2021    Procedure: Fluoroscopically-guided injection of the Bilateral sacroiliac joints with Bilateral andre Sacroiliac joint ligaments infiltration over the sacrum;  Surgeon: Sawyer Webster MD;  Location[facility]    INJECT JOINT SACROILIAC Bilateral 6/16/2023    Procedure: Fluoroscopically-guided injection of the Bilateral sacroiliac joints;  Surgeon: Sawyer Webster MD;  [facility]    PHACOEMULSIFICATION WITH STANDARD INTRAOCULAR LENS IMPLANT Left 11/15/2018    Procedure: PHACOEMULSIFICATION WITH STANDARD " INTRAOCULAR LENS IMPLANT LEFT EYE;  Surgeon: Rian Ford MD;  [facility]    PHACOEMULSIFICATION WITH STANDARD INTRAOCULAR LENS IMPLANT Right 11/29/2018    Procedure: PHACOEMULSIFICATION WITH STANDARD INTRAOCULAR LENS IMPLANT RIGHT EYE;  Surgeon: Rian Ford MD;  Location: [facility]         Prior diagnostic imaging/testing results:       IMPRESSION: MRI 2021  1. Moderate to severe rightward convex scoliosis.  2. Multilevel degenerative disc and facet disease most advanced at  L3-L4 where there is moderate central canal stenosis and moderate to  severe bilateral neural foraminal stenoses. There is also moderate to  severe left-sided foraminal stenosis at L2-L3 and moderate to severe  right-sided foraminal stenosis at L4-L5 and L5-S1.  Prior therapy history for the same diagnosis, illness or injury: Jaz has had some PT for her core in the past. She does do some stretches    Previous SI injections which helped some with her back pain but has never had this pain in her leg before        Prior Level of Function  Transfers: Independent  Ambulation: Independent  ADL: Independent  IADL: Yard work    Living Environment  Social support:     Type of home: 1 level; Basement   Stairs to enter the home: No   Is there a railing: No   Ramp:     Stairs inside the home: Yes       Help at home:    Equipment owned: Straight Cane     Employment: No    Hobbies/Interests: Art projects, playing cards, reading    Patient goals for therapy: walk without pain         Objective   LUMBAR SPINE EVALUATION    INTEGUMENTARY (edema, incisions): WNL  POSTURE: R convex scoliosis, pelvic obliquity  GAIT:   Weightbearing Status: WBAT  Assistive Device(s): None  Gait Deviations:  Reduced hip ext L, reduced trunk rotation     BALANCE/PROPRIOCEPTION:  Needs UE support to complete heel raise and toe raises, unable to do SLS without UE support  WEIGHTBEARING ALIGNMENT:   NON-WEIGHTBEARING ALIGNMENT:    ROM:   FF to knees and observed  kyphoscoliosis, no replication of distal symptom, x 10 increases L lateral thigh pain  Extension moderate limited and no increased pain   L SB causes some L buttock pain referring off to the side  R SB is unremarkable but  mildly restricted   PELVIC/SI SCREEN: Sacroiliac Provocation Test: negative, thigh thrust + L - R  STRENGTH: WFL  5/5 DF, PF in sitting, LAQ 5/5, HS 5-/5, Hip flexion 5-/5 R , L causes some increased LBP and pressure with resistance > 3+/5 to 4-/5  Quad set L is mildly tender in vastus lateralis but not with back pain. Her back pain into the leg is replicated by [facility] not [facility]    MYOTOMES: WNL  DTR S: WNL  CORD SIGNS: WNL  DERMATOMES: WNL  NEURAL TENSION: - ASLR  FLEXIBILITY:  HS WFL B and no back pain worsened, Tight hip flexors B but withotu symptoms replicated  LUMBAR/HIP Special Tests: Hip ROM is WNL Flexion, ER/IR abd/adduction without pain reported.     PALPATION: Non tender to light palpation, quad , hip flexor, iliacus, tender at the L piriformis, and L lumbar paraspinals without replicated her pain   SPINAL SEGMENTAL CONCLUSIONS: NT    Assessment & Plan   CLINICAL IMPRESSIONS  Medical Diagnosis: Muscle strain of left thigh, initial encounter (S76.422A)  Sacroiliitis, not elsewhere classified (H24) (M46.1)    Treatment Diagnosis: Muscle strain of left thigh, initial encounter (S76.912A)  Sacroiliitis, not elsewhere classified (H24) (M46.1)   Impression/Assessment: Patient is a 84 year old female with L thigh pain  suspect complaints.  The following significant findings have been identified: Pain, Decreased ROM/flexibility, Decreased joint mobility, Impaired gait, Impaired muscle performance, Decreased activity tolerance, and Impaired posture. These impairments interfere with their ability to perform self care tasks, recreational activities, household chores, and household mobility as compared to previous level of function. Suspect lumbar origin to these symptoms.     Clinical Decision Making  (Complexity):  Clinical Presentation: Stable/Uncomplicated  Clinical Presentation Rationale: based on medical and personal factors listed in PT evaluation  Clinical Decision Making (Complexity): Low complexity    PLAN OF CARE  Treatment Interventions:  Modalities: E-stim  Interventions: Gait Training, Manual Therapy, Neuromuscular Re-education, Therapeutic Activity, Therapeutic Exercise, Self-Care/Home Management    Long Term Goals     PT Goal 1  Goal Identifier: LE function  Goal Description: Patient will report improved LE function via LEFS > 48 to better tolerate return to all previously tolerated daily function  Goal Progress: Eval 38  Target Date: 05/07/24  PT Goal 2  Goal Identifier: Pain  Goal Description: Patient will have resolution of her L leg pain upon rising in mornings  Target Date: 04/23/24  PT Goal 3  Goal Identifier: Exercise  Goal Description: Patient will tolerate all previously enjoyed activity and exercise without undue limiation  Target Date: 05/07/24      Frequency of Treatment: 1/week  Duration of Treatment: 8 weeks    Recommended Referrals to Other Professionals: PRN  Education Assessment:   Learner/Method: Patient  Education Comments: POC    Risks and benefits of evaluation/treatment have been explained.   Patient/Family/caregiver agrees with Plan of Care.     Evaluation Time:     PT Eval, Moderate Complexity Minutes (91672): 30       Signing Clinician: Marjorie Morales, PT      [facility]                                                                                   OUTPATIENT PHYSICAL THERAPY      PLAN OF TREATMENT FOR OUTPATIENT REHABILITATION   Patient's Last Name, First Name, Coby Nair YOB: 1940   Provider's Name   [facility]   Medical Record No.  [id number]     Onset Date:   3/6/2024 date of referral Start of Care Date: 03/12/24     Medical Diagnosis:  Muscle strain of left thigh, initial  encounter (H90.746X)  Sacroiliitis, not elsewhere classified (H24) (M46.1)      PT Treatment Diagnosis:  Muscle strain of left thigh, initial encounter (C93.595A)  Sacroiliitis, not elsewhere classified (H24) (M46.1) Plan of Treatment  Frequency/Duration: 1/week/ 8 weeks    Certification date from 03/12/24 to 05/07/24         See note for plan of treatment details and functional goals     Marjorie Morales, PT                         I CERTIFY THE NEED FOR THESE SERVICES FURNISHED UNDER        THIS PLAN OF TREATMENT AND WHILE UNDER MY CARE     (Physician attestation of this document indicates review and certification of the therapy plan).              Referring Provider:  Kade Rodriguez    Initial Assessment  See Epic Evaluation- Start of Care Date: 03/12/24

## 2024-03-18 ENCOUNTER — NURSE TRIAGE (OUTPATIENT)
Dept: FAMILY MEDICINE | Facility: CLINIC | Age: 84
End: 2024-03-18
Payer: COMMERCIAL

## 2024-03-18 NOTE — TELEPHONE ENCOUNTER
Please have patient follow up with PT as scheduled later this week. After follow up  with PT will consider additional imaging vs referral to Ortho.  Kade Rodriguez MD

## 2024-03-18 NOTE — TELEPHONE ENCOUNTER
Nurse Triage SBAR    Is this a 2nd Level Triage? NO    Situation: Patient calling in and wanting Dr. Rodriguez to know that the pain in her left thigh has not improved at all. She has seen you on 2/28/24 & 3/6/24 for this and it all started around the beginning of February.     Background: She says that she has been going to physical therapy. She doesn't do very much and they want her to ice back & leg, no bending and has her next appointment on Wednesday.    She is doing the icing and activity restrictions and still taking ibuprofen but this used to take the pain away after about an hour and now she says this does nothing for her pain.    Office visit on 3/6/24 with you:  Assessment: Left hip flexor strain and spasm. Plan continue the anti-inflammatories as previously prescribed. Will refer her for physical therapy for expeditious evaluation. If no improvement with physical therapy and activity modification then would consider reimaging of her lumbar spine.     Assessment: Her pain in her left thigh is a constant 7-8/10 and ice & ibuprofen does not dull it at all anymore.     No fevers, no redness, is able to bear weight. No numbness in foot. No swelling.    No chest pain, no shortness of breath.    Protocol Recommended Disposition:   See in Office Within 2 Weeks    Recommendation: Per office note from 3/6/24 if no improvement would consider re-imaging her lumbar spine. Does patient need a follow up appointment with you to consider if she needs imaging?    Routed to provider    Does the patient meet one of the following criteria for ADS visit consideration? No    Perla Damon RN on 3/18/2024 at 1:22 PM    Reason for Disposition   Leg pain or muscle cramp is a chronic symptom (recurrent or ongoing AND lasting > 4 weeks)    Additional Information   Negative: Looks like a broken bone or dislocated joint (e.g., crooked or deformed)   Negative: Sounds like a life-threatening emergency to the triager   Negative:  Followed a hip injury   Negative: Followed a knee injury   Negative: Followed an ankle or foot injury   Negative: Back pain radiating (shooting) into leg(s)   Negative: Foot pain is main symptom   Negative: Ankle pain is main symptom   Negative: Knee pain is main symptom   Negative: Leg swelling is main symptom   Negative: Chest pain   Negative: Difficulty breathing   Negative: Entire foot is cool or blue in comparison to other side   Negative: Unable to walk   Negative: Fever and red area (or area very tender to touch)   Negative: Swollen joint and fever   Negative: Thigh or calf pain in only one leg and present > 1 hour   Negative: Thigh, calf, or ankle swelling in only one leg   Negative: Thigh, calf, or ankle swelling in both legs, but one side is definitely more swollen   Negative: History of prior 'blood clot' in leg or lungs (i.e., deep vein thrombosis, pulmonary embolism)   Negative: History of inherited increased risk of blood clots (e.g., factor 5 Leiden, antithrombin 3, protein C or protein S deficiency, prothrombin mutation)   Negative: Major surgery in past month   Negative: Hip or leg fracture (broken bone) in past month (or had cast on leg or ankle in past month)   Negative: Illness requiring prolonged bedrest in past month (e.g., immobilization, long hospital stay)   Negative: Long-distance travel in past month (e.g., car, bus, train, plane; with trip lasting 6 or more hours)   Negative: Cancer treatment in past six months (or has cancer now)   Negative: Patient sounds very sick or weak to the triager   Negative: SEVERE pain (e.g., excruciating, unable to do any normal activities)   Negative: Cast on leg or ankle and now has increasing pain   Negative: Red area or streak > 2 inches (or 5 cm)   Negative: Painful rash with multiple small blisters grouped together (i.e., dermatomal distribution or 'band' or 'stripe')   Negative: Looks like a boil, infected sore, deep ulcer, or other infected rash  (spreading redness, pus)   Negative: Localized rash is very painful (no fever)   Negative: Numbness in a leg or foot (i.e., loss of sensation)   Negative: Localized pain, redness or hard lump along vein   Negative: Patient wants to be seen   Negative: MODERATE pain (e.g., interferes with normal activities, limping) and present > 3 days   Negative: Swollen joint with no fever or redness   Negative: Leg pain which occurs after walking a certain distance and disappears with rest, AND age > 50   Negative: Leg pain in shins (front of lower legs) and it occurs with running or jumping exercise (e.g., jogging, basketball)   Negative: MILD pain (e.g., does not interfere with normal activities) and present > 7 days    Protocols used: Leg Pain-A-[place]

## 2024-03-18 NOTE — TELEPHONE ENCOUNTER
Called and updated patient on PCP's recommendations. Patient willing to go to PT again on Wednesday and then will call after that is completed.    MITCH AlanisN, RN

## 2024-03-20 ENCOUNTER — THERAPY VISIT (OUTPATIENT)
Dept: PHYSICAL THERAPY | Facility: CLINIC | Age: 84
End: 2024-03-20
Attending: FAMILY MEDICINE
Payer: COMMERCIAL

## 2024-03-20 DIAGNOSIS — M41.9 SCOLIOSIS OF THORACOLUMBAR SPINE, UNSPECIFIED SCOLIOSIS TYPE: Primary | ICD-10-CM

## 2024-03-20 DIAGNOSIS — S76.912A MUSCLE STRAIN OF LEFT THIGH, INITIAL ENCOUNTER: ICD-10-CM

## 2024-03-20 DIAGNOSIS — M46.1 SACROILIITIS, NOT ELSEWHERE CLASSIFIED (H): ICD-10-CM

## 2024-03-20 PROCEDURE — 97140 MANUAL THERAPY 1/> REGIONS: CPT | Mod: GP | Performed by: PHYSICAL THERAPIST

## 2024-03-20 PROCEDURE — 97110 THERAPEUTIC EXERCISES: CPT | Mod: GP | Performed by: PHYSICAL THERAPIST

## 2024-03-21 ENCOUNTER — TELEPHONE (OUTPATIENT)
Dept: FAMILY MEDICINE | Facility: CLINIC | Age: 84
End: 2024-03-21
Payer: COMMERCIAL

## 2024-03-21 DIAGNOSIS — M54.40 LOW BACK PAIN WITH SCIATICA, SCIATICA LATERALITY UNSPECIFIED, UNSPECIFIED BACK PAIN LATERALITY, UNSPECIFIED CHRONICITY: ICD-10-CM

## 2024-03-21 DIAGNOSIS — M46.1 SACROILIITIS, NOT ELSEWHERE CLASSIFIED (H): Primary | ICD-10-CM

## 2024-03-21 NOTE — TELEPHONE ENCOUNTER
Please notify patient we ordered CT lumbar and sacral spine. Please facilitate scheduling exam.  Kade Rodriguez MD

## 2024-03-21 NOTE — TELEPHONE ENCOUNTER
----- Message from Marjorie Morales PT sent at 3/20/2024  4:14 PM CDT -----  Regarding: PT- 2nd visit update  Joy,   I saw Coby again today. Still seems differential to her symptoms are upper lumbar radicular pain vs SI referral mediated pain. I dont think her pain is coming from muscular origin based on the behavior. She has Tender to palpation and shooting pain in L lateral hip in L SL when in palpate along the inferior portion of sacrum.. Hip ROM does not increase or replicate her symptoms in clinic either session for me.  She can do muscular activation thorough her hip and lateral hip , hip rotator without pain increased today. Her pain is not constant. Some better days and other more difficult days.   I do think  low back image (xray if includes sacrum and lumbar) would be helpful to rule out anything fracture related,and she seemed to feel imaging would feel helpful to her.   Do you think that's appropriate?     Marjorie Morales................... PT, DPT, CLT   3/20/2024, 5:38 PM  [phone]

## 2024-03-28 ENCOUNTER — THERAPY VISIT (OUTPATIENT)
Dept: PHYSICAL THERAPY | Facility: CLINIC | Age: 84
End: 2024-03-28
Attending: FAMILY MEDICINE
Payer: COMMERCIAL

## 2024-03-28 DIAGNOSIS — M46.1 SACROILIITIS, NOT ELSEWHERE CLASSIFIED (H): ICD-10-CM

## 2024-03-28 DIAGNOSIS — S76.912A MUSCLE STRAIN OF LEFT THIGH, INITIAL ENCOUNTER: Primary | ICD-10-CM

## 2024-03-28 PROCEDURE — 97110 THERAPEUTIC EXERCISES: CPT | Mod: GP | Performed by: PHYSICAL THERAPIST

## 2024-03-28 PROCEDURE — 97530 THERAPEUTIC ACTIVITIES: CPT | Mod: GP | Performed by: PHYSICAL THERAPIST

## 2024-03-29 ENCOUNTER — HOSPITAL ENCOUNTER (OUTPATIENT)
Dept: CT IMAGING | Facility: CLINIC | Age: 84
Discharge: HOME | End: 2024-03-29
Attending: FAMILY MEDICINE
Payer: COMMERCIAL

## 2024-03-29 DIAGNOSIS — M46.1 SACROILIITIS, NOT ELSEWHERE CLASSIFIED (H): ICD-10-CM

## 2024-03-29 DIAGNOSIS — M54.40 LOW BACK PAIN WITH SCIATICA, SCIATICA LATERALITY UNSPECIFIED, UNSPECIFIED BACK PAIN LATERALITY, UNSPECIFIED CHRONICITY: ICD-10-CM

## 2024-03-29 PROCEDURE — 72131 CT LUMBAR SPINE W/O DYE: CPT

## 2024-03-29 PROCEDURE — 72192 CT PELVIS W/O DYE: CPT

## 2024-04-02 ENCOUNTER — TELEPHONE (OUTPATIENT)
Dept: FAMILY MEDICINE | Facility: CLINIC | Age: 84
End: 2024-04-02
Payer: COMMERCIAL

## 2024-04-02 DIAGNOSIS — J18.1 CONSOLIDATION OF RIGHT LOWER LOBE OF LUNG (H): Primary | ICD-10-CM

## 2024-04-02 NOTE — TELEPHONE ENCOUNTER
Patient is calling about her results.    Dr Rodriguez is out of the office this week, can you address in his absence?    Patient does have appointments with Physical Therapy scheduled.      Jenn ALMENDAREZO/

## 2024-04-03 NOTE — TELEPHONE ENCOUNTER
"Patient returned call. Relayed providers message. Patient expressed understanding and will await a call to schedule CT.    Patient reports the excruciating pain she had that was going down the front of her leg has resolved.She states PT is really helping. She reports she is back to having her \"normal\" back pain.    Peggy Ritchie RN on 4/3/2024 at 10:31 AM    "

## 2024-04-03 NOTE — TELEPHONE ENCOUNTER
Please let patient know that her CT scans continue to show severe degenerative changes of her lumbar spine but these are unchanged compared to 2021. There was an abnormal area seen in her lower right lung and a CT of her chest is recommended to further evaluate this. I have orders placed for this.     Please inquire how patient's pain has been doing.    Stormy Taylor PA-C

## 2024-04-04 ENCOUNTER — TELEPHONE (OUTPATIENT)
Dept: FAMILY MEDICINE | Facility: CLINIC | Age: 84
End: 2024-04-04
Payer: COMMERCIAL

## 2024-04-04 NOTE — TELEPHONE ENCOUNTER
Patient states she is to schedule a CT scan of her lungs.    There is not an order in epic for this?    Please advise if she is to have CT for her lungs?    Sarai Moss RN on 4/4/2024 at 9:34 AM

## 2024-04-04 NOTE — TELEPHONE ENCOUNTER
Order is in place - see imaging tab at bottom this was placed 4/2. Please give patient imaging number to schedule.    Stormy Taylor PA-C

## 2024-04-11 ENCOUNTER — HOSPITAL ENCOUNTER (OUTPATIENT)
Dept: CT IMAGING | Facility: CLINIC | Age: 84
Discharge: HOME | End: 2024-04-11
Attending: PHYSICIAN ASSISTANT | Admitting: PHYSICIAN ASSISTANT
Payer: COMMERCIAL

## 2024-04-11 DIAGNOSIS — J18.1 CONSOLIDATION OF RIGHT LOWER LOBE OF LUNG (H): ICD-10-CM

## 2024-04-11 LAB
CREAT BLD-MCNC: 1 MG/DL (ref 0.5–1)
EGFRCR SERPLBLD CKD-EPI 2021: 56 ML/MIN/1.73M2

## 2024-04-11 PROCEDURE — 250N000011 HC RX IP 250 OP 636: Performed by: PHYSICIAN ASSISTANT

## 2024-04-11 PROCEDURE — 82565 ASSAY OF CREATININE: CPT

## 2024-04-11 PROCEDURE — 71260 CT THORAX DX C+: CPT

## 2024-04-11 PROCEDURE — 250N000009 HC RX 250: Performed by: PHYSICIAN ASSISTANT

## 2024-04-11 RX ORDER — IOPAMIDOL 755 MG/ML
500 INJECTION, SOLUTION INTRAVASCULAR ONCE
Status: COMPLETED | OUTPATIENT
Start: 2024-04-11 | End: 2024-04-11

## 2024-04-11 RX ADMIN — SODIUM CHLORIDE 60 ML: 9 INJECTION, SOLUTION INTRAVENOUS at 15:56

## 2024-04-11 RX ADMIN — IOPAMIDOL 53 ML: 755 INJECTION, SOLUTION INTRAVENOUS at 15:56

## 2024-04-17 ENCOUNTER — TELEPHONE (OUTPATIENT)
Dept: FAMILY MEDICINE | Facility: CLINIC | Age: 84
End: 2024-04-17
Payer: COMMERCIAL

## 2024-04-17 NOTE — TELEPHONE ENCOUNTER
Patient Returning Call    Reason for call:  Patient would like her recent CT results    Information relayed to patient:      Patient has additional questions:        Could we send this information to you in [employer] or would you prefer to receive a phone call?:   Patient would prefer a phone call   Okay to leave a detailed message?: Yes at Home number on file [phone] (home)

## 2024-04-19 ENCOUNTER — THERAPY VISIT (OUTPATIENT)
Dept: PHYSICAL THERAPY | Facility: CLINIC | Age: 84
End: 2024-04-19
Attending: FAMILY MEDICINE
Payer: COMMERCIAL

## 2024-04-19 DIAGNOSIS — M46.1 SACROILIITIS, NOT ELSEWHERE CLASSIFIED (H): ICD-10-CM

## 2024-04-19 DIAGNOSIS — S76.912A MUSCLE STRAIN OF LEFT THIGH, INITIAL ENCOUNTER: Primary | ICD-10-CM

## 2024-04-19 PROCEDURE — 97110 THERAPEUTIC EXERCISES: CPT | Mod: GP | Performed by: PHYSICAL THERAPIST

## 2024-04-19 NOTE — TELEPHONE ENCOUNTER
RN Triage    Patient Contact    Attempt # 1    Was call answered?  No.  Left message on voicemail with information to call me back. When patient calls back please relay message from provider:    Notify her that her lung CT shows likely this is just changes from her scoliosis and not any sign of infection or tumor. However, not knowing her complete medical history, if she is at high risk of cancer based on other factors (family history, tobacco use), then she may be recommended to have a repeat CT scan in 6 months. I recommend she schedule a follow up virtual visit with Stormy in 1-2 months to discuss further, no rush. For now, nothing serious is seen on the CT scan.  MD Perla Desir RN on 4/19/2024 at 4:09 PM

## 2024-04-19 NOTE — TELEPHONE ENCOUNTER
Notify her that her lung CT shows likely this is just changes from her scoliosis and not any sign of infection or tumor. However, not knowing her complete medical history, if she is at high risk of cancer based on other factors (family history, tobacco use), then she may be recommended to have a repeat CT scan in 6 months. I recommend she schedule a follow up virtual visit with Stormy in 1-2 months to discuss further, no rush. For now, nothing serious is seen on the CT scan.  Peggy Huizar MD

## 2024-04-22 NOTE — TELEPHONE ENCOUNTER
Called and notified patient of below message from Dr. Huizar.     Patient states there is no family history of history of lung cancer, but she does have a history of stage 4 breast cancer.     She states she will call back if she would like to set up an appointment to discuss further.     Forwarding to PCP for review/FYI.    MITCH SinghN, RN

## 2024-05-07 NOTE — PROGRESS NOTES
DISCHARGE  Reason for Discharge: Patient has met all goals.    Equipment Issued: HEP    Discharge Plan: Patient to continue home program.    Referring Provider:  Kade Rodriguez       04/19/24 0500   Appointment Info   Signing clinician's name / credentials Marjorie Morales, PT, DPT   Total/Authorized Visits 4/10   Medical Diagnosis Muscle strain of left thigh, initial encounter (S76.605W)  Sacroiliitis, not elsewhere classified (H24) (M46.1)   PT Tx Diagnosis Muscle strain of left thigh, initial encounter (D27.974K)  Sacroiliitis, not elsewhere classified (H24) (M46.1)   Quick Adds Certification   Progress Note/Certification   Start of Care Date 03/12/24   Onset of illness/injury or Date of Surgery 03/06/24   Therapy Frequency 1/week   Predicted Duration 8 weeks   Certification date from 03/12/24   Certification date to 05/07/24   GOALS   PT Goals 2;3   PT Goal 1   Goal Identifier LE function   Goal Description Patient will report improved LE function via LEFS > 48 to better tolerate return to all previously tolerated daily function   Goal Progress Eval 38, current 40. 90% back to her previous baseline reported   Target Date 05/07/24   Date Met 05/07/24   PT Goal 2   Goal Identifier Pain   Goal Description Patient will have resolution of her L leg pain upon rising in mornings   Goal Progress Has been free of AM pain for 7 days in am to date   Target Date 04/23/24   Date Met 04/19/24   Subjective Report   Subjective Report States still doing better, L hip pain in AM has resolved, At home she does her HEP every day and no pain with it. States 90 % back to her baseline prior to her squatting incident. Static standing still having more pain , still feeling limited in standing tolerance. She  has limited her bending some at the spine. No new concerns other than asking for input on chest Ct results   Objective Measure 1   Objective Measure LEFS   Details 40   Therapeutic Procedure/Exercise   Therapeutic Procedures: strength,  endurance, ROM, flexibility minutes (55072) 30   Ther Proc 1 - Details Supine SLR x 10 R, L, 2 sets. Clam shell x 10 reps, RTB x 10, standing partial squats x 15, Standing hip extension x 15 each side UE supported on mat, B heel raises x 15. Continue PJD x 2 MET, and progress reps of all other ex as able/tolerating   Skilled Intervention Ex progression   Patient Response/Progress Denies pain with exercises   Education   Education Comments Printed her chest CT scan results note from Dr Huizar for her to take with her as she missed their call today.   Plan   Home program Hip abd, banded clam, MET x 2, unloading, piriformis stretch, glute sets, into hip extension, heel raises and mini squats   Plan for next session RTC for advancements in ex vs DC if doing well   Total Session Time   Timed Code Treatment Minutes 30   Total Treatment Time (sum of timed and untimed services) 30

## 2024-06-17 PROBLEM — Z71.89 ADVANCED DIRECTIVES, COUNSELING/DISCUSSION: Status: RESOLVED | Noted: 2017-09-22 | Resolved: 2024-06-17

## 2024-07-22 ENCOUNTER — TRANSFERRED RECORDS (OUTPATIENT)
Dept: HEALTH INFORMATION MANAGEMENT | Facility: CLINIC | Age: 84
End: 2024-07-22

## 2024-10-04 ENCOUNTER — PATIENT OUTREACH (OUTPATIENT)
Dept: CARE COORDINATION | Facility: CLINIC | Age: 84
End: 2024-10-04
Payer: COMMERCIAL

## 2024-10-28 NOTE — PROGRESS NOTES
[facility]  [facility] MEDICINE  [address]  [address]  [facility] [address]  Dept: [phone]  Dept Fax: [phone]  Loc: [phone]    Valerie Carmona is a 65 y.o. female who presents today for:  Chief Complaint   Patient presents with    6 Month Follow-Up     HTN, MS     Toe Pain     Right foot        HPI:     HPI    Here for yearly checkup.    Seeing neurology for infusions for MS.      Hypertension under treatment with medications      Arthritis is controlled with OTC prn.     Seasonal allergies are increasing.  On flonase but discussed claritin daily prn.     Left lower leg redness for a month and in the past week it is more warm and hot to the touch and now there is a hard area over the distal 4th metatarsal.  No DVT.  Had surgery on the foot 4/3/24.  Doing well.   Wraps from time to time.      Right wrist slac wrist scheduled for surgery on 12/30/24.  In anticipation of her preop we will obtain an EKG and chest x-ray today.  Labs will need to be done after November 30.    Right foot with 3rd toe pain worse when she takes her shoes off and walks with bare feet at home.      Reviewed chart forpast medical history , surgical history , allergies, social history , family history and medications.    Health Maintenance   Topic Date Due    HIV screen  Never done    Hepatitis C screen  Never done    Shingles vaccine (1 of 2) Never done    Respiratory Syncytial Virus (RSV) Pregnant or age 60 yrs+ (1 - 1-dose 60+ series) Never done    COVID-19 Vaccine (4 - 2023-24 season) 10/28/2029 (Originally 9/1/2024)    Depression Screen  04/30/2025    Colorectal Cancer Screen  06/11/2025    Breast cancer screen  10/24/2025    Lipids  03/21/2029    DTaP/Tdap/Td vaccine (2 - Td or Tdap) 10/22/2031    Flu vaccine  Completed    Hepatitis A vaccine  Aged Out    Hepatitis B vaccine  Aged Out    Hib vaccine  Aged Out    Polio vaccine  Aged Out    Meningococcal (ACWY) vaccine  Aged Out     Prior to immunization administration, verified patients identity using patient s name and date of birth. Please see Immunization Activity for additional information.     Screening Questionnaire for Adult Immunization    Are you sick today?   No   Do you have allergies to medications, food, a vaccine component or latex?   No   Have you ever had a serious reaction after receiving a vaccination?   No   Do you have a long-term health problem with heart, lung, kidney, or metabolic disease (e.g., diabetes), asthma, a blood disorder, no spleen, complement component deficiency, a cochlear implant, or a spinal fluid leak?  Are you on long-term aspirin therapy?   No   Do you have cancer, leukemia, HIV/AIDS, or any other immune system problem?   No   Do you have a parent, brother, or sister with an immune system problem?   No   In the past 3 months, have you taken medications that affect  your immune system, such as prednisone, other steroids, or anticancer drugs; drugs for the treatment of rheumatoid arthritis, Crohn s disease, or psoriasis; or have you had radiation treatments?   No   Have you had a seizure, or a brain or other nervous system problem?   No   During the past year, have you received a transfusion of blood or blood    products, or been given immune (gamma) globulin or antiviral drug?   No   For women: Are you pregnant or is there a chance you could become       pregnant during the next month?   No   Have you received any vaccinations in the past 4 weeks?   No     Immunization questionnaire answers were all negative.        Per orders of Dr. lEoy Will, injection of HD Influenza given by Nicol Will CMA. Patient instructed to remain in clinic for 15 minutes afterwards, and to report any adverse reaction to me immediately.       Screening performed by Nicol Will CMA on 9/21/2020 at 1:32 PM.

## 2024-11-13 ENCOUNTER — HOSPITAL ENCOUNTER (OUTPATIENT)
Dept: MAMMOGRAPHY | Facility: CLINIC | Age: 84
Discharge: HOME | End: 2024-11-13
Attending: PHYSICIAN ASSISTANT | Admitting: PHYSICIAN ASSISTANT
Payer: COMMERCIAL

## 2024-11-13 DIAGNOSIS — Z12.31 VISIT FOR SCREENING MAMMOGRAM: ICD-10-CM

## 2024-11-13 PROCEDURE — 77063 BREAST TOMOSYNTHESIS BI: CPT

## 2024-11-26 ENCOUNTER — OFFICE VISIT (OUTPATIENT)
Dept: FAMILY MEDICINE | Facility: CLINIC | Age: 84
End: 2024-11-26
Payer: COMMERCIAL

## 2024-11-26 VITALS
HEART RATE: 87 BPM | WEIGHT: 112 LBS | BODY MASS INDEX: 21.99 KG/M2 | DIASTOLIC BLOOD PRESSURE: 76 MMHG | TEMPERATURE: 98.2 F | OXYGEN SATURATION: 97 % | HEIGHT: 60 IN | SYSTOLIC BLOOD PRESSURE: 130 MMHG

## 2024-11-26 DIAGNOSIS — Z01.818 PRE-OP EXAM: Primary | ICD-10-CM

## 2024-11-26 DIAGNOSIS — H35.372 MACULAR PUCKERING OF RETINA, LEFT: ICD-10-CM

## 2024-11-26 DIAGNOSIS — I10 HYPERTENSION GOAL BP (BLOOD PRESSURE) < 140/90: ICD-10-CM

## 2024-11-26 PROCEDURE — 99214 OFFICE O/P EST MOD 30 MIN: CPT | Performed by: PHYSICIAN ASSISTANT

## 2024-11-26 ASSESSMENT — PAIN SCALES - GENERAL: PAINLEVEL_OUTOF10: NO PAIN (0)

## 2024-11-26 NOTE — PROGRESS NOTES
Preoperative Evaluation  [address] [address]  Phone: [phone]  Fax: [phone]  Primary Provider: Stormy Taylor PA-C  Pre-op Performing Provider: Stormy Taylor PA-C  Nov 26, 2024 11/26/2024   Surgical Information   What procedure is being done? retina surgery left eye    Facility or Hospital where procedure/surgery will be performed: [facility] [place]    Who is doing the procedure / surgery? Dr. Wiseman    Date of surgery / procedure: 12/10/24    Time of surgery / procedure: unknown    Where do you plan to recover after surgery? at home with family        Patient-reported     Fax number for surgical facility: [phone]      Chloe Tenorio is a 84 year old, presenting for the following:  Pre-Op Exam          11/26/2024     1:47 PM   Additional Questions   Roomed by Stormy MASSEY related to upcoming procedure: retinal puckering - left        11/26/2024   Pre-Op Questionnaire   Have you ever had a heart attack or stroke? No    Have you ever had surgery on your heart or blood vessels, such as a stent placement, a coronary artery bypass, or surgery on an artery in your head, neck, heart, or legs? No    Do you have chest pain with activity? No    Do you have a history of heart failure? No    Do you currently have a cold, bronchitis or symptoms of other infection? No    Do you have a cough, shortness of breath, or wheezing? No    Do you or anyone in your family have previous history of blood clots? No    Do you or does anyone in your family have a serious bleeding problem such as prolonged bleeding following surgeries or cuts? No    Have you ever had problems with anemia or been told to take iron pills? No    Have you had any abnormal blood loss such as black, tarry or bloody stools, or abnormal vaginal bleeding? No    Have you ever had a blood transfusion? No    Are you willing to have a blood transfusion if it is  medically needed before, during, or after your surgery? Yes    Have you or any of your relatives ever had problems with anesthesia? No    Do you have sleep apnea, excessive snoring or daytime drowsiness? No    Do you have any artifical heart valves or other implanted medical devices like a pacemaker, defibrillator, or continuous glucose monitor? No    Do you have artificial joints? No    Are you allergic to latex? No        Patient-reported     Health Care Directive  Patient has a Health Care Directive on file      Preoperative Review of    reviewed - no record of controlled substances prescribed.    Status of Chronic Conditions:  See problem list for active medical problems.  Problems all longstanding and stable, except as noted/documented.  See ROS for pertinent symptoms related to these conditions.    HYPERTENSION - Patient has longstanding history of HTN , currently denies any symptoms referable to elevated blood pressure. Specifically denies chest pain, palpitations, dyspnea, orthopnea, PND or peripheral edema. Blood pressure readings have been in normal range. Current medication regimen is as listed below. Patient denies any side effects of medication.     Patient Active Problem List    Diagnosis Date Noted    Muscle strain of left thigh, initial encounter 03/20/2024     Priority: Medium    Sacroiliitis, not elsewhere classified (H) 05/25/2021     Priority: Medium    Diarrhea, unspecified type 01/08/2021     Priority: Medium    Scoliosis of thoracolumbar spine, unspecified scoliosis type 09/30/2019     Priority: Medium    Low back pain, unspecified back pain laterality, unspecified chronicity, with sciatica presence unspecified 12/14/2016     Priority: Medium    Cervicalgia 08/28/2015     Priority: Medium    Age-related osteoporosis without current pathological fracture 12/10/2014     Priority: Medium    Hyperlipidemia LDL goal <130 12/10/2014     Priority: Medium    CARDIOVASCULAR SCREENING; LDL GOAL  LESS THAN 130 09/18/2013     Priority: Medium    Hypertension goal BP (blood pressure) < 140/90 09/18/2013     Priority: Medium      Past Medical History:   Diagnosis Date    Breast cancer (H) 2004    lumpectomy and 33 lymph nodes removed.    Shingles      Past Surgical History:   Procedure Laterality Date    COLONOSCOPY N/A 12/12/2014    Procedure: COLONOSCOPY;  Surgeon: Bandar Guidry MD;  Location: [facility]    HYSTERECTOMY, PAP NO LONGER INDICATED      INJECT EPIDURAL CERVICAL N/A 9/23/2015    Procedure: INJECT EPIDURAL CERVICAL;  Surgeon: Sawyer Webster MD;  Location: [facility]    INJECT EPIDURAL CERVICAL Right 11/8/2019    Procedure: Cervical 6-7 Spine Injection;  Surgeon: Sawyer Webster MD;  Location: [facility]    INJECT EPIDURAL CERVICAL N/A 10/2/2020    Procedure: INJECTION, SPINE, CERVICAL 6-7, EPIDURAL;  Surgeon: Sawyer Webster MD;  Location: [facility]    INJECT JOINT SACROILIAC Bilateral 10/24/2019    Procedure: Bilateral Sacroiliac Joint Injections;  Surgeon: Sawyer Webster MD;  Location: [facility]    INJECT JOINT SACROILIAC Bilateral 9/24/2020    Procedure: Bilaterl sacroiliac joint injections;  Surgeon: Sawyer Webster MD;  Location: [facility]    INJECT JOINT SACROILIAC Bilateral 10/15/2021    Procedure: Fluoroscopically-guided injection of the Bilateral sacroiliac joints with Bilateral andre Sacroiliac joint ligaments infiltration over the sacrum;  Surgeon: Sawyer Webster MD;  Location: [facility]    INJECT JOINT SACROILIAC Bilateral 6/16/2023    Procedure: Fluoroscopically-guided injection of the Bilateral sacroiliac joints;  Surgeon: Sawyer Webster MD;  [facility]: [facility]    PHACOEMULSIFICATION WITH STANDARD INTRAOCULAR LENS IMPLANT Left 11/15/2018    Procedure: PHACOEMULSIFICATION WITH STANDARD INTRAOCULAR LENS IMPLANT LEFT EYE;  Surgeon: Rian Ford MD;  Location: [facility]    PHACOEMULSIFICATION WITH STANDARD INTRAOCULAR LENS IMPLANT Right 11/29/2018    Procedure: PHACOEMULSIFICATION WITH STANDARD INTRAOCULAR  LENS IMPLANT RIGHT EYE;  Surgeon: Rian Ford MD;  Location:  OR     Current Outpatient Medications   Medication Sig Dispense Refill    acetaminophen (TYLENOL) 325 MG tablet Take 325-650 mg by mouth every 6 hours as needed.      alendronate (FOSAMAX) 70 MG tablet Take 1 tablet (70 mg) by mouth every 7 days 12 tablet 3    Cholecalciferol (VITAMIN D) 2000 UNITS tablet Take 2,000 Units by mouth daily      IBUPROFEN PO Take 400 mg by mouth every 6 hours as needed for moderate pain      latanoprost (XALATAN) 0.005 % ophthalmic solution       lisinopril (ZESTRIL) 10 MG tablet Take 1 tablet (10 mg) by mouth daily 90 tablet 3    Magnesium Oxide 250 MG TABS Take 1 tablet by mouth daily Patient takes only every other day.      Multiple Vitamins-Minerals (MULTIVITAMIN OR)       Probiotic Product (PROBIOTIC-10 PO) Take by mouth.      vitamin B complex with vitamin C (STRESS TAB) tablet Take 1 tablet by mouth daily         No Known Allergies     Social History     Tobacco Use    Smoking status: Never    Smokeless tobacco: Never   Substance Use Topics    Alcohol use: Yes     Comment:  1 glass of wine with dinner      Family History   Problem Relation Age of Onset    Kidney Disease Mother          in 50's    Heart Disease Father          in his 60's    Colitis Father      History   Drug Use No             Review of Systems  Constitutional, HEENT, cardiovascular, pulmonary, GI, , musculoskeletal, neuro, skin, endocrine and psych systems are negative, except as otherwise noted.    Objective    /76   Pulse 87   Temp 98.2  F (36.8  C) (Temporal)   Ht 1.524 m (5')   Wt 50.8 kg (112 lb)   LMP  (LMP Unknown)   SpO2 97%   BMI 21.87 kg/m     Estimated body mass index is 21.87 kg/m  as calculated from the following:    Height as of this encounter: 1.524 m (5').    Weight as of this encounter: 50.8 kg (112 lb).  Physical Exam  GENERAL: alert and no distress  EYES: Eyes grossly normal to inspection, PERRL  and conjunctivae and sclerae normal  HENT: ear canals and TM's normal, nose and mouth without ulcers or lesions  NECK: no adenopathy, no asymmetry, masses, or scars  RESP: lungs clear to auscultation - no rales, rhonchi or wheezes  CV: regular rate and rhythm, normal S1 S2, no S3 or S4, no murmur, click or rub, no peripheral edema  ABDOMEN: soft, nontender, no hepatosplenomegaly, no masses and bowel sounds normal  MS: no gross musculoskeletal defects noted, no edema  SKIN: no suspicious lesions or rashes  NEURO: Normal strength and tone, mentation intact and speech normal  PSYCH: mentation appears normal, affect normal/bright    Recent Labs   Lab Test 04/11/24  1557 01/10/24  1116   HGB  --  13.0   PLT  --  322   NA  --  138   POTASSIUM  --  3.9   CR 1.0 0.78        Diagnostics  No labs were ordered during this visit.   No EKG required, no history of coronary heart disease, significant arrhythmia, peripheral arterial disease or other structural heart disease.    Revised Cardiac Risk Index (RCRI)  The patient has the following serious cardiovascular risks for perioperative complications:   - No serious cardiac risks = 0 points     RCRI Interpretation: 0 points: Class I (very low risk - 0.4% complication rate)     Assessment & Plan     The proposed surgical procedure is considered LOW risk.    Pre-op exam    Macular puckering of retina, left    Hypertension goal BP (blood pressure) < 140/90        - No identified additional risk factors other than previously addressed    Antiplatelet or Anticoagulation Medication Instructions   - Patient is on no antiplatelet or anticoagulation medications.    Additional Medication Instructions  Take all scheduled medications on the day of surgery    Recommendation  Approval given to proceed with proposed procedure, without further diagnostic evaluation.    Signed Electronically by: Stormy Taylor PA-C  A copy of this evaluation report is provided to the requesting physician.

## 2025-01-16 ENCOUNTER — TELEPHONE (OUTPATIENT)
Dept: FAMILY MEDICINE | Facility: CLINIC | Age: 85
End: 2025-01-16
Payer: COMMERCIAL

## 2025-01-16 NOTE — TELEPHONE ENCOUNTER
Reason for Call:  Form, our goal is to have forms completed with 72 hours, however, some forms may require a visit or additional information.    Type of letter, form or note:  handicap    Who is the form from?: Patient    Where did the form come from: Patient or family brought in       What clinic location was the form placed at?: [facility]     Where the form was placed:  In the reg folder for [facility] at the reg desk Box/Folder    What number is listed as a contact on the form?: [phone]        Additional comments:     Call taken on 1/16/2025 at 1:55 PM by Kelsi Rodriguez CNA

## 2025-01-21 NOTE — TELEPHONE ENCOUNTER
I called the patient and let her know that the paperwork is in the mail for the handicap parking form.    Lili

## 2025-02-05 DIAGNOSIS — I10 HYPERTENSION GOAL BP (BLOOD PRESSURE) < 140/90: ICD-10-CM

## 2025-02-05 RX ORDER — LISINOPRIL 10 MG/1
10 TABLET ORAL DAILY
Qty: 90 TABLET | Refills: 0 | Status: SHIPPED | OUTPATIENT
Start: 2025-02-05

## 2025-03-14 ENCOUNTER — NURSE TRIAGE (OUTPATIENT)
Dept: FAMILY MEDICINE | Facility: CLINIC | Age: 85
End: 2025-03-14
Payer: COMMERCIAL

## 2025-03-14 NOTE — TELEPHONE ENCOUNTER
"Nurse Triage SBAR    Is this a 2nd Level Triage? NO    Situation: neck and shoulder pain    Background: n/a    Assessment: pain in right side of neck and right shoulder. Ongoing 3 weeks, went away at one point now back. Getting worse each day. Pain is 8/10 at its worst. Really bothers her when she's driving. Has normal ROM of head and arms. Pain goes into right arm a little bit, \"tingles\" a little.     Protocol Recommended Disposition:   Go To Office Now    Recommendation: tried to find appt at [place] or [place] today. No openings. Advised of UC locations. Patient only wants to go to [facility]. Offered to schedule something for Monday but only [facility] or virtual spots left.       Does the patient meet one of the following criteria for ADS visit consideration? 16+ years old, with an [facility] PCP     TIP  Providers, please consider if this condition is appropriate for management at one of our Acute and Diagnostic Services sites.     If patient is a good candidate, please use dotphrase <dot>triageresponse and select Refer to ADS to document.     Patient stated an understanding and agreed with plan.     LUPE MENDIOLA RN on 3/14/2025 at 3:45 PM   [facility]         Reason for Disposition   SEVERE pain (e.g., excruciating, unable to do any normal activities)    Additional Information   Negative: Shock suspected (e.g., cold/pale/clammy skin, too weak to stand, low BP, rapid pulse)   Negative: Similar pain previously and it was from 'heart attack'   Negative: Similar pain previously from 'angina' and not relieved by nitroglycerin   Negative: Difficult to awaken or acting confused (e.g., disoriented, slurred speech)   Negative: Sounds like a life-threatening emergency to the triager   Negative: Followed an injury to neck (e.g., MVA, sports, impact or collision)   Negative: Chest pain   Negative: Lymph node in the neck is swollen or painful to the touch   Negative: Sore throat is main symptom   Negative: " "Difficulty breathing or unusual sweating (e.g., sweating without exertion)   Negative: Chest pain lasting longer than 5 minutes   Negative: Stiff neck (can't touch chin to chest) and has headache   Negative: Stiff neck (can't touch chin to chest) and fever   Negative: Weakness of an arm or hand   Negative: Problems with bowel or bladder control   Negative: Patient sounds very sick or weak to the triager    Answer Assessment - Initial Assessment Questions  1. ONSET: \"When did the pain start?\"      3 weeks, went away now back, getting worse each day. Constant pain at base of neck.     2. LOCATION: \"Where is the pain located?\"      Shoulder pain, moving into neck. Mainly at base of neck. Can move neck normally. Can touch chin to chest.     3. PAIN: \"How bad is the pain?\" (Scale 1-10; or mild, moderate, severe)      Pain is 8/10, notice it when I drive    4. WORK OR EXERCISE: \"Has there been any recent work or exercise that involved this part of the body?\"      No    5. CAUSE: \"What do you think is causing the shoulder pain?\"      Unsure    6. OTHER SYMPTOMS: \"Do you have any other symptoms?\" (e.g., neck pain, swelling, rash, fever, numbness, weakness)      Can move arm, right shoulder. Base of neck mainly. Pain kind of goes down arm. Kind of tingles. Doing hot pack/cold pack. Feels good but as soon as I do anything, pain is just there. No redness or swelling in the area. Taking ibuprofen    7. PREGNANCY: \"Is there any chance you are pregnant?\" \"When was your last menstrual period?\"      N/a    Protocols used: Neck Pain or [id number], Shoulder Pain-A-OH    "

## 2025-03-15 ENCOUNTER — NURSE TRIAGE (OUTPATIENT)
Dept: NURSING | Facility: CLINIC | Age: 85
End: 2025-03-15
Payer: COMMERCIAL

## 2025-03-15 NOTE — TELEPHONE ENCOUNTER
"I have excruciating pain in my neck and it goes up into my head for the last 3 weeks.. My shoulder is clunking like crazy and is painful. My neck is the worst..  Yesterday the pain was really bad=\"8\", I took Ibuprofen this morning=\"7\" now. Hx of chronic lower back pain. Neck pain is new problem. Base of the middle and neck, right side and right shoulder.   I called the nurse line yesterday. Home care instructions given : range of motion exercises discussed. The exercises didn't help. I use heat and cold--it helps while I am doing it. I use more heat than cold. It is less painful lying down.   Ibuprofen 3 tablets OTC=600mg, at least 3 times per day: during the day. It helps but wears off in 2-2+1/2 hrs. Tylenol Extra Strength 2 tablets in am and again @ hs. Tylenol is not effective.    Triaged to a disposition of See provider within 4 hrs or PCP triage. Home care given per guideline. Recommended to be seen in UC or ER tonight. Due to weather, patient is unwilling to go anywhere tonight. She states she will go tomorrow and is considering going to ER instead of UC due to severity of pain. ([facility]).     Cynthia Dawson RN Triage Nurse Advisor 10:50 AM 3/15/2025Cjulián Dawson RN Triage Nurse Advisor 10:50 AM 3/15/2025     Reason for Disposition   [1] SEVERE neck pain (e.g., excruciating, unable to do any normal activities) AND [2] not improved after 2 hours of pain medicine    Additional Information   Negative: Shock suspected (e.g., cold/pale/clammy skin, too weak to stand, low BP, rapid pulse)   Negative: Difficult to awaken or acting confused (e.g., disoriented, slurred speech)   Negative: [1] Similar pain previously AND [2] it was from \"heart attack\"   Negative: [1] Similar pain previously AND [2] it was from \"angina\" AND [3] not relieved by nitroglycerin   Negative: Sounds like a life-threatening emergency to the triager   Negative: Followed a neck injury (e.g., MVA, sports, impact or collision)   " "Negative: Chest pain   Negative: Lymph node in the neck is swollen or painful to the touch   Negative: Sore throat is main symptom   Negative: Difficulty breathing or unusual sweating (e.g., sweating without exertion)   Negative: [1] Stiff neck (can't put chin to chest) AND [2] headache   Negative: [1] Stiff neck (can't put chin to chest) AND [2] fever   Negative: Weakness of an arm or hand   Negative: Problems with bowel or bladder control     Not new, worsening in the last 6 months. Dr is aware.   Negative: Head is twisting to one side (or ask \"is it turning against your will?\")   Negative: Patient sounds very sick or weak to the triager    Protocols used: Neck Pain or [id number]    "

## 2025-03-16 ENCOUNTER — APPOINTMENT (OUTPATIENT)
Dept: GENERAL RADIOLOGY | Facility: CLINIC | Age: 85
End: 2025-03-16
Attending: EMERGENCY MEDICINE
Payer: COMMERCIAL

## 2025-03-16 ENCOUNTER — HOSPITAL ENCOUNTER (EMERGENCY)
Facility: CLINIC | Age: 85
Discharge: HOME | End: 2025-03-16
Attending: EMERGENCY MEDICINE | Admitting: EMERGENCY MEDICINE
Payer: COMMERCIAL

## 2025-03-16 VITALS
OXYGEN SATURATION: 96 % | TEMPERATURE: 97.9 F | HEART RATE: 70 BPM | SYSTOLIC BLOOD PRESSURE: 157 MMHG | DIASTOLIC BLOOD PRESSURE: 79 MMHG | BODY MASS INDEX: 22.16 KG/M2 | WEIGHT: 112.9 LBS | HEIGHT: 60 IN | RESPIRATION RATE: 18 BRPM

## 2025-03-16 DIAGNOSIS — M25.511 ACUTE PAIN OF RIGHT SHOULDER: ICD-10-CM

## 2025-03-16 DIAGNOSIS — M43.12 ANTEROLISTHESIS OF CERVICAL SPINE: ICD-10-CM

## 2025-03-16 PROCEDURE — 99284 EMERGENCY DEPT VISIT MOD MDM: CPT | Performed by: EMERGENCY MEDICINE

## 2025-03-16 PROCEDURE — 73030 X-RAY EXAM OF SHOULDER: CPT | Mod: RT

## 2025-03-16 PROCEDURE — 72040 X-RAY EXAM NECK SPINE 2-3 VW: CPT

## 2025-03-16 RX ORDER — HYDROCODONE BITARTRATE AND ACETAMINOPHEN 5; 325 MG/1; MG/1
1 TABLET ORAL EVERY 6 HOURS PRN
Qty: 10 TABLET | Refills: 0 | Status: SHIPPED | OUTPATIENT
Start: 2025-03-16 | End: 2025-03-19

## 2025-03-16 RX ORDER — TIZANIDINE 2 MG/1
2 TABLET ORAL 3 TIMES DAILY PRN
Qty: 20 TABLET | Refills: 0 | Status: SHIPPED | OUTPATIENT
Start: 2025-03-16

## 2025-03-16 ASSESSMENT — ACTIVITIES OF DAILY LIVING (ADL)
ADLS_ACUITY_SCORE: 41
ADLS_ACUITY_SCORE: 41

## 2025-03-16 ASSESSMENT — COLUMBIA-SUICIDE SEVERITY RATING SCALE - C-SSRS
6. HAVE YOU EVER DONE ANYTHING, STARTED TO DO ANYTHING, OR PREPARED TO DO ANYTHING TO END YOUR LIFE?: NO
2. HAVE YOU ACTUALLY HAD ANY THOUGHTS OF KILLING YOURSELF IN THE PAST MONTH?: NO
1. IN THE PAST MONTH, HAVE YOU WISHED YOU WERE DEAD OR WISHED YOU COULD GO TO SLEEP AND NOT WAKE UP?: NO

## 2025-03-16 NOTE — DISCHARGE INSTRUCTIONS
X-ray does not show any sign of broken bones.  Your shoulder is normal in alignment.    Referrals have been made to orthopedic surgery regarding your shoulder pain and neurosurgery regarding your neck pain.  They may suggest MRI for better evaluation of your neck or shoulder to determine most effective treatment.  They may also offer injections in the neck or shoulder, physical therapy, or other recommendations to manage her symptoms    You may take the tizanidine up to 3 times daily as needed for muscle pain or spasm    May take 1 tablet of the Norco every 6 hours as needed for severe pain.  Use caution since this medicine can make you drowsy, do not drive or drink alcohol taking this medicine.  Also, Norco has Tylenol in it, so do not take more than 4000 mg of Tylenol from all sources (including over-the-counter Tylenol) in 24 hours    If you develop any new or worsening symptoms do not hesitate to return to the emergency room for evaluation

## 2025-03-16 NOTE — ED TRIAGE NOTES
Triage Assessment (Adult)       Row Name 03/16/25 1014          Triage Assessment    Airway WDL WDL        Respiratory WDL    Respiratory WDL WDL

## 2025-03-16 NOTE — ED PROVIDER NOTES
"  History     Chief Complaint   Patient presents with    Shoulder Pain     HPI  Coby Damon is a 85 year old female who presents with concern of right shoulder pain and neck pain.  Symptoms started 3 weeks ago.  She said she feels like her shoulder is \"clicking and popping\".  Did not have any known injury.  Has not had any problems with the shoulder before.  Pain mostly stays in her right shoulder, but recently she has been having pain in her neck.  Has tried taking Tylenol with no relief.  She has taken some ibuprofen which she thinks helps minimally, but only for a few hours.  Is not having any headache.  Has not been running fever.  No numbness or tingling in her arm.  No chest pain or shortness of breath.  She is right-hand dominant.  Has called the nurse line and followed their instructions but has not gotten any relief from the recommendations.  Does endorse that she has previously had low back pain but the neck and shoulder pain are new    Nursing triage notes 3/14/25  Nurse Triage SBAR     Is this a 2nd Level Triage? NO     Situation: neck and shoulder pain     Background: n/a     Assessment: pain in right side of neck and right shoulder. Ongoing 3 weeks, went away at one point now back. Getting worse each day. Pain is 8/10 at its worst. Really bothers her when she's driving. Has normal ROM of head and arms. Pain goes into right arm a little bit, \"tingles\" a little.      Protocol Recommended Disposition:   Go To Office Now     Recommendation: tried to find appt at [facility] or [place] today. No openings. Advised of  locations. Patient only wants to go to [facility]. Offered to schedule something for Monday but only [facility] or virtual spots left.         Does the patient meet one of the following criteria for ADS visit consideration? 16+ years old, with an [facility] PCP      TIP  Providers, please consider if this condition is appropriate for management at one of our Acute and Diagnostic Services sites.    " "  If patient is a good candidate, please use dotphrase <dot>triageresponse and select Refer to ADS to document.      Patient stated an understanding and agreed with plan.      LUPE MENDIOLA RN on 3/14/2025 at 3:45 PM   [facility]           Reason for Disposition   SEVERE pain (e.g., excruciating, unable to do any normal activities)    Additional Information   Negative: Shock suspected (e.g., cold/pale/clammy skin, too weak to stand, low BP, rapid pulse)   Negative: Similar pain previously and it was from 'heart attack'   Negative: Similar pain previously from 'angina' and not relieved by nitroglycerin   Negative: Difficult to awaken or acting confused (e.g., disoriented, slurred speech)   Negative: Sounds like a life-threatening emergency to the triager   Negative: Followed an injury to neck (e.g., MVA, sports, impact or collision)   Negative: Chest pain   Negative: Lymph node in the neck is swollen or painful to the touch   Negative: Sore throat is main symptom   Negative: Difficulty breathing or unusual sweating (e.g., sweating without exertion)   Negative: Chest pain lasting longer than 5 minutes   Negative: Stiff neck (can't touch chin to chest) and has headache   Negative: Stiff neck (can't touch chin to chest) and fever   Negative: Weakness of an arm or hand   Negative: Problems with bowel or bladder control   Negative: Patient sounds very sick or weak to the triager    Answer Assessment - Initial Assessment Questions  1. ONSET: \"When did the pain start?\"      3 weeks, went away now back, getting worse each day. Constant pain at base of neck.     2. LOCATION: \"Where is the pain located?\"      Shoulder pain, moving into neck. Mainly at base of neck. Can move neck normally. Can touch chin to chest.     3. PAIN: \"How bad is the pain?\" (Scale 1-10; or mild, moderate, severe)      Pain is 8/10, notice it when I drive    4. WORK OR EXERCISE: \"Has there been any recent work or exercise that " "involved this part of the body?\"      No    5. CAUSE: \"What do you think is causing the shoulder pain?\"      Unsure    6. OTHER SYMPTOMS: \"Do you have any other symptoms?\" (e.g., neck pain, swelling, rash, fever, numbness, weakness)      Can move arm, right shoulder. Base of neck mainly. Pain kind of goes down arm. Kind of tingles. Doing hot pack/cold pack. Feels good but as soon as I do anything, pain is just there. No redness or swelling in the area. Taking ibuprofen    7. PREGNANCY: \"Is there any chance you are pregnant?\" \"When was your last menstrual period?\"      N/a    Protocols used: Neck Pain or [id number], Shoulder Pain-A-OH        I have excruciating pain in my neck and it goes up into my head for the last 3 weeks.. My shoulder is clunking like crazy and is painful. My neck is the worst..  Yesterday the pain was really bad=\"8\", I took Ibuprofen this morning=\"7\" now. Hx of chronic lower back pain. Neck pain is new problem. Base of the middle and neck, right side and right shoulder.   I called the nurse line yesterday. Home care instructions given : range of motion exercises discussed. The exercises didn't help. I use heat and cold--it helps while I am doing it. I use more heat than cold. It is less painful lying down.   Ibuprofen 3 tablets OTC=600mg, at least 3 times per day: during the day. It helps but wears off in 2-2+1/2 hrs. Tylenol Extra Strength 2 tablets in am and again @ hs. Tylenol is not effective.     Triaged to a disposition of See provider within 4 hrs or PCP triage. Home care given per guideline. Recommended to be seen in UC or ER tonight. Due to weather, patient is unwilling to go anywhere tonight. She states she will go tomorrow and is considering going to ER instead of UC due to severity of pain. ([facility]).      Cynthia Dawson RN Triage Nurse Advisor 10:50 AM 3/15/2025Cjulián Dawson RN Triage Nurse Advisor 10:50 AM 3/15/2025      Reason for Disposition   [1] SEVERE neck " "pain (e.g., excruciating, unable to do any normal activities) AND [2] not improved after 2 hours of pain medicine    Additional Information   Negative: Shock suspected (e.g., cold/pale/clammy skin, too weak to stand, low BP, rapid pulse)   Negative: Difficult to awaken or acting confused (e.g., disoriented, slurred speech)   Negative: [1] Similar pain previously AND [2] it was from \"heart attack\"   Negative: [1] Similar pain previously AND [2] it was from \"angina\" AND [3] not relieved by nitroglycerin   Negative: Sounds like a life-threatening emergency to the triager   Negative: Followed a neck injury (e.g., MVA, sports, impact or collision)   Negative: Chest pain   Negative: Lymph node in the neck is swollen or painful to the touch   Negative: Sore throat is main symptom   Negative: Difficulty breathing or unusual sweating (e.g., sweating without exertion)   Negative: [1] Stiff neck (can't put chin to chest) AND [2] headache   Negative: [1] Stiff neck (can't put chin to chest) AND [2] fever   Negative: Weakness of an arm or hand   Negative: Problems with bowel or bladder control     Not new, worsening in the last 6 months.  is aware.   Negative: Head is twisting to one side (or ask \"is it turning against your will?\")   Negative: Patient sounds very sick or weak to the triager    Protocols used: Neck Pain or [id number]             Allergies:  No Known Allergies    Problem List:    Patient Active Problem List    Diagnosis Date Noted    Muscle strain of left thigh, initial encounter 03/20/2024     Priority: Medium    Sacroiliitis, not elsewhere classified 05/25/2021     Priority: Medium    Diarrhea, unspecified type 01/08/2021     Priority: Medium    Scoliosis of thoracolumbar spine, unspecified scoliosis type 09/30/2019     Priority: Medium    Low back pain, unspecified back pain laterality, unspecified chronicity, with sciatica presence unspecified 12/14/2016     Priority: Medium    Cervicalgia 08/28/2015     " Priority: Medium    Age-related osteoporosis without current pathological fracture 12/10/2014     Priority: Medium    Hyperlipidemia LDL goal <130 12/10/2014     Priority: Medium    CARDIOVASCULAR SCREENING; LDL GOAL LESS THAN 130 09/18/2013     Priority: Medium    Hypertension goal BP (blood pressure) < 140/90 09/18/2013     Priority: Medium        Past Medical History:    Past Medical History:   Diagnosis Date    Breast cancer (H) 2004    Shingles        Past Surgical History:    Past Surgical History:   Procedure Laterality Date    COLONOSCOPY N/A 12/12/2014    Procedure: COLONOSCOPY;  Surgeon: Bandar Guidry MD;  Location: [facility]    HYSTERECTOMY, PAP NO LONGER INDICATED      INJECT EPIDURAL CERVICAL N/A 9/23/2015    Procedure: INJECT EPIDURAL CERVICAL;  Surgeon: Sawyer Webster MD;  Location: [facility]    INJECT EPIDURAL CERVICAL Right 11/8/2019    Procedure: Cervical 6-7 Spine Injection;  Surgeon: Sawyer Webster MD;  Location: [facility]    INJECT EPIDURAL CERVICAL N/A 10/2/2020    Procedure: INJECTION, SPINE, CERVICAL 6-7, EPIDURAL;  Surgeon: Sawyer Webster MD;  Location: [facility]    INJECT JOINT SACROILIAC Bilateral 10/24/2019    Procedure: Bilateral Sacroiliac Joint Injections;  Surgeon: Sawyer Webster MD;  Location: [facility]    INJECT JOINT SACROILIAC Bilateral 9/24/2020    Procedure: Bilaterl sacroiliac joint injections;  Surgeon: Sawyer Webster MD;  Location: [facility]    INJECT JOINT SACROILIAC Bilateral 10/15/2021    Procedure: Fluoroscopically-guided injection of the Bilateral sacroiliac joints with Bilateral andre Sacroiliac joint ligaments infiltration over the sacrum;  Surgeon: Sawyer Webster MD;  Location: [facility]    INJECT JOINT SACROILIAC Bilateral 6/16/2023    Procedure: Fluoroscopically-guided injection of the Bilateral sacroiliac joints;  Surgeon: Sawyer Webster MD;  [facility]: [facility]    PHACOEMULSIFICATION WITH STANDARD INTRAOCULAR LENS IMPLANT Left 11/15/2018    Procedure: PHACOEMULSIFICATION WITH STANDARD  INTRAOCULAR LENS IMPLANT LEFT EYE;  Surgeon: Rian Ford MD;  [facility]: [facility]    PHACOEMULSIFICATION WITH STANDARD INTRAOCULAR LENS IMPLANT Right 2018    Procedure: PHACOEMULSIFICATION WITH STANDARD INTRAOCULAR LENS IMPLANT RIGHT EYE;  Surgeon: Rian Ford MD;  Location: [facility]       Family History:    Family History   Problem Relation Age of Onset    Kidney Disease Mother          in 50's    Heart Disease Father          in his 60's    Colitis Father        Social History:  Marital Status:   [4]  Social History     Tobacco Use    Smoking status: Former     Types: Cigarettes    Smokeless tobacco: Never   Vaping Use    Vaping status: Never Used   Substance Use Topics    Alcohol use: Yes     Comment:  1 glass of wine with dinner     Drug use: No        Medications:    acetaminophen (TYLENOL) 325 MG tablet  HYDROcodone-acetaminophen (NORCO) 5-325 MG tablet  IBUPROFEN PO  tiZANidine (ZANAFLEX) 2 MG tablet  alendronate (FOSAMAX) 70 MG tablet  Cholecalciferol (VITAMIN D) 2000 UNITS tablet  latanoprost (XALATAN) 0.005 % ophthalmic solution  lisinopril (ZESTRIL) 10 MG tablet  Magnesium Oxide 250 MG TABS  Multiple Vitamins-Minerals (MULTIVITAMIN OR)  Probiotic Product (PROBIOTIC-10 PO)  vitamin B complex with vitamin C (STRESS TAB) tablet          Review of Systems   All other systems reviewed and are negative.      Physical Exam   BP: (!) 178/105  Pulse: 68  Temp: 97.9  F (36.6  C)  Resp: 20  Height: 152.4 cm (5')  Weight: 51.2 kg (112 lb 14.4 oz)  SpO2: 96 %      Physical Exam  Vitals and nursing note reviewed.   Constitutional:       General: She is not in acute distress.     Appearance: She is not toxic-appearing.   HENT:      Head: Normocephalic and atraumatic.   Cardiovascular:      Pulses:           Radial pulses are 2+ on the right side.   Pulmonary:      Effort: Pulmonary effort is normal.   Musculoskeletal:      Right shoulder: No swelling, deformity, bony tenderness or  crepitus. Normal range of motion.      Cervical back: Normal range of motion and neck supple. No rigidity or crepitus. Spinous process tenderness and muscular tenderness present. No pain with movement. Normal range of motion.   Skin:     General: Skin is warm and dry.      Findings: No bruising.   Neurological:      Mental Status: She is alert.         ED Course        Procedures              Critical Care time:  none              Results for orders placed or performed during the hospital encounter of 03/16/25 (from the past 24 hours)   XR Shoulder Right G/E 3 Views    Narrative    EXAM: XR SHOULDER RIGHT G/E 3 VIEWS  LOCATION: [facility]  DATE: 3/16/2025    INDICATION: Shoulder pain and instability  COMPARISON: None.      Impression    IMPRESSION: No acute fracture or malalignment. Mild glenohumeral and moderate acromioclavicular joint degenerative changes. Osteopenia. Multiple remote healed right rib fractures.   Cervical spine XR, 2-3 views    Narrative    EXAM: XR CERVICAL SPINE 2/3 VIEWS  LOCATION: [facility]  DATE: 03/16/2025    INDICATION: Neck pain. No known injury.  COMPARISON: 06/15/2015      Impression    IMPRESSION: 3 mm anterolisthesis of C4 on C5 is new versus the prior radiographs. This appears to be associated with facet arthropathy. No prevertebral soft tissue swelling. Moderate to severe interspace and endplate degeneration C5-C6 appears   progressed. C7 and T1 are obscured by superimposed structures.           Medications - No data to display    Assessments & Plan (with Medical Decision Making)  Coby is an 85-year-old female presenting with concern of right shoulder pain and neck pain that has been ongoing for the last 3 weeks.  See history and physical exam as above  Pleasant 85-year-old female in no acute distress, is hypertensive with blood pressure of 178/105 but otherwise vitally stable and afebrile.  Suspect her hypertension  "is secondary to pain.  She has normal range of motion with her right shoulder, is able to flex and abduct, reach overhead, and this does not cause pain.  She describes it is more of a \"clunking\".  She feels that the pain is deep inside but movement does not make it worse.  Has a strong radial pulse and normal cap refill.  No obvious deformity.  No signs of subluxation with movements.  Additionally, she is complaining of generalized neck pain, there is no specific point tenderness, but she notes pain in the midline and in the musculature.  No known injury.  Will start with x-rays to evaluate.  She did take an ibuprofen about 1 hour before coming to the emergency room.  She does not feel that Tylenol works.  Since she drove herself here, I explained our policy of pain medication and needing a safe ride, otherwise we could forego any pain medicine for now and plan to discharge with some prescriptions if workup is relatively normal.  She would prefer to not try to coordinate a ride and would rather wait for the imaging  X-ray results as above.  No acute findings on shoulder x-ray.  There is a new 3 mm anterolisthesis of C4 on C5 compared with previous radiographs.  She is not demonstrating any neurologic deficits, has normal range of motion, and otherwise no radicular complaints.  At this time, we will plan to discharge with referrals to neurosurgery for cervical spine findings, and Ortho/sports med for shoulder findings.  In the interim, will provide prescriptions for Norco and tizanidine to use for her symptoms.  Instructed on medication dosing and side effects, as well as medication safety and appropriate use.  She can continue to use the ibuprofen if she finds this to be somewhat effective and can alternate this with the other medications provided.  ED return precautions discussed.  Discharged in stable condition       I have reviewed the nursing notes.    I have reviewed the findings, diagnosis, plan and need for " follow up with the patient.           Medical Decision Making  The patient's presentation was of low complexity (an acute and uncomplicated illness or injury).    The patient's evaluation involved:  ordering and/or review of 2 test(s) in this encounter (see separate area of note for details)    The patient's management necessitated moderate risk (prescription drug management including medications given in the ED).        Discharge Medication List as of 3/16/2025 12:33 PM        START taking these medications    Details   HYDROcodone-acetaminophen (NORCO) 5-325 MG tablet Take 1 tablet by mouth every 6 hours as needed for severe pain., Disp-10 tablet, R-0, E-Prescribe      tiZANidine (ZANAFLEX) 2 MG tablet Take 1 tablet (2 mg) by mouth 3 times daily as needed for muscle spasms., Disp-20 tablet, R-0, E-Prescribe             Final diagnoses:   Acute pain of right shoulder   Anterolisthesis of cervical spine       3/16/2025   [facility] EMERGENCY DEPT       Shana Kim DO  03/16/25 [id number]

## 2025-03-16 NOTE — ED TRIAGE NOTES
Pt reports right shoulder pain for the past 3 weeks without injury. States pain has gotten worse and is now in her right upper back area.      Triage Assessment (Adult)       Row Name 03/16/25 1014          Triage Assessment    Airway WDL WDL        Respiratory WDL    Respiratory WDL WDL

## 2025-03-17 DIAGNOSIS — M25.511 RIGHT SHOULDER PAIN, UNSPECIFIED CHRONICITY: Primary | ICD-10-CM

## 2025-03-17 NOTE — TELEPHONE ENCOUNTER
Called patient to get an update. She states she came home and took one of the pain pills they gave her, and she got sick with nausea and vomiting, so she says she won't take any more of those.     She states she took 1 muscle relaxer last night, and 1 this morning and these are helping a lot with no side effects. States she feels pretty good this morning.     She mentioned they talked about maybe needing an MRI, and wonders if this should be done before seeing neuro or ortho? Writer advised they likely meant that neuro and ortho will further evaluate to see if an MRI is needed. She had no other questions.     Janine Andres, MITCHN, RN

## 2025-03-17 NOTE — TELEPHONE ENCOUNTER
Patient was seen in the ER over the weekend and given referrals for ortho and spine care. Please inquire how she is doing and if there is anything we can help with at this time.     Stormy Taylor PA-C

## 2025-03-19 ENCOUNTER — OFFICE VISIT (OUTPATIENT)
Dept: ORTHOPEDICS | Facility: CLINIC | Age: 85
End: 2025-03-19
Payer: COMMERCIAL

## 2025-03-19 ENCOUNTER — ANCILLARY PROCEDURE (OUTPATIENT)
Dept: GENERAL RADIOLOGY | Facility: CLINIC | Age: 85
End: 2025-03-19
Attending: NURSE PRACTITIONER
Payer: COMMERCIAL

## 2025-03-19 VITALS — HEIGHT: 61 IN | WEIGHT: 112.6 LBS | BODY MASS INDEX: 21.26 KG/M2

## 2025-03-19 DIAGNOSIS — M25.511 ACUTE PAIN OF RIGHT SHOULDER: ICD-10-CM

## 2025-03-19 DIAGNOSIS — M25.511 RIGHT SHOULDER PAIN, UNSPECIFIED CHRONICITY: ICD-10-CM

## 2025-03-19 PROCEDURE — 99203 OFFICE O/P NEW LOW 30 MIN: CPT | Performed by: ORTHOPAEDIC SURGERY

## 2025-03-19 PROCEDURE — 1125F AMNT PAIN NOTED PAIN PRSNT: CPT | Performed by: ORTHOPAEDIC SURGERY

## 2025-03-19 PROCEDURE — 73020 X-RAY EXAM OF SHOULDER: CPT | Mod: TC | Performed by: RADIOLOGY

## 2025-03-19 ASSESSMENT — PAIN SCALES - GENERAL: PAINLEVEL_OUTOF10: SEVERE PAIN (7)

## 2025-03-19 NOTE — LETTER
"3/19/2025      Coby Damon  [address] [facility] [address]      Dear Colleague,    Thank you for referring your patient, Coby Damon, to the [facility]. Please see a copy of my visit note below.    ORTHOPEDIC CONSULT      Chief Complaint: Coby Damon is a 85 year old female who is being seen for   Chief Complaint   Patient presents with     Right Shoulder - Pain     X 1 month, no injury. Constant aching and clunking with movement       History of Present Illness:   Today's visit:  Presents with right posterior shoulder trapezial and neck pain.  This been going on a month.  Describes some \"clunking\" with her shoulder motion that is not painful.  Nonradiating down the arm.  No peripheral numbness and tingling.  Recently seen in the ER.  Started on zanaflexwhich has been helpful.  Set to see neurosurgery this Friday.  Denies any weakness or gait issues.    April 5, 2021 visit:  Returns today for corticosteroid injection.  At her last visit she had her Covid vaccine in proximity so was delayed.  No changes.     March 17, 2021 visit:  Coby Damon is a 81 year old female who [facility].     Pre-existing for a right shoulder issue back in 2015.  Today presents for a contralateral shoulder.  Complains of posterior lateral shoulder pain.  Describes it as achy and feels like he is guarding it.  Worse with reaching.  She feels like it began last November when she slipped and fell jarring her arm.  Nonradiating.  She feels like it flares a few times a week.  The last half a day impacting activity and life.  She is been taken some Tylenol and ibuprofen.     July 23, 2015 office visit:  Returns for evaluation and to discuss MRI results. Previous visit was reviewed. It is unchanged. Complains of significant weakness with overhead lifting type activities. Complains of mild to moderate pain at " times. Describes it as achy. Nonradiating. She will be seen [facility] later today.  July 15, 2015 visit:  Presents the complaint of right shoulder and arm pain. She has been seen by [facility] for cervical spine pain. She had neck pain as long as 4 years ago. She received a injection at that point which resolved her symptoms. Pain return. She underwent a chiropractic treatment that helped her symptoms significantly. However started having more pain into the shoulder region. Pain is located anterolaterally. She also complains of weakness with overhead and reaching. She has taken Aleve and ibuprofen withimprovement. She feels the symptoms began approximately 2 weeks after falling ground level onto her shoulders and arms. Major complaint is the weakness she feels         Patient's past medical, surgical, social and family histories reviewed.     Past Medical History:   Diagnosis Date     Breast cancer (H) 2004    lumpectomy and 33 lymph nodes removed.     Shingles        Past Surgical History:   Procedure Laterality Date     COLONOSCOPY N/A 12/12/2014    Procedure: COLONOSCOPY;  Surgeon: Bandar uGidry MD;  [facility]: [facility]     HYSTERECTOMY, PAP NO LONGER INDICATED       INJECT EPIDURAL CERVICAL N/A 9/23/2015    Procedure: INJECT EPIDURAL CERVICAL;  Surgeon: Sawyer Webster MD;  [facility]     INJECT EPIDURAL CERVICAL Right 11/8/2019    Procedure: Cervical 6-7 Spine Injection;  Surgeon: Sawyer Webster MD;  [facility]     INJECT EPIDURAL CERVICAL N/A 10/2/2020    Procedure: INJECTION, SPINE, CERVICAL 6-7, EPIDURAL;  Surgeon: Sawyer Webster MD;  [facility]     INJECT JOINT SACROILIAC Bilateral 10/24/2019    Procedure: Bilateral Sacroiliac Joint Injections;  Surgeon: Sawyer Webster MD;  [facility]     INJECT JOINT SACROILIAC Bilateral 9/24/2020    Procedure: Bilaterl sacroiliac joint injections;  Surgeon: Sawyer Webster MD;  [facility]     INJECT JOINT SACROILIAC Bilateral 10/15/2021     Procedure: Fluoroscopically-guided injection of the Bilateral sacroiliac joints with Bilateral andre Sacroiliac joint ligaments infiltration over the sacrum;  Surgeon: Sawyer Webster MD;  [facility]     INJECT JOINT SACROILIAC Bilateral 6/16/2023    Procedure: Fluoroscopically-guided injection of the Bilateral sacroiliac joints;  Surgeon: Sawyer Webster MD;  [facility]     PHACOEMULSIFICATION WITH STANDARD INTRAOCULAR LENS IMPLANT Left 11/15/2018    Procedure: PHACOEMULSIFICATION WITH STANDARD INTRAOCULAR LENS IMPLANT LEFT EYE;  Surgeon: Rian Ford MD;  [facility]     PHACOEMULSIFICATION WITH STANDARD INTRAOCULAR LENS IMPLANT Right 11/29/2018    Procedure: PHACOEMULSIFICATION WITH STANDARD INTRAOCULAR LENS IMPLANT RIGHT EYE;  Surgeon: Rian Ford MD;  [facility]       Medications:  Current Outpatient Medications   Medication Sig Dispense Refill     alendronate (FOSAMAX) 70 MG tablet Take 1 tablet by mouth once a week 12 tablet 0     lisinopril (ZESTRIL) 10 MG tablet Take 1 tablet by mouth once daily 90 tablet 0     acetaminophen (TYLENOL) 325 MG tablet Take 325-650 mg by mouth every 6 hours as needed.       Cholecalciferol (VITAMIN D) 2000 UNITS tablet Take 2,000 Units by mouth daily       IBUPROFEN PO Take 400 mg by mouth every 6 hours as needed for moderate pain       latanoprost (XALATAN) 0.005 % ophthalmic solution        Magnesium Oxide 250 MG TABS Take 1 tablet by mouth daily Patient takes only every other day.       Multiple Vitamins-Minerals (MULTIVITAMIN OR)        Probiotic Product (PROBIOTIC-10 PO) Take by mouth.       tiZANidine (ZANAFLEX) 2 MG tablet Take 1 tablet (2 mg) by mouth 3 times daily as needed for muscle spasms. 20 tablet 0     vitamin B complex with vitamin C (STRESS TAB) tablet Take 1 tablet by mouth daily       No current facility-administered medications for this visit.       No Known Allergies    Social History     Occupational History      "Not on file   Tobacco Use     Smoking status: Former     Types: Cigarettes     Smokeless tobacco: Never   Vaping Use     Vaping status: Never Used   Substance and Sexual Activity     Alcohol use: Yes     Comment:  1 glass of wine with dinner      Drug use: No     Sexual activity: Never       Family History   Problem Relation Age of Onset     Kidney Disease Mother          in 50's     Heart Disease Father          in his 60's     Colitis Father        REVIEW OF SYSTEMS  10 point review systems performed otherwise negative as noted as per history of present illness.    Physical Exam:  Vitals: Ht 1.542 m (5' 0.71\")   Wt 51.1 kg (112 lb 9.6 oz)   LMP  (LMP Unknown)   BMI 21.48 kg/m    BMI= Body mass index is 21.48 kg/m .  Constitutional: healthy, alert and no acute distress   Psychiatric: mentation appears normal and affect normal/bright  NEURO: no focal deficits  RESP: Normal with easy respirations and no use of accessory muscles to breathe, no audible wheezing or retractions  CV: RUE:  no edema         Regular rate and rhythm by palpation  SKIN: No erythema, rashes, excoriation, or breakdown. No evidence of infection.   JOINT/EXTREMITIES:right shoulder full active range of motion.  No weakness.  No gross deformity.  Some tenderness throughout the trap area.     GAIT: not tested     Diagnostic Modalities:  right shoulder X-ray: Well preserved glenohumeral articular space. No fracture, dislocation and or lesion. , type II acromion  Independent visualization of the images was performed.  MR SHOULDER RIGHT WITHOUT CONTRAST 2015 2:20 PM     HISTORY: Right shoulder pain.     TECHNIQUE: Axial dual echo T2. Coronal T1. Coronal T2 with and without  fat suppression. Sagittal T2.    COMPARISON: X-rays from 7/15/2015.    FINDINGS:  Osseous Acromion Outlet: There is a mildly curved type 2 acromion.   No anterior downsloping. No lateral acromial downsloping. Small  subacromial spur. Mild AC degenerative changes do " not appear to  significantly narrow the supraspinatous outlet.    Rotator Cuff: There is a small 0.8 cm diameter full-thickness tear of  the mid to distal supraspinatus tendon. Remainder of the rotator cuff  is intact.    Labral Structures: Blunted abnormal appearance of the posterior aspect  of the superior labrum consistent with labral degeneration or labral  tearing. There is a sublabral foramen versus labral tear superior  quadrant of the anterior labrum.    Biceps Tendon: No tear, dislocation, or significant tendinosis.    Osseous and Cartilaginous Structures: Mild to moderate glenohumeral  degenerative changes. Bone marrow signal is unremarkable except for  mild cystic resorptive changes greater tuberosity region.    Additional Findings: No joint effusion or periarticular cystic  lesion. No focal muscle atrophy.    IMPRESSION:  1. Mildly curved type II acromion. Small subacromial spur.  2. There is a small 0.8 cm full-thickness diameter tear of the mid to  distal supraspinatus tendon.  3. Blunted abnormal appearance of the posterior aspect of the superior  labrum consistent with labral degeneration or labral tearing.  4. Sublabral foramen versus labral tear superior quadrant of the  anterior labrum.  5. Mild to moderate glenohumeral degenerative changes.        Impression: right trapezial muscle pain/neck pain    Plan:  All of the above pertinent physical exam and imaging modalities findings was reviewed with Coby.    We discussed her findings.  Nothing from the joint perspective that I am concerned about.  More surrounding musculature.  She was started on zanaflex, which is helpful.  Set to see neurosurgery regarding an abnormal neck x-ray from her ER visit.  I put in a referral for therapy.        Return to clinic 6, week(s), PRN, or sooner as needed for changes.  Re-x-ray on return: No    Peewee Rogers D.O.      Again, thank you for allowing me to participate in the care of your patient.         Sincerely,        Zain Rogers, DO    Electronically signed

## 2025-03-19 NOTE — CONFIDENTIAL NOTE
NEUROSURGERY - NEW PREVISIT PLANNING    Referring Provider: Shana Kim DO    [facility] ED 3/16/25   Reason For Visit: M43.12 (ICD-10-CM) - Anterolisthesis of cervical spine        IMAGING STATUS/LOCATION DATE/TYPE   MRI PACS 07/07/2015  Cervical  [facility]   CT N/A    XRAY PACS 03/16/2025  Cervical  [facility]   NOTES STATUS/LOCATION DATE/TYPE   Other specialist [facility]: N/A    EMG N/A    INJECTION Encounters    (Has also had other inj for SIJ) 10/02/2020, C6-7 [facility]    09/23/2015, C7-T1 [facility]   PHYSICAL THERAPY Encounters 2015   SURGERY N/A

## 2025-03-19 NOTE — PROGRESS NOTES
"ORTHOPEDIC CONSULT      Chief Complaint: Coby Damon is a 85 year old female who is being seen for   Chief Complaint   Patient presents with    Right Shoulder - Pain     X 1 month, no injury. Constant aching and clunking with movement       History of Present Illness:   Today's visit:  Presents with right posterior shoulder trapezial and neck pain.  This been going on a month.  Describes some \"clunking\" with her shoulder motion that is not painful.  Nonradiating down the arm.  No peripheral numbness and tingling.  Recently seen in the ER.  Started on zanaflexwhich has been helpful.  Set to see neurosurgery this Friday.  Denies any weakness or gait issues.    April 5, 2021 visit:  Returns today for corticosteroid injection.  At her last visit she had her Covid vaccine in proximity so was delayed.  No changes.     March 17, 2021 visit:  Coby Damon is a 81 year old female who is seen in consultation at the request of [facility] for evaluation of left shoulder pain.     Pre-existing for a right shoulder issue back in 2015.  Today presents for a contralateral shoulder.  Complains of posterior lateral shoulder pain.  Describes it as achy and feels like he is guarding it.  Worse with reaching.  She feels like it began last November when she slipped and fell jarring her arm.  Nonradiating.  She feels like it flares a few times a week.  The last half a day impacting activity and life.  She is been taken some Tylenol and ibuprofen.     July 23, 2015 office visit:  Returns for evaluation and to discuss MRI results. Previous visit was reviewed. It is unchanged. Complains of significant weakness with overhead lifting type activities. Complains of mild to moderate pain at times. Describes it as achy. Nonradiating. She will be seen [facility] later today.  July 15, 2015 visit:  Presents the complaint of right shoulder and arm pain. She has been seen by [facility] for cervical spine pain. She had neck pain as " long as 4 years ago. She received a injection at that point which resolved her symptoms. Pain return. She underwent a chiropractic treatment that helped her symptoms significantly. However started having more pain into the shoulder region. Pain is located anterolaterally. She also complains of weakness with overhead and reaching. She has taken Aleve and ibuprofen withimprovement. She feels the symptoms began approximately 2 weeks after falling ground level onto her shoulders and arms. Major complaint is the weakness she feels         Patient's past medical, surgical, social and family histories reviewed.     Past Medical History:   Diagnosis Date    Breast cancer (H) 2004    lumpectomy and 33 lymph nodes removed.    Shingles        Past Surgical History:   Procedure Laterality Date    COLONOSCOPY N/A 12/12/2014    Procedure: COLONOSCOPY;  Surgeon: Bandar Guidry MD;  [facility]    HYSTERECTOMY, PAP NO LONGER INDICATED      INJECT EPIDURAL CERVICAL N/A 9/23/2015    Procedure: INJECT EPIDURAL CERVICAL;  Surgeon: Sawyer Webster MD;  [facility]    INJECT EPIDURAL CERVICAL Right 11/8/2019    Procedure: Cervical 6-7 Spine Injection;  Surgeon: Sawyer Webster MD;  [facility]    INJECT EPIDURAL CERVICAL N/A 10/2/2020    Procedure: INJECTION, SPINE, CERVICAL 6-7, EPIDURAL;  Surgeon: Sawyer Webster MD;  [facility]    INJECT JOINT SACROILIAC Bilateral 10/24/2019    Procedure: Bilateral Sacroiliac Joint Injections;  Surgeon: Sawyer Webster MD;  [facility]    INJECT JOINT SACROILIAC Bilateral 9/24/2020    Procedure: Bilaterl sacroiliac joint injections;  Surgeon: Sawyer Webster MD;  [facility]    INJECT JOINT SACROILIAC Bilateral 10/15/2021    Procedure: Fluoroscopically-guided injection of the Bilateral sacroiliac joints with Bilateral andre Sacroiliac joint ligaments infiltration over the sacrum;  Surgeon: Sawyer Webster MD;  [facility]    INJECT JOINT SACROILIAC Bilateral 6/16/2023     Procedure: Fluoroscopically-guided injection of the Bilateral sacroiliac joints;  Surgeon: Sawyer Webster MD;  [facility]    PHACOEMULSIFICATION WITH STANDARD INTRAOCULAR LENS IMPLANT Left 11/15/2018    Procedure: PHACOEMULSIFICATION WITH STANDARD INTRAOCULAR LENS IMPLANT LEFT EYE;  Surgeon: Rian Ford MD;  [facility]    PHACOEMULSIFICATION WITH STANDARD INTRAOCULAR LENS IMPLANT Right 11/29/2018    Procedure: PHACOEMULSIFICATION WITH STANDARD INTRAOCULAR LENS IMPLANT RIGHT EYE;  Surgeon: Rian Ford MD;  [facility]       Medications:  Current Outpatient Medications   Medication Sig Dispense Refill    alendronate (FOSAMAX) 70 MG tablet Take 1 tablet by mouth once a week 12 tablet 0    lisinopril (ZESTRIL) 10 MG tablet Take 1 tablet by mouth once daily 90 tablet 0    acetaminophen (TYLENOL) 325 MG tablet Take 325-650 mg by mouth every 6 hours as needed.      Cholecalciferol (VITAMIN D) 2000 UNITS tablet Take 2,000 Units by mouth daily      IBUPROFEN PO Take 400 mg by mouth every 6 hours as needed for moderate pain      latanoprost (XALATAN) 0.005 % ophthalmic solution       Magnesium Oxide 250 MG TABS Take 1 tablet by mouth daily Patient takes only every other day.      Multiple Vitamins-Minerals (MULTIVITAMIN OR)       Probiotic Product (PROBIOTIC-10 PO) Take by mouth.      tiZANidine (ZANAFLEX) 2 MG tablet Take 1 tablet (2 mg) by mouth 3 times daily as needed for muscle spasms. 20 tablet 0    vitamin B complex with vitamin C (STRESS TAB) tablet Take 1 tablet by mouth daily       No current facility-administered medications for this visit.       No Known Allergies    Social History     Occupational History    Not on file   Tobacco Use    Smoking status: Former     Types: Cigarettes    Smokeless tobacco: Never   Vaping Use    Vaping status: Never Used   Substance and Sexual Activity    Alcohol use: Yes     Comment:  1 glass of wine with dinner     Drug use: No    Sexual  "activity: Never       Family History   Problem Relation Age of Onset    Kidney Disease Mother          in 50's    Heart Disease Father          in his 60's    Colitis Father        REVIEW OF SYSTEMS  10 point review systems performed otherwise negative as noted as per history of present illness.    Physical Exam:  Vitals: Ht 1.542 m (5' 0.71\")   Wt 51.1 kg (112 lb 9.6 oz)   LMP  (LMP Unknown)   BMI 21.48 kg/m    BMI= Body mass index is 21.48 kg/m .  Constitutional: healthy, alert and no acute distress   Psychiatric: mentation appears normal and affect normal/bright  NEURO: no focal deficits  RESP: Normal with easy respirations and no use of accessory muscles to breathe, no audible wheezing or retractions  CV: RUE:  no edema         Regular rate and rhythm by palpation  SKIN: No erythema, rashes, excoriation, or breakdown. No evidence of infection.   JOINT/EXTREMITIES:right shoulder full active range of motion.  No weakness.  No gross deformity.  Some tenderness throughout the trap area.     GAIT: not tested     Diagnostic Modalities:  right shoulder X-ray: Well preserved glenohumeral articular space. No fracture, dislocation and or lesion. , type II acromion  Independent visualization of the images was performed.  MR SHOULDER RIGHT WITHOUT CONTRAST 2015 2:20 PM     HISTORY: Right shoulder pain.     TECHNIQUE: Axial dual echo T2. Coronal T1. Coronal T2 with and without  fat suppression. Sagittal T2.    COMPARISON: X-rays from 7/15/2015.    FINDINGS:  Osseous Acromion Outlet: There is a mildly curved type 2 acromion.   No anterior downsloping. No lateral acromial downsloping. Small  subacromial spur. Mild AC degenerative changes do not appear to  significantly narrow the supraspinatous outlet.    Rotator Cuff: There is a small 0.8 cm diameter full-thickness tear of  the mid to distal supraspinatus tendon. Remainder of the rotator cuff  is intact.    Labral Structures: Blunted abnormal appearance of the " posterior aspect  of the superior labrum consistent with labral degeneration or labral  tearing. There is a sublabral foramen versus labral tear superior  quadrant of the anterior labrum.    Biceps Tendon: No tear, dislocation, or significant tendinosis.    Osseous and Cartilaginous Structures: Mild to moderate glenohumeral  degenerative changes. Bone marrow signal is unremarkable except for  mild cystic resorptive changes greater tuberosity region.    Additional Findings: No joint effusion or periarticular cystic  lesion. No focal muscle atrophy.    IMPRESSION:  1. Mildly curved type II acromion. Small subacromial spur.  2. There is a small 0.8 cm full-thickness diameter tear of the mid to  distal supraspinatus tendon.  3. Blunted abnormal appearance of the posterior aspect of the superior  labrum consistent with labral degeneration or labral tearing.  4. Sublabral foramen versus labral tear superior quadrant of the  anterior labrum.  5. Mild to moderate glenohumeral degenerative changes.        Impression: right trapezial muscle pain/neck pain    Plan:  All of the above pertinent physical exam and imaging modalities findings was reviewed with Coby.    We discussed her findings.  Nothing from the joint perspective that I am concerned about.  More surrounding musculature.  She was started on zanaflex, which is helpful.  Set to see neurosurgery regarding an abnormal neck x-ray from her ER visit.  I put in a referral for therapy.        Return to clinic 6, week(s), PRN, or sooner as needed for changes.  Re-x-ray on return: No    Peewee Rogers D.O.

## 2025-03-21 ENCOUNTER — PRE VISIT (OUTPATIENT)
Dept: NEUROSURGERY | Facility: CLINIC | Age: 85
End: 2025-03-21

## 2025-03-21 ENCOUNTER — HOSPITAL ENCOUNTER (OUTPATIENT)
Dept: GENERAL RADIOLOGY | Facility: CLINIC | Age: 85
Discharge: HOME | End: 2025-03-21
Attending: NURSE PRACTITIONER | Admitting: NURSE PRACTITIONER
Payer: COMMERCIAL

## 2025-03-21 DIAGNOSIS — M43.12 ANTEROLISTHESIS OF CERVICAL SPINE: ICD-10-CM

## 2025-03-21 DIAGNOSIS — M54.12 CERVICAL RADICULOPATHY: ICD-10-CM

## 2025-03-21 PROCEDURE — 72040 X-RAY EXAM NECK SPINE 2-3 VW: CPT

## 2025-03-31 ENCOUNTER — HOSPITAL ENCOUNTER (OUTPATIENT)
Dept: MRI IMAGING | Facility: CLINIC | Age: 85
Discharge: HOME | End: 2025-03-31
Attending: NURSE PRACTITIONER | Admitting: NURSE PRACTITIONER
Payer: COMMERCIAL

## 2025-03-31 DIAGNOSIS — M54.12 CERVICAL RADICULOPATHY: ICD-10-CM

## 2025-03-31 DIAGNOSIS — M43.12 ANTEROLISTHESIS OF CERVICAL SPINE: ICD-10-CM

## 2025-03-31 PROCEDURE — 72141 MRI NECK SPINE W/O DYE: CPT

## 2025-04-07 DIAGNOSIS — M81.0 AGE-RELATED OSTEOPOROSIS WITHOUT CURRENT PATHOLOGICAL FRACTURE: ICD-10-CM

## 2025-04-07 RX ORDER — ALENDRONATE SODIUM 70 MG/1
70 TABLET ORAL WEEKLY
Qty: 12 TABLET | Refills: 0 | Status: SHIPPED | OUTPATIENT
Start: 2025-04-07

## 2025-04-10 ENCOUNTER — TELEPHONE (OUTPATIENT)
Dept: NEUROSURGERY | Facility: CLINIC | Age: 85
End: 2025-04-10
Payer: COMMERCIAL

## 2025-04-10 NOTE — TELEPHONE ENCOUNTER
Attempted to reach patient to remind them about appointment scheduled with Radha Yang NP on 4/11/25 in our [facility].    A voicemail was left with a call back number if the patient has questions or would like to reschedule.

## 2025-04-16 ENCOUNTER — OFFICE VISIT (OUTPATIENT)
Dept: FAMILY MEDICINE | Facility: CLINIC | Age: 85
End: 2025-04-16
Payer: COMMERCIAL

## 2025-04-16 VITALS
SYSTOLIC BLOOD PRESSURE: 137 MMHG | WEIGHT: 111.4 LBS | BODY MASS INDEX: 21.03 KG/M2 | TEMPERATURE: 98.3 F | RESPIRATION RATE: 18 BRPM | HEIGHT: 61 IN | DIASTOLIC BLOOD PRESSURE: 80 MMHG | OXYGEN SATURATION: 97 % | HEART RATE: 82 BPM

## 2025-04-16 DIAGNOSIS — E53.8 VITAMIN B12 DEFICIENCY (NON ANEMIC): ICD-10-CM

## 2025-04-16 DIAGNOSIS — E78.5 HYPERLIPIDEMIA LDL GOAL <130: ICD-10-CM

## 2025-04-16 DIAGNOSIS — R53.83 OTHER FATIGUE: ICD-10-CM

## 2025-04-16 DIAGNOSIS — I10 HYPERTENSION GOAL BP (BLOOD PRESSURE) < 140/90: ICD-10-CM

## 2025-04-16 DIAGNOSIS — Z00.00 ENCOUNTER FOR MEDICARE ANNUAL WELLNESS EXAM: Primary | ICD-10-CM

## 2025-04-16 DIAGNOSIS — E55.9 VITAMIN D DEFICIENCY: ICD-10-CM

## 2025-04-16 DIAGNOSIS — Z13.29 SCREENING FOR THYROID DISORDER: ICD-10-CM

## 2025-04-16 DIAGNOSIS — K13.70 ORAL LESION: ICD-10-CM

## 2025-04-16 LAB
ANION GAP SERPL CALCULATED.3IONS-SCNC: 8 MMOL/L (ref 7–15)
BUN SERPL-MCNC: 18.5 MG/DL (ref 8–23)
CALCIUM SERPL-MCNC: 10.5 MG/DL (ref 8.8–10.4)
CHLORIDE SERPL-SCNC: 99 MMOL/L (ref 98–107)
CHOLEST SERPL-MCNC: 194 MG/DL
CREAT SERPL-MCNC: 0.86 MG/DL (ref 0.51–0.95)
EGFRCR SERPLBLD CKD-EPI 2021: 66 ML/MIN/1.73M2
ERYTHROCYTE [DISTWIDTH] IN BLOOD BY AUTOMATED COUNT: 13.2 % (ref 10–15)
FASTING STATUS PATIENT QL REPORTED: NO
FASTING STATUS PATIENT QL REPORTED: NO
GLUCOSE SERPL-MCNC: 94 MG/DL (ref 70–99)
HCO3 SERPL-SCNC: 25 MMOL/L (ref 22–29)
HCT VFR BLD AUTO: 36.9 % (ref 35–47)
HDLC SERPL-MCNC: 47 MG/DL
HGB BLD-MCNC: 12.7 G/DL (ref 11.7–15.7)
LDLC SERPL CALC-MCNC: 120 MG/DL
MCH RBC QN AUTO: 32.9 PG (ref 26.5–33)
MCHC RBC AUTO-ENTMCNC: 34.4 G/DL (ref 31.5–36.5)
MCV RBC AUTO: 96 FL (ref 78–100)
NONHDLC SERPL-MCNC: 147 MG/DL
PLATELET # BLD AUTO: 283 10E3/UL (ref 150–450)
POTASSIUM SERPL-SCNC: 5.1 MMOL/L (ref 3.4–5.3)
RBC # BLD AUTO: 3.86 10E6/UL (ref 3.8–5.2)
SODIUM SERPL-SCNC: 132 MMOL/L (ref 135–145)
TRIGL SERPL-MCNC: 134 MG/DL
TSH SERPL DL<=0.005 MIU/L-ACNC: 0.69 UIU/ML (ref 0.3–4.2)
VIT B12 SERPL-MCNC: 2883 PG/ML (ref 232–1245)
VIT D+METAB SERPL-MCNC: 59 NG/ML (ref 20–50)
WBC # BLD AUTO: 9.4 10E3/UL (ref 4–11)

## 2025-04-16 PROCEDURE — 36415 COLL VENOUS BLD VENIPUNCTURE: CPT | Performed by: PHYSICIAN ASSISTANT

## 2025-04-16 RX ORDER — LISINOPRIL 20 MG/1
20 TABLET ORAL DAILY
Qty: 90 TABLET | Refills: 4 | Status: SHIPPED | OUTPATIENT
Start: 2025-04-16

## 2025-04-16 SDOH — HEALTH STABILITY: PHYSICAL HEALTH: ON AVERAGE, HOW MANY DAYS PER WEEK DO YOU ENGAGE IN MODERATE TO STRENUOUS EXERCISE (LIKE A BRISK WALK)?: 3 DAYS

## 2025-04-16 ASSESSMENT — PAIN SCALES - GENERAL: PAINLEVEL_OUTOF10: MODERATE PAIN (5)

## 2025-04-16 ASSESSMENT — SOCIAL DETERMINANTS OF HEALTH (SDOH): HOW OFTEN DO YOU GET TOGETHER WITH FRIENDS OR RELATIVES?: ONCE A WEEK

## 2025-04-16 NOTE — PATIENT INSTRUCTIONS
Patient Education   Preventive Care Advice   This is general advice given by our system to help you stay healthy. However, your care team may have specific advice just for you. Please talk to your care team about your preventive care needs.  Nutrition  Eat 5 or more servings of fruits and vegetables each day.  Try wheat bread, brown rice and whole grain pasta (instead of white bread, rice, and pasta).  Get enough calcium and vitamin D. Check the label on foods and aim for 100% of the RDA (recommended daily allowance).  Lifestyle  Exercise at least 150 minutes each week  (30 minutes a day, 5 days a week).  Do muscle strengthening activities 2 days a week. These help control your weight and prevent disease.  No smoking.  Wear sunscreen to prevent skin cancer.  Have a dental exam and cleaning every 6 months.  Yearly exams  See your health care team every year to talk about:  Any changes in your health.  Any medicines your care team has prescribed.  Preventive care, family planning, and ways to prevent chronic diseases.  Shots (vaccines)   HPV shots (up to age 26), if you've never had them before.  Hepatitis B shots (up to age 59), if you've never had them before.  COVID-19 shot: Get this shot when it's due.  Flu shot: Get a flu shot every year.  Tetanus shot: Get a tetanus shot every 10 years.  Pneumococcal, hepatitis A, and RSV shots: Ask your care team if you need these based on your risk.  Shingles shot (for age 50 and up)  General health tests  Diabetes screening:  Starting at age 35, Get screened for diabetes at least every 3 years.  If you are younger than age 35, ask your care team if you should be screened for diabetes.  Cholesterol test: At age 39, start having a cholesterol test every 5 years, or more often if advised.  Bone density scan (DEXA): At age 50, ask your care team if you should have this scan for osteoporosis (brittle bones).  Hepatitis C: Get tested at least once in your life.  STIs (sexually  transmitted infections)  Before age 24: Ask your care team if you should be screened for STIs.  After age 24: Get screened for STIs if you're at risk. You are at risk for STIs (including HIV) if:  You are sexually active with more than one person.  You don't use condoms every time.  You or a partner was diagnosed with a sexually transmitted infection.  If you are at risk for HIV, ask about PrEP medicine to prevent HIV.  Get tested for HIV at least once in your life, whether you are at risk for HIV or not.  Cancer screening tests  Cervical cancer screening: If you have a cervix, begin getting regular cervical cancer screening tests starting at age 21.  Breast cancer scan (mammogram): If you've ever had breasts, begin having regular mammograms starting at age 40. This is a scan to check for breast cancer.  Colon cancer screening: It is important to start screening for colon cancer at age 45.  Have a colonoscopy test every 10 years (or more often if you're at risk) Or, ask your provider about stool tests like a FIT test every year or Cologuard test every 3 years.  To learn more about your testing options, visit:   .  For help making a decision, visit:   https://bit.ly/hl22516.  Prostate cancer screening test: If you have a prostate, ask your care team if a prostate cancer screening test (PSA) at age 55 is right for you.  Lung cancer screening: If you are a current or former smoker ages 50 to 80, ask your care team if ongoing lung cancer screenings are right for you.  For informational purposes only. Not to replace the advice of your health care provider. Copyright   2023 [facility]. All rights reserved. Clinically reviewed by the [facility] Transitions Program. [employer] 740610 - REV 01/24.  Preventing Falls: Care Instructions  Injuries and health problems such as trouble walking or poor eyesight can increase your risk of falling. So can some medicines. But there are things you can do to help  "prevent falls. You can exercise to get stronger. You can also arrange your home to make it safer.    Talk to your doctor about the medicines you take. Ask if any of them increase the risk of falls and whether they can be changed or stopped.   Try to exercise regularly. It can help improve your strength and balance. This can help lower your risk of falling.         Practice fall safety and prevention.   Wear low-heeled shoes that fit well and give your feet good support. Talk to your doctor if you have foot problems that make this hard.  Carry a cellphone or wear a medical alert device that you can use to call for help.  Use stepladders instead of chairs to reach high objects. Don't climb if you're at risk for falls. Ask for help, if needed.  Wear the correct eyeglasses, if you need them.        Make your home safer.   Remove rugs, cords, clutter, and furniture from walkways.  Keep your house well lit. Use night-lights in hallways and bathrooms.  Install and use sturdy handrails on stairways.  Wear nonskid footwear, even inside. Don't walk barefoot or in socks without shoes.        Be safe outside.   Use handrails, curb cuts, and ramps whenever possible.  Keep your hands free by using a shoulder bag or backpack.  Try to walk in well-lit areas. Watch out for uneven ground, changes in pavement, and debris.  Be careful in the winter. Walk on the grass or gravel when sidewalks are slippery. Use de-icer on steps and walkways. Add non-slip devices to shoes.    Put grab bars and nonskid mats in your shower or tub and near the toilet. Try to use a shower chair or bath bench when bathing.   Get into a tub or shower by putting in your weaker leg first. Get out with your strong side first. Have a phone or medical alert device in the bathroom with you.   Where can you learn more?  Go to https://www.Infobloxwise.net/patiented  Enter G117 in the search box to learn more about \"Preventing Falls: Care Instructions.\"  Current as of: " July 31, 2024  Content Version: 14.4    1841-8368 [employer].   Care instructions adapted under license by your healthcare professional. If you have questions about a medical condition or this instruction, always ask your healthcare professional. [employer] disclaims any warranty or liability for your use of this information.    Hearing Loss: Care Instructions  Overview     Hearing loss is a sudden or slow decrease in how well you hear. It can range from slight to profound. Permanent hearing loss can occur with aging. It also can happen when you are exposed long-term to loud noise. Examples include listening to loud music, riding motorcycles, or being around other loud machines.  Hearing loss can affect your work and home life. It can make you feel lonely or depressed. You may feel that you have lost your independence. But hearing aids and other devices can help you hear better and feel connected to others.  Follow-up care is a key part of your treatment and safety. Be sure to make and go to all appointments, and call your doctor if you are having problems. It's also a good idea to know your test results and keep a list of the medicines you take.  How can you care for yourself at home?  Avoid loud noises whenever possible. This helps keep your hearing from getting worse.  Always wear hearing protection around loud noises.  Wear a hearing aid as directed.  A professional can help you pick a hearing aid that will work best for you.  You can also get hearing aids over the counter for mild to moderate hearing loss.  Have hearing tests as your doctor suggests. They can show whether your hearing has changed. Your hearing aid may need to be adjusted.  Use other devices as needed. These may include:  Telephone amplifiers and hearing aids that can connect to a television, stereo, radio, or microphone.  Devices that use lights or vibrations. These alert you to the doorbell, a ringing telephone, or a  "baby monitor.  Television closed-captioning. This shows the words at the bottom of the screen. Most new TVs can do this.  TTY (text telephone). This lets you type messages back and forth on the telephone instead of talking or listening. These devices are also called TDD. When messages are typed on the keyboard, they are sent over the phone line to a receiving TTY. The message is shown on a monitor.  Use text messaging, social media, and email if it is hard for you to communicate by telephone.  Try to learn a listening technique called speechreading. It is not lipreading. You pay attention to people's gestures, expressions, posture, and tone of voice. These clues can help you understand what a person is saying. Face the person you are talking to, and have them face you. Make sure the lighting is good. You need to see the other person's face clearly.  Think about counseling if you need help to adjust to your hearing loss.  When should you call for help?  Watch closely for changes in your health, and be sure to contact your doctor if:    You think your hearing is getting worse.     You have new symptoms, such as dizziness or nausea.   Where can you learn more?  Go to https://www.Acrinta.net/patiented  Enter R798 in the search box to learn more about \"Hearing Loss: Care Instructions.\"  Current as of: October 27, 2024  Content Version: 14.4 2024-2025 [employer].   Care instructions adapted under license by your healthcare professional. If you have questions about a medical condition or this instruction, always ask your healthcare professional. [employer] disclaims any warranty or liability for your use of this information.    Learning About Sleeping Well  What does sleeping well mean?     Sleeping well means getting enough sleep to feel good and stay healthy. How much sleep is enough varies among people.  The number of hours you sleep and how you feel when you wake up are both important. If " you do not feel refreshed, you probably need more sleep. Another sign of not getting enough sleep is feeling tired during the day.  Experts recommend that adults get at least 7 or more hours of sleep per day. Children and older adults need more sleep.  Why is getting enough sleep important?  Getting enough quality sleep is a basic part of good health. When your sleep suffers, your physical health, mood, and your thoughts can suffer too. You may find yourself feeling more grumpy or stressed. Not getting enough sleep also can lead to serious problems, including injury, accidents, anxiety, and depression.  What might cause poor sleeping?  Many things can cause sleep problems, including:  Changes to your sleep schedule.  Stress. Stress can be caused by fear about a single event, such as giving a speech. Or you may have ongoing stress, such as worry about work or school.  Depression, anxiety, and other mental or emotional conditions.  Changes in your sleep habits or surroundings. This includes changes that happen where you sleep, such as noise, light, or sleeping in a different bed. It also includes changes in your sleep pattern, such as having jet lag or working a late shift.  Health problems, such as pain, breathing problems, and restless legs syndrome.  Lack of regular exercise.  Using alcohol, nicotine, or caffeine before bed.  How can you help yourself?  Here are some tips that may help you sleep more soundly and wake up feeling more refreshed.  Your sleeping area   Use your bedroom only for sleeping and sex. A bit of light reading may help you fall asleep. But if it doesn't, do your reading elsewhere in the house. Try not to use your TV, computer, smartphone, or tablet while you are in bed.  Be sure your bed is big enough to stretch out comfortably, especially if you have a sleep partner.  Keep your bedroom quiet, dark, and cool. Use curtains, blinds, or a sleep mask to block out light. To block out noise, use  "earplugs, soothing music, or a \"white noise\" machine.  Your evening and bedtime routine   Create a relaxing bedtime routine. You might want to take a warm shower or bath, or listen to soothing music.  Go to bed at the same time every night. And get up at the same time every morning, even if you feel tired.  What to avoid   Limit caffeine (coffee, tea, caffeinated sodas) during the day, and don't have any for at least 6 hours before bedtime.  Avoid drinking alcohol before bedtime. Alcohol can cause you to wake up more often during the night.  Try not to smoke or use tobacco, especially in the evening. Nicotine can keep you awake.  Limit naps during the day, especially close to bedtime.  Avoid lying in bed awake for too long. If you can't fall asleep or if you wake up in the middle of the night and can't get back to sleep within about 20 minutes, get out of bed and go to another room until you feel sleepy.  Avoid taking medicine right before bed that may keep you awake or make you feel hyper or energized. Your doctor can tell you if your medicine may do this and if you can take it earlier in the day.  If you can't sleep   Imagine yourself in a peaceful, pleasant scene. Focus on the details and feelings of being in a place that is relaxing.  Get up and do a quiet or boring activity until you feel sleepy.  Avoid drinking any liquids before going to bed to help prevent waking up often to use the bathroom.  Where can you learn more?  Go to https://www.eBrisk Video.net/patiented  Enter J942 in the search box to learn more about \"Learning About Sleeping Well.\"  Current as of: July 31, 2024  Content Version: 14.4 2024-2025 [employer].   Care instructions adapted under license by your healthcare professional. If you have questions about a medical condition or this instruction, always ask your healthcare professional. [employer] disclaims any warranty or liability for your use of this " information.    Bladder Training: Care Instructions  Your Care Instructions     Bladder training is used to treat urge incontinence and stress incontinence. Urge incontinence means that the need to urinate comes on so fast that you can't get to a toilet in time. Stress incontinence means that you leak urine because of pressure on your bladder. For example, it may happen when you laugh, cough, or lift something heavy.  Bladder training can increase how long you can wait before you have to urinate. It can also help your bladder hold more urine. And it can give you better control over the urge to urinate.  It is important to remember that bladder training takes a few weeks to a few months to make a difference. You may not see results right away, but don't give up.  Follow-up care is a key part of your treatment and safety. Be sure to make and go to all appointments, and call your doctor if you are having problems. It's also a good idea to know your test results and keep a list of the medicines you take.  How can you care for yourself at home?  Work with your doctor to come up with a bladder training program that is right for you. You may use one or more of the following methods.  Delayed urination  In the beginning, try to keep from urinating for 5 minutes after you first feel the need to go.  While you wait, take deep, slow breaths to relax. Kegel exercises can also help you delay the need to go to the bathroom.  After some practice, when you can easily wait 5 minutes to urinate, try to wait 10 minutes before you urinate.  Slowly increase the waiting period until you are able to control when you have to urinate.  Scheduled urination  Empty your bladder when you first wake up in the morning.  Schedule times throughout the day when you will urinate.  Start by going to the bathroom every hour, even if you don't need to go.  Slowly increase the time between trips to the bathroom.  When you have found a schedule that works  "well for you, keep doing it.  If you wake up during the night and have to urinate, do it. Apply your schedule to waking hours only.  Kegel exercises  These tighten and strengthen pelvic muscles, which can help you control the flow of urine. (If doing these exercises causes pain, stop doing them and talk with your doctor.) To do Kegel exercises:  Squeeze your muscles as if you were trying not to pass gas. Or squeeze your muscles as if you were stopping the flow of urine. Your belly, legs, and buttocks shouldn't move.  Hold the squeeze for 3 seconds, then relax for 5 to 10 seconds.  Start with 3 seconds, then add 1 second each week until you are able to squeeze for 10 seconds.  Repeat the exercise 10 times a session. Do 3 to 8 sessions a day.  When should you call for help?  Watch closely for changes in your health, and be sure to contact your doctor if:    Your incontinence is getting worse.     You do not get better as expected.   Where can you learn more?  Go to https://www.[employer]ility.net/patiented  Enter V684 in the search box to learn more about \"Bladder Training: Care Instructions.\"  Current as of: April 30, 2024  Content Version: 14.4    4544-2867 [employer].   Care instructions adapted under license by your healthcare professional. If you have questions about a medical condition or this instruction, always ask your healthcare professional. [employer] disclaims any warranty or liability for your use of this information.    Substance Use Disorder: Care Instructions  Overview     You can improve your life and health by stopping your use of alcohol or drugs. When you don't drink or use drugs, you may feel and sleep better. You may get along better with your family, friends, and coworkers. There are medicines and programs that can help with substance use disorder.  How can you care for yourself at home?  Here are some ways to help you stay sober and prevent relapse.  If you have been given " medicine to help keep you sober or reduce your cravings, be sure to take it exactly as prescribed.  Talk to your doctor about programs that can help you stop using drugs or drinking alcohol.  Do not keep alcohol or drugs in your home.  Plan ahead. Think about what you'll say if other people ask you to drink or use drugs. Try not to spend time with people who drink or use drugs.  Use the time and money spent on drinking or drugs to do something that's important to you.  Preventing a relapse  Have a plan to deal with relapse. Learn to recognize changes in your thinking that lead you to drink or use drugs. Get help before you start to drink or use drugs again.  Try to stay away from situations, friends, or places that may lead you to drink or use drugs.  If you feel the need to drink alcohol or use drugs again, seek help right away. Call a trusted friend or family member. Some people get support from organizations such as Narcotics Anonymous or [employer] or from treatment facilities.  If you relapse, get help as soon as you can. Some people make a plan with another person that outlines what they want that person to do for them if they relapse. The plan usually includes how to handle the relapse and who to notify in case of relapse.  Don't give up. Remember that a relapse doesn't mean that you have failed. Use the experience to learn the triggers that lead you to drink or use drugs. Then quit again. Recovery is a lifelong process. Many people have several relapses before they are able to quit for good.  Follow-up care is a key part of your treatment and safety. Be sure to make and go to all appointments, and call your doctor if you are having problems. It's also a good idea to know your test results and keep a list of the medicines you take.  When should you call for help?   Call 911  anytime you think you may need emergency care. For example, call if you or someone else:    Has overdosed or has withdrawal signs.  "Be sure to tell the emergency workers that you are or someone else is using or trying to quit using drugs. Overdose or withdrawal signs may include:  Losing consciousness.  Seizure.  Seeing or hearing things that aren't there (hallucinations).     Is thinking or talking about suicide or harming others.   Where to get help 24 hours a day, 7 days a week   If you or someone you know talks about suicide, self-harm, a mental health crisis, a substance use crisis, or any other kind of emotional distress, get help right away. You can:    Call the Suicide and Crisis Lifeline at 238.     Call 3-910-009-TALK (1-400.428.4649).     Text HOME to 878876 to access the Crisis Text Line.   Consider saving these numbers in your phone.  Go to [employer] for more information or to chat online.  Call your doctor now or seek immediate medical care if:    You are having withdrawal symptoms. These may include nausea or vomiting, sweating, shakiness, and anxiety.   Watch closely for changes in your health, and be sure to contact your doctor if:    You have a relapse.     You need more help or support to stop.   Where can you learn more?  Go to https://www.citibuddies.net/patiented  Enter H573 in the search box to learn more about \"Substance Use Disorder: Care Instructions.\"  Current as of: August 20, 2024  Content Version: 14.4    3672-8181 [employer].   Care instructions adapted under license by your healthcare professional. If you have questions about a medical condition or this instruction, always ask your healthcare professional. [employer] disclaims any warranty or liability for your use of this information.       Patient Education   Preventive Care Advice   This is general advice given by our system to help you stay healthy. However, your care team may have specific advice just for you. Please talk to your care team about your preventive care needs.  Nutrition  Eat 5 or more servings of fruits and " vegetables each day.  Try wheat bread, brown rice and whole grain pasta (instead of white bread, rice, and pasta).  Get enough calcium and vitamin D. Check the label on foods and aim for 100% of the RDA (recommended daily allowance).  Lifestyle  Exercise at least 150 minutes each week  (30 minutes a day, 5 days a week).  Do muscle strengthening activities 2 days a week. These help control your weight and prevent disease.  No smoking.  Wear sunscreen to prevent skin cancer.  Have a dental exam and cleaning every 6 months.  Yearly exams  See your health care team every year to talk about:  Any changes in your health.  Any medicines your care team has prescribed.  Preventive care, family planning, and ways to prevent chronic diseases.  Shots (vaccines)   HPV shots (up to age 26), if you've never had them before.  Hepatitis B shots (up to age 59), if you've never had them before.  COVID-19 shot: Get this shot when it's due.  Flu shot: Get a flu shot every year.  Tetanus shot: Get a tetanus shot every 10 years.  Pneumococcal, hepatitis A, and RSV shots: Ask your care team if you need these based on your risk.  Shingles shot (for age 50 and up)  General health tests  Diabetes screening:  Starting at age 35, Get screened for diabetes at least every 3 years.  If you are younger than age 35, ask your care team if you should be screened for diabetes.  Cholesterol test: At age 39, start having a cholesterol test every 5 years, or more often if advised.  Bone density scan (DEXA): At age 50, ask your care team if you should have this scan for osteoporosis (brittle bones).  Hepatitis C: Get tested at least once in your life.  STIs (sexually transmitted infections)  Before age 24: Ask your care team if you should be screened for STIs.  After age 24: Get screened for STIs if you're at risk. You are at risk for STIs (including HIV) if:  You are sexually active with more than one person.  You don't use condoms every time.  You or a  partner was diagnosed with a sexually transmitted infection.  If you are at risk for HIV, ask about PrEP medicine to prevent HIV.  Get tested for HIV at least once in your life, whether you are at risk for HIV or not.  Cancer screening tests  Cervical cancer screening: If you have a cervix, begin getting regular cervical cancer screening tests starting at age 21.  Breast cancer scan (mammogram): If you've ever had breasts, begin having regular mammograms starting at age 40. This is a scan to check for breast cancer.  Colon cancer screening: It is important to start screening for colon cancer at age 45.  Have a colonoscopy test every 10 years (or more often if you're at risk) Or, ask your provider about stool tests like a FIT test every year or Cologuard test every 3 years.  To learn more about your testing options, visit:   .  For help making a decision, visit:   https://bit.ly/se61461.  Prostate cancer screening test: If you have a prostate, ask your care team if a prostate cancer screening test (PSA) at age 55 is right for you.  Lung cancer screening: If you are a current or former smoker ages 50 to 80, ask your care team if ongoing lung cancer screenings are right for you.  For informational purposes only. Not to replace the advice of your health care provider. Copyright   2023 [facility]. All rights reserved. Clinically reviewed by the [facility]itions [facility]. [employer] [id number] - REV 01/24.  Preventing Falls: Care Instructions  Injuries and health problems such as trouble walking or poor eyesight can increase your risk of falling. So can some medicines. But there are things you can do to help prevent falls. You can exercise to get stronger. You can also arrange your home to make it safer.    Talk to your doctor about the medicines you take. Ask if any of them increase the risk of falls and whether they can be changed or stopped.   Try to exercise regularly. It can help improve your  "strength and balance. This can help lower your risk of falling.         Practice fall safety and prevention.   Wear low-heeled shoes that fit well and give your feet good support. Talk to your doctor if you have foot problems that make this hard.  Carry a cellphone or wear a medical alert device that you can use to call for help.  Use stepladders instead of chairs to reach high objects. Don't climb if you're at risk for falls. Ask for help, if needed.  Wear the correct eyeglasses, if you need them.        Make your home safer.   Remove rugs, cords, clutter, and furniture from walkways.  Keep your house well lit. Use night-lights in hallways and bathrooms.  Install and use sturdy handrails on stairways.  Wear nonskid footwear, even inside. Don't walk barefoot or in socks without shoes.        Be safe outside.   Use handrails, curb cuts, and ramps whenever possible.  Keep your hands free by using a shoulder bag or backpack.  Try to walk in well-lit areas. Watch out for uneven ground, changes in pavement, and debris.  Be careful in the winter. Walk on the grass or gravel when sidewalks are slippery. Use de-icer on steps and walkways. Add non-slip devices to shoes.    Put grab bars and nonskid mats in your shower or tub and near the toilet. Try to use a shower chair or bath bench when bathing.   Get into a tub or shower by putting in your weaker leg first. Get out with your strong side first. Have a phone or medical alert device in the bathroom with you.   Where can you learn more?  Go to https://www.[employer].net/patiented  Enter G117 in the search box to learn more about \"Preventing Falls: Care Instructions.\"  Current as of: July 31, 2024  Content Version: 14.4    9995-6720 [employer].   Care instructions adapted under license by your healthcare professional. If you have questions about a medical condition or this instruction, always ask your healthcare professional. [employer] disclaims any " warranty or liability for your use of this information.    Hearing Loss: Care Instructions  Overview     Hearing loss is a sudden or slow decrease in how well you hear. It can range from slight to profound. Permanent hearing loss can occur with aging. It also can happen when you are exposed long-term to loud noise. Examples include listening to loud music, riding motorcycles, or being around other loud machines.  Hearing loss can affect your work and home life. It can make you feel lonely or depressed. You may feel that you have lost your independence. But hearing aids and other devices can help you hear better and feel connected to others.  Follow-up care is a key part of your treatment and safety. Be sure to make and go to all appointments, and call your doctor if you are having problems. It's also a good idea to know your test results and keep a list of the medicines you take.  How can you care for yourself at home?  Avoid loud noises whenever possible. This helps keep your hearing from getting worse.  Always wear hearing protection around loud noises.  Wear a hearing aid as directed.  A professional can help you pick a hearing aid that will work best for you.  You can also get hearing aids over the counter for mild to moderate hearing loss.  Have hearing tests as your doctor suggests. They can show whether your hearing has changed. Your hearing aid may need to be adjusted.  Use other devices as needed. These may include:  Telephone amplifiers and hearing aids that can connect to a television, stereo, radio, or microphone.  Devices that use lights or vibrations. These alert you to the doorbell, a ringing telephone, or a baby monitor.  Television closed-captioning. This shows the words at the bottom of the screen. Most new TVs can do this.  TTY (text telephone). This lets you type messages back and forth on the telephone instead of talking or listening. These devices are also called TDD. When messages are typed on  "the keyboard, they are sent over the phone line to a receiving TTY. The message is shown on a monitor.  Use text messaging, social media, and email if it is hard for you to communicate by telephone.  Try to learn a listening technique called speechreading. It is not lipreading. You pay attention to people's gestures, expressions, posture, and tone of voice. These clues can help you understand what a person is saying. Face the person you are talking to, and have them face you. Make sure the lighting is good. You need to see the other person's face clearly.  Think about counseling if you need help to adjust to your hearing loss.  When should you call for help?  Watch closely for changes in your health, and be sure to contact your doctor if:    You think your hearing is getting worse.     You have new symptoms, such as dizziness or nausea.   Where can you learn more?  Go to https://www.[employer]/patiented  Enter R798 in the search box to learn more about \"Hearing Loss: Care Instructions.\"  Current as of: October 27, 2024  Content Version: 14.4    2974-3791 [employer].   Care instructions adapted under license by your healthcare professional. If you have questions about a medical condition or this instruction, always ask your healthcare professional. [employer] disclaims any warranty or liability for your use of this information.    Learning About Sleeping Well  What does sleeping well mean?     Sleeping well means getting enough sleep to feel good and stay healthy. How much sleep is enough varies among people.  The number of hours you sleep and how you feel when you wake up are both important. If you do not feel refreshed, you probably need more sleep. Another sign of not getting enough sleep is feeling tired during the day.  Experts recommend that adults get at least 7 or more hours of sleep per day. Children and older adults need more sleep.  Why is getting enough sleep " "important?  Getting enough quality sleep is a basic part of good health. When your sleep suffers, your physical health, mood, and your thoughts can suffer too. You may find yourself feeling more grumpy or stressed. Not getting enough sleep also can lead to serious problems, including injury, accidents, anxiety, and depression.  What might cause poor sleeping?  Many things can cause sleep problems, including:  Changes to your sleep schedule.  Stress. Stress can be caused by fear about a single event, such as giving a speech. Or you may have ongoing stress, such as worry about work or school.  Depression, anxiety, and other mental or emotional conditions.  Changes in your sleep habits or surroundings. This includes changes that happen where you sleep, such as noise, light, or sleeping in a different bed. It also includes changes in your sleep pattern, such as having jet lag or working a late shift.  Health problems, such as pain, breathing problems, and restless legs syndrome.  Lack of regular exercise.  Using alcohol, nicotine, or caffeine before bed.  How can you help yourself?  Here are some tips that may help you sleep more soundly and wake up feeling more refreshed.  Your sleeping area   Use your bedroom only for sleeping and sex. A bit of light reading may help you fall asleep. But if it doesn't, do your reading elsewhere in the house. Try not to use your TV, computer, smartphone, or tablet while you are in bed.  Be sure your bed is big enough to stretch out comfortably, especially if you have a sleep partner.  Keep your bedroom quiet, dark, and cool. Use curtains, blinds, or a sleep mask to block out light. To block out noise, use earplugs, soothing music, or a \"white noise\" machine.  Your evening and bedtime routine   Create a relaxing bedtime routine. You might want to take a warm shower or bath, or listen to soothing music.  Go to bed at the same time every night. And get up at the same time every morning, " "even if you feel tired.  What to avoid   Limit caffeine (coffee, tea, caffeinated sodas) during the day, and don't have any for at least 6 hours before bedtime.  Avoid drinking alcohol before bedtime. Alcohol can cause you to wake up more often during the night.  Try not to smoke or use tobacco, especially in the evening. Nicotine can keep you awake.  Limit naps during the day, especially close to bedtime.  Avoid lying in bed awake for too long. If you can't fall asleep or if you wake up in the middle of the night and can't get back to sleep within about 20 minutes, get out of bed and go to another room until you feel sleepy.  Avoid taking medicine right before bed that may keep you awake or make you feel hyper or energized. Your doctor can tell you if your medicine may do this and if you can take it earlier in the day.  If you can't sleep   Imagine yourself in a peaceful, pleasant scene. Focus on the details and feelings of being in a place that is relaxing.  Get up and do a quiet or boring activity until you feel sleepy.  Avoid drinking any liquids before going to bed to help prevent waking up often to use the bathroom.  Where can you learn more?  Go to https://www.[employer].net/patiented  Enter J942 in the search box to learn more about \"Learning About Sleeping Well.\"  Current as of: July 31, 2024  Content Version: 14.4    3561-2209 [employer].   Care instructions adapted under license by your healthcare professional. If you have questions about a medical condition or this instruction, always ask your healthcare professional. [employer] disclaims any warranty or liability for your use of this information.    Bladder Training: Care Instructions  Your Care Instructions     Bladder training is used to treat urge incontinence and stress incontinence. Urge incontinence means that the need to urinate comes on so fast that you can't get to a toilet in time. Stress incontinence means that you " leak urine because of pressure on your bladder. For example, it may happen when you laugh, cough, or lift something heavy.  Bladder training can increase how long you can wait before you have to urinate. It can also help your bladder hold more urine. And it can give you better control over the urge to urinate.  It is important to remember that bladder training takes a few weeks to a few months to make a difference. You may not see results right away, but don't give up.  Follow-up care is a key part of your treatment and safety. Be sure to make and go to all appointments, and call your doctor if you are having problems. It's also a good idea to know your test results and keep a list of the medicines you take.  How can you care for yourself at home?  Work with your doctor to come up with a bladder training program that is right for you. You may use one or more of the following methods.  Delayed urination  In the beginning, try to keep from urinating for 5 minutes after you first feel the need to go.  While you wait, take deep, slow breaths to relax. Kegel exercises can also help you delay the need to go to the bathroom.  After some practice, when you can easily wait 5 minutes to urinate, try to wait 10 minutes before you urinate.  Slowly increase the waiting period until you are able to control when you have to urinate.  Scheduled urination  Empty your bladder when you first wake up in the morning.  Schedule times throughout the day when you will urinate.  Start by going to the bathroom every hour, even if you don't need to go.  Slowly increase the time between trips to the bathroom.  When you have found a schedule that works well for you, keep doing it.  If you wake up during the night and have to urinate, do it. Apply your schedule to waking hours only.  Kegel exercises  These tighten and strengthen pelvic muscles, which can help you control the flow of urine. (If doing these exercises causes pain, stop doing them  "and talk with your doctor.) To do Kegel exercises:  Squeeze your muscles as if you were trying not to pass gas. Or squeeze your muscles as if you were stopping the flow of urine. Your belly, legs, and buttocks shouldn't move.  Hold the squeeze for 3 seconds, then relax for 5 to 10 seconds.  Start with 3 seconds, then add 1 second each week until you are able to squeeze for 10 seconds.  Repeat the exercise 10 times a session. Do 3 to 8 sessions a day.  When should you call for help?  Watch closely for changes in your health, and be sure to contact your doctor if:    Your incontinence is getting worse.     You do not get better as expected.   Where can you learn more?  Go to https://www.[employer]TV.net/patiented  Enter V684 in the search box to learn more about \"Bladder Training: Care Instructions.\"  Current as of: April 30, 2024  Content Version: 14.4    1623-6525 [employer].   Care instructions adapted under license by your healthcare professional. If you have questions about a medical condition or this instruction, always ask your healthcare professional. [employer] disclaims any warranty or liability for your use of this information.    Substance Use Disorder: Care Instructions  Overview     You can improve your life and health by stopping your use of alcohol or drugs. When you don't drink or use drugs, you may feel and sleep better. You may get along better with your family, friends, and coworkers. There are medicines and programs that can help with substance use disorder.  How can you care for yourself at home?  Here are some ways to help you stay sober and prevent relapse.  If you have been given medicine to help keep you sober or reduce your cravings, be sure to take it exactly as prescribed.  Talk to your doctor about programs that can help you stop using drugs or drinking alcohol.  Do not keep alcohol or drugs in your home.  Plan ahead. Think about what you'll say if other people ask " you to drink or use drugs. Try not to spend time with people who drink or use drugs.  Use the time and money spent on drinking or drugs to do something that's important to you.  Preventing a relapse  Have a plan to deal with relapse. Learn to recognize changes in your thinking that lead you to drink or use drugs. Get help before you start to drink or use drugs again.  Try to stay away from situations, friends, or places that may lead you to drink or use drugs.  If you feel the need to drink alcohol or use drugs again, seek help right away. Call a trusted friend or family member. Some people get support from organizations such as Narcotics Anonymous or [employer] or from treatment facilities.  If you relapse, get help as soon as you can. Some people make a plan with another person that outlines what they want that person to do for them if they relapse. The plan usually includes how to handle the relapse and who to notify in case of relapse.  Don't give up. Remember that a relapse doesn't mean that you have failed. Use the experience to learn the triggers that lead you to drink or use drugs. Then quit again. Recovery is a lifelong process. Many people have several relapses before they are able to quit for good.  Follow-up care is a key part of your treatment and safety. Be sure to make and go to all appointments, and call your doctor if you are having problems. It's also a good idea to know your test results and keep a list of the medicines you take.  When should you call for help?   Call 911  anytime you think you may need emergency care. For example, call if you or someone else:    Has overdosed or has withdrawal signs. Be sure to tell the emergency workers that you are or someone else is using or trying to quit using drugs. Overdose or withdrawal signs may include:  Losing consciousness.  Seizure.  Seeing or hearing things that aren't there (hallucinations).     Is thinking or talking about suicide or harming  "others.   Where to get help 24 hours a day, 7 days a week   If you or someone you know talks about suicide, self-harm, a mental health crisis, a substance use crisis, or any other kind of emotional distress, get help right away. You can:    Call the Suicide and Crisis Lifeline at 988.     Call 2-778-827-TALK (1-213.358.1237).     Text HOME to 184481 to access the Crisis Text Line.   Consider saving these numbers in your phone.  Go to [employer] for more information or to chat online.  Call your doctor now or seek immediate medical care if:    You are having withdrawal symptoms. These may include nausea or vomiting, sweating, shakiness, and anxiety.   Watch closely for changes in your health, and be sure to contact your doctor if:    You have a relapse.     You need more help or support to stop.   Where can you learn more?  Go to https://www.[employer].net/patiented  Enter H573 in the search box to learn more about \"Substance Use Disorder: Care Instructions.\"  Current as of: August 20, 2024  Content Version: 14.4    9775-9810 [employer].   Care instructions adapted under license by your healthcare professional. If you have questions about a medical condition or this instruction, always ask your healthcare professional. [employer] disclaims any warranty or liability for your use of this information.       "

## 2025-04-16 NOTE — PROGRESS NOTES
Preventive Care Visit  [facility]  Stormy Taylor PA-C, Family Medicine  Apr 16, 2025        Chloe Tenorio is a 85 year old, presenting for the following:  Annual Visit, Hypertension (Blood pressure concerns , it has gone up thirty points ), Fatigue (Tired all the time and cold ), and Incontinence (In the morning while getting out of bed)        4/16/2025    11:02 AM   Additional Questions   Roomed by Kobi MASSEY     Has been dealing with significant neck pain. She was seen by neurosurgery for this and does have injection coming up. Has good days and bad days but this has been really wearing her out. She does note that her last several visits show her blood pressure was quite high - better today but not in as much pain.     She reports that she sleeps well. Generally about 9 hours per night. Wakes a few times to go to the bathroom but goes right back to sleep. She reports just feeling very fatigued all day.     Notes she wears a brief overnight. Has no trouble overnight but for some reason has no control when she first gets up in the morning. Not bothersome during the day. Doesn't wish to treat this just wants to mention it.     Advance Care Planning    Document on file is a Health Care Directive or POLST.        4/16/2025   General Health   How would you rate your overall physical health? (!) FAIR   Feel stress (tense, anxious, or unable to sleep) Only a little   (!) STRESS CONCERN      4/16/2025   Nutrition   Diet: Regular (no restrictions)         4/16/2025   Exercise   Days per week of moderate/strenous exercise 3 days         4/16/2025   Social Factors   Frequency of gathering with friends or relatives Once a week   Worry food won't last until get money to buy more No   Food not last or not have enough money for food? No   Do you have housing? (Housing is defined as stable permanent housing and does not include staying ouside in a car, in a tent, in an abandoned  building, in an overnight shelter, or couch-surfing.) Yes   Are you worried about losing your housing? No   Lack of transportation? No   Unable to get utilities (heat,electricity)? No         2025   Fall Risk   Fallen 2 or more times in the past year? No   Trouble with walking or balance? Yes   Gait Speed Test (Document in seconds) 5.19   Gait Speed Test Interpretation Greater than 5.01 seconds - ABNORMAL          2025   Activities of Daily Living- Home Safety   Needs help with the following daily activites None of the above   Safety concerns in the home None of the above         2025   Dental   Dentist two times every year? (!) NO         2025   Hearing Screening   Hearing concerns? (!) I FEEL THAT PEOPLE ARE MUMBLING OR NOT SPEAKING CLEARLY.         2025   Driving Risk Screening   Patient/family members have concerns about driving No         2025   General Alertness/Fatigue Screening   Have you been more tired than usual lately? (!) YES         2025   Urinary Incontinence Screening   Bothered by leaking urine in past 6 months Yes         Today's PHQ-2 Score:       2025    10:56 AM   PHQ-2 (  [employer])   Q1: Little interest or pleasure in doing things 1   Q2: Feeling down, depressed or hopeless 0   PHQ-2 Score 1    Q1: Little interest or pleasure in doing things Several days   Q2: Feeling down, depressed or hopeless Not at all   PHQ-2 Score 1       Patient-reported           2025   Substance Use   Alcohol more than 3/day or more than 7/wk No   Do you have a current opioid prescription? No   How severe/bad is pain from 1 to 10? 7/10   Do you use any other substances recreationally? (!) PRESCRIPTION DRUGS     Social History     Tobacco Use    Smoking status: Former     Current packs/day: 0.00     Types: Cigarettes     Quit date:      Years since quittin.3    Smokeless tobacco: Never   Vaping Use    Vaping status: Never Used   Substance Use Topics    Alcohol use:  Yes     Comment:  1 glass of wine with dinner     Drug use: No           11/13/2024   LAST FHS-7 RESULTS   1st degree relative breast or ovarian cancer No   Any relative bilateral breast cancer No   Any male have breast cancer No   Any ONE woman have BOTH breast AND ovarian cancer No   Any woman with breast cancer before 50yrs No   2 or more relatives with breast AND/OR ovarian cancer No   2 or more relatives with breast AND/OR bowel cancer No        Mammogram Screening - Mammogram every 1-2 years updated in Health Maintenance based on mutual decision making          Reviewed and updated as needed this visit by Provider                  Current providers sharing in care for this patient include:  Patient Care Team:  Stormy Taylor PA-C as PCP - General (Family Medicine)  Stormy Taylor PA-C as Assigned PCP  Zain Rogers DO as Assigned Musculoskeletal Provider    The following health maintenance items are reviewed in Epic and correct as of today:  Health Maintenance   Topic Date Due    ZOSTER IMMUNIZATION (1 of 2) Never done    RSV VACCINE (1 - 1-dose 75+ series) Never done    COVID-19 Vaccine (5 - 2024-25 season) 09/01/2024    MEDICARE ANNUAL WELLNESS VISIT  01/10/2025    BMP  01/10/2025    LIPID  01/10/2025    ANNUAL REVIEW OF HM ORDERS  01/10/2025    MAMMO SCREENING  11/13/2025    FALL RISK ASSESSMENT  04/16/2026    DTAP/TDAP/TD IMMUNIZATION (2 - Td or Tdap) 05/23/2026    ADVANCE CARE PLANNING  01/10/2029    DEXA  11/02/2037    PHQ-2 (once per calendar year)  Completed    INFLUENZA VACCINE  Completed    Pneumococcal Vaccine: 50+ Years  Completed    HPV IMMUNIZATION  Aged Out    MENINGITIS IMMUNIZATION  Aged Out         Review of Systems  Constitutional, HEENT, cardiovascular, pulmonary, GI, , musculoskeletal, neuro, skin, endocrine and psych systems are negative, except as otherwise noted.     Objective    Exam  /80   Pulse 82   Temp 98.3  F (36.8  C) (Temporal)   Resp 18   Ht 1.542 m  "(5' 0.71\")   Wt 50.5 kg (111 lb 6.4 oz)   LMP  (LMP Unknown)   SpO2 97%   BMI 21.25 kg/m     Estimated body mass index is 21.25 kg/m  as calculated from the following:    Height as of this encounter: 1.542 m (5' 0.71\").    Weight as of this encounter: 50.5 kg (111 lb 6.4 oz).    Physical Exam  GENERAL: alert and no distress  EYES: Eyes grossly normal to inspection, PERRL and conjunctivae and sclerae normal  HENT: normal cephalic/atraumatic, ear canals and TM's normal, oropharynx clear, oral mucous membranes moist, and oral lesion on soft palate - left side  NECK: no adenopathy, no asymmetry, masses, or scars  RESP: lungs clear to auscultation - no rales, rhonchi or wheezes  CV: regular rate and rhythm, normal S1 S2, no S3 or S4, no murmur, click or rub, no peripheral edema  ABDOMEN: soft, nontender, no hepatosplenomegaly, no masses and bowel sounds normal  MS: no gross musculoskeletal defects noted, no edema  SKIN: no suspicious lesions or rashes  NEURO: Normal strength and tone, mentation intact and speech normal  PSYCH: mentation appears normal, affect normal/bright         4/16/2025   Mini Cog   Clock Draw Score 2 Normal   3 Item Recall 3 objects recalled   Mini Cog Total Score 5          Assessment & Plan     Encounter for Medicare annual wellness exam  Vaccinations reviewed and declined at this time.    Hypertension goal BP (blood pressure) < 140/90  Dose increase to 20 mg at this time with higher blood pressure readings. I do suspect some of this is pain related. Will have patient continue to close monitor.  - BASIC METABOLIC PANEL; Future  - lisinopril (ZESTRIL) 20 MG tablet; Take 1 tablet (20 mg) by mouth daily.  - BASIC METABOLIC PANEL    Hyperlipidemia LDL goal <130  - Lipid panel reflex to direct LDL Non-fasting; Future  - Lipid panel reflex to direct LDL Non-fasting    Other fatigue  - CBC with platelets; Future  - CBC with platelets    Screening for thyroid disorder  - TSH with free T4 reflex; " Future  - TSH with free T4 reflex    Vitamin D deficiency  - Vitamin D Deficiency; Future  - Vitamin D Deficiency    Vitamin B12 deficiency (non anemic)  - Vitamin B12; Future  - Vitamin B12    Oral lesion  Patient has a dental visit in about 6 weeks. Asked her to have them check if still present at that time. If not resolving would recommend ENT consult for biopsy.     Patient has been advised of split billing requirements and indicates understanding: Yes    Counseling  Appropriate preventive services were addressed with this patient via screening, questionnaire, or discussion as appropriate for fall prevention, nutrition, physical activity, Tobacco-use cessation, social engagement, weight loss and cognition.  Checklist reviewing preventive services available has been given to the patient.  Reviewed patient's diet, addressing concerns and/or questions.   She is at risk for lack of exercise and has been provided with information to increase physical activity for the benefit of her well-being.   The patient was instructed to see the dentist every 6 months.   Discussed possible causes of fatigue. The patient was provided with written information regarding signs of hearing loss.   Information on urinary incontinence and treatment options given to patient.     Signed Electronically by: Stormy Taylor PA-C

## 2025-05-12 ENCOUNTER — NURSE TRIAGE (OUTPATIENT)
Dept: FAMILY MEDICINE | Facility: CLINIC | Age: 85
End: 2025-05-12
Payer: COMMERCIAL

## 2025-05-12 NOTE — TELEPHONE ENCOUNTER
Unlikely this is related to her Fosamax however certainly could take a break from this until she is able to be seen by her dentist for xray's.    Stormy Taylor PA-C

## 2025-05-12 NOTE — TELEPHONE ENCOUNTER
RN Triage    Patient Contact    Attempt # 1&2. Called home and mobile number.    Was call answered?  No.  Left message on voicemail with information to call me back on home and mobile number.    Fiona Sal RN on 5/12/2025 at 12:47 PM

## 2025-05-12 NOTE — TELEPHONE ENCOUNTER
Patient called back.     Writer reviewed information below from provider.     Patient verbalized understanding, agreeable to plan and no further questions at this time.    Alessia Mcclellan RN on 5/12/2025 at 4:40 PM

## 2025-05-12 NOTE — TELEPHONE ENCOUNTER
"Nurse Triage SBAR    Is this a 2nd Level Triage? NO    Situation/Background: Patient calling in with report of a new onset of jaw clicking since last week. Denies pain. Never had issues with this before. Patient wondering if Foxamax could be causing this. Has dental appointment in June if PCP recommends dental evaluation. Fosamax is not a new medication for her.     Assessment: See assessment info below.     Protocol Recommended Disposition:   No disposition on file.    Recommendation: PCP to review and advise. Team to assist with scheduling if needed for evaluation. RN did offer to schedule, but patient prefers PCP to review first.      Routed to provider      Additional Information   Negative: Sounds like a life-threatening emergency to the triager   Negative: Information only call about a Well Adult (no illness or injury)   Negative: Caller can't be reached by phone   Negative: Nursing judgment   Negative: Nursing judgment   Negative: Nursing judgment   Negative: Nursing judgment   Negative: Nursing judgment    Answer Assessment - Initial Assessment Questions  1. REASON FOR CALL: \"What is your main concern right now?\"      Jaw Clicking, new onset. LEFT side only    2. ONSET: \"When did the jaw clicking start?\"      Started last week    3. SEVERITY: \"How bad is the Jaw clicking?\"      Every time she chews/eats    4. FEVER: \"Do you have a fever?\"      No    5. OTHER SYMPTOMS: \"Do you have any other new symptoms?\"      No    6. TREATMENTS AND RESPONSE: \"What have you done so far to try to make this better? What medicines have you used?\"      None  7. PREGNANCY: \"Is there any chance you are pregnant?\" \"When was your last menstrual period?\"      No    Protocols used: No Protocol [id number]    "

## 2025-05-16 ENCOUNTER — HOSPITAL ENCOUNTER (OUTPATIENT)
Dept: GENERAL RADIOLOGY | Facility: CLINIC | Age: 85
Discharge: HOME | End: 2025-05-16
Attending: ANESTHESIOLOGY | Admitting: ANESTHESIOLOGY
Payer: COMMERCIAL

## 2025-05-16 ENCOUNTER — HOSPITAL ENCOUNTER (OUTPATIENT)
Facility: CLINIC | Age: 85
Discharge: HOME | End: 2025-05-16
Attending: ANESTHESIOLOGY | Admitting: ANESTHESIOLOGY
Payer: COMMERCIAL

## 2025-05-16 VITALS
OXYGEN SATURATION: 97 % | SYSTOLIC BLOOD PRESSURE: 152 MMHG | TEMPERATURE: 98 F | HEART RATE: 72 BPM | RESPIRATION RATE: 16 BRPM | DIASTOLIC BLOOD PRESSURE: 91 MMHG

## 2025-05-16 DIAGNOSIS — M54.12 CERVICAL RADICULOPATHY: ICD-10-CM

## 2025-05-16 PROCEDURE — 250N000011 HC RX IP 250 OP 636: Performed by: ANESTHESIOLOGY

## 2025-05-16 PROCEDURE — 62321 NJX INTERLAMINAR CRV/THRC: CPT | Performed by: ANESTHESIOLOGY

## 2025-05-16 PROCEDURE — 999N000179 XR SURGERY CARM FLUORO LESS THAN 5 MIN W STILLS

## 2025-05-16 RX ORDER — CYCLOSPORINE 0.5 MG/ML
1 EMULSION OPHTHALMIC DAILY
COMMUNITY
Start: 2025-02-26

## 2025-05-16 RX ORDER — TRIAMCINOLONE ACETONIDE 40 MG/ML
INJECTION, SUSPENSION INTRA-ARTICULAR; INTRAMUSCULAR PRN
Status: DISCONTINUED | OUTPATIENT
Start: 2025-05-16 | End: 2025-05-16 | Stop reason: HOSPADM

## 2025-05-16 RX ORDER — IOPAMIDOL 612 MG/ML
INJECTION, SOLUTION INTRATHECAL PRN
Status: DISCONTINUED | OUTPATIENT
Start: 2025-05-16 | End: 2025-05-16 | Stop reason: HOSPADM

## 2025-05-16 ASSESSMENT — ACTIVITIES OF DAILY LIVING (ADL)
ADLS_ACUITY_SCORE: 41
ADLS_ACUITY_SCORE: 41

## 2025-05-16 NOTE — OP NOTE
CHIEF COMPLAINT: Neck pain secondary to cervical spondylosis and cervical disc degeneration  PROCEDURE: C6-7 Interlaminar epidural steroid injection using fluoroscopic guidance with contrast dye.   PROCEDURE DETAILS: After written informed consent was obtained from the patient, the patient was escorted to the procedure room.  The patient was placed in the prone position.  A  time out  was conducted to verify patient identity, procedure to be performed, side, site, allergies and any special requirements.  The skin over the neck and upper back region were prepped and draped in normal sterile fashion. Fluoroscopy was used to identify the C6-7 interspace in an AP view and the skin was anesthetized with 2 mL of 1% lidocaine with bicarbonate buffer.  A 20-gauge 3-1/2 inch Tuohy needle was advanced using the loss of resistance technique with preservative free normal saline with fluoroscopic guidance. After negative aspiration for CSF and blood, 1.5 cc of Isovue contrast dye was injected revealing the appropriate cervical epidurogram without evidence of intrathecal or intravascular spread. Following this, a 3-mL solution of 40 mg of triamcinolone with 2 cc preservative-free normal saline was slowly injected.  There is very good flow of medication covering the entire epidural space from approximately C4 down to T2 bilaterally.  After injection of the medication, as the needle tip was withdrawn, it was flushed with local anesthetic.  The patient was monitored with blood pressure and pulse oximetry machines with the assistance of an RN throughout the procedure.  The patient was alert and responsive to questions throughout the procedure.   The patient tolerated the procedure well and was observed in the post-procedural area.  The patient was dismissed without apparent complications.      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 Neck pain secondary to cervical spondylosis and cervical disc degeneration as  "well as spondylolisthesis at C4-5 and C3-4 most likely contributing to right C4 and right C5 radicular symptoms.  PLAN:  1. Performed a repeat C6-7 interlaminar epidural steroid injection.   2. The patient was instructed to call the [place] [facility] if today's procedure is not helpful.  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  3.  For her low back pain she had SI joint injections fairly recently.  The last 2 set of SI joint injections really did not help her and therefore I would not recommend repeating those and instead I recommended intra-articular diagnostic injections at her L3-4 and L4-5 facet joints which would be L2, L3, L4 medial branch blocks.     Sawyer Webster MD  Diplomate of the American Board of Anesthesiology, Pain Medicine        "

## 2025-05-16 NOTE — DISCHARGE INSTRUCTIONS

## 2025-05-21 ENCOUNTER — LAB (OUTPATIENT)
Dept: LAB | Facility: CLINIC | Age: 85
End: 2025-05-21
Payer: COMMERCIAL

## 2025-05-21 DIAGNOSIS — E83.52 HYPERCALCEMIA: ICD-10-CM

## 2025-05-21 LAB
ANION GAP SERPL CALCULATED.3IONS-SCNC: 8 MMOL/L (ref 7–15)
BUN SERPL-MCNC: 18 MG/DL (ref 8–23)
CALCIUM SERPL-MCNC: 10.2 MG/DL (ref 8.8–10.4)
CHLORIDE SERPL-SCNC: 98 MMOL/L (ref 98–107)
CREAT SERPL-MCNC: 0.74 MG/DL (ref 0.51–0.95)
EGFRCR SERPLBLD CKD-EPI 2021: 79 ML/MIN/1.73M2
GLUCOSE SERPL-MCNC: 109 MG/DL (ref 70–99)
HCO3 SERPL-SCNC: 24 MMOL/L (ref 22–29)
POTASSIUM SERPL-SCNC: 4.1 MMOL/L (ref 3.4–5.3)
SODIUM SERPL-SCNC: 130 MMOL/L (ref 135–145)

## 2025-05-21 PROCEDURE — 80048 BASIC METABOLIC PNL TOTAL CA: CPT

## 2025-05-21 PROCEDURE — 36415 COLL VENOUS BLD VENIPUNCTURE: CPT

## 2025-05-22 ENCOUNTER — RESULTS FOLLOW-UP (OUTPATIENT)
Dept: FAMILY MEDICINE | Facility: CLINIC | Age: 85
End: 2025-05-22

## 2025-05-22 DIAGNOSIS — E87.1 HYPONATREMIA: Primary | ICD-10-CM

## 2025-05-26 NOTE — RESULT ENCOUNTER NOTE
Please notify patient /parent that her calcium is back in normal range but her sodium has further decreased. I would like to recheck again in 2 weeks with both a blood draw and urine test.     In the meantime she should make sure she is not drinking too much water and can incorporate more salt in her diet or more electrolytes with her fluid intake.     Stormy Taylor PA-C

## 2025-06-10 ENCOUNTER — LAB (OUTPATIENT)
Dept: LAB | Facility: CLINIC | Age: 85
End: 2025-06-10
Payer: COMMERCIAL

## 2025-06-10 DIAGNOSIS — E87.1 HYPONATREMIA: ICD-10-CM

## 2025-06-10 LAB
ALBUMIN UR-MCNC: NEGATIVE MG/DL
ANION GAP SERPL CALCULATED.3IONS-SCNC: 10 MMOL/L (ref 7–15)
APPEARANCE UR: CLEAR
BACTERIA #/AREA URNS HPF: ABNORMAL /HPF
BILIRUB UR QL STRIP: NEGATIVE
BUN SERPL-MCNC: 15.7 MG/DL (ref 8–23)
CALCIUM SERPL-MCNC: 10 MG/DL (ref 8.8–10.4)
CHLORIDE SERPL-SCNC: 101 MMOL/L (ref 98–107)
COLOR UR AUTO: YELLOW
CREAT SERPL-MCNC: 0.8 MG/DL (ref 0.51–0.95)
EGFRCR SERPLBLD CKD-EPI 2021: 72 ML/MIN/1.73M2
GLUCOSE SERPL-MCNC: 105 MG/DL (ref 70–99)
GLUCOSE UR STRIP-MCNC: NEGATIVE MG/DL
HCO3 SERPL-SCNC: 25 MMOL/L (ref 22–29)
HGB UR QL STRIP: NEGATIVE
KETONES UR STRIP-MCNC: NEGATIVE MG/DL
LEUKOCYTE ESTERASE UR QL STRIP: ABNORMAL
MUCOUS THREADS #/AREA URNS LPF: PRESENT /LPF
NITRATE UR QL: NEGATIVE
PH UR STRIP: 5.5 [PH] (ref 5–7)
POTASSIUM SERPL-SCNC: 4.2 MMOL/L (ref 3.4–5.3)
RBC URINE: 2 /HPF
SODIUM SERPL-SCNC: 136 MMOL/L (ref 135–145)
SP GR UR STRIP: 1.02 (ref 1–1.03)
SQUAMOUS EPITHELIAL: 1 /HPF
UROBILINOGEN UR STRIP-MCNC: NORMAL MG/DL
WBC URINE: 43 /HPF

## 2025-06-10 PROCEDURE — 81001 URINALYSIS AUTO W/SCOPE: CPT

## 2025-06-10 PROCEDURE — 87186 SC STD MICRODIL/AGAR DIL: CPT

## 2025-06-10 PROCEDURE — 80048 BASIC METABOLIC PNL TOTAL CA: CPT

## 2025-06-10 PROCEDURE — 87086 URINE CULTURE/COLONY COUNT: CPT | Mod: GZ

## 2025-06-10 PROCEDURE — 36415 COLL VENOUS BLD VENIPUNCTURE: CPT

## 2025-06-11 ENCOUNTER — RESULTS FOLLOW-UP (OUTPATIENT)
Dept: FAMILY MEDICINE | Facility: CLINIC | Age: 85
End: 2025-06-11

## 2025-06-11 LAB — BACTERIA UR CULT: ABNORMAL

## 2025-06-29 DIAGNOSIS — M81.0 AGE-RELATED OSTEOPOROSIS WITHOUT CURRENT PATHOLOGICAL FRACTURE: ICD-10-CM

## 2025-07-01 ENCOUNTER — TELEPHONE (OUTPATIENT)
Dept: FAMILY MEDICINE | Facility: CLINIC | Age: 85
End: 2025-07-01
Payer: COMMERCIAL

## 2025-07-01 DIAGNOSIS — M81.0 AGE-RELATED OSTEOPOROSIS WITHOUT CURRENT PATHOLOGICAL FRACTURE: Primary | ICD-10-CM

## 2025-07-01 RX ORDER — ALENDRONATE SODIUM 70 MG/1
70 TABLET ORAL WEEKLY
Qty: 12 TABLET | Refills: 1 | Status: SHIPPED | OUTPATIENT
Start: 2025-07-01

## 2025-07-01 NOTE — TELEPHONE ENCOUNTER
Patient called in with a symptom update for PCP. Declined needing triage, states she just wants to update PCP and ask a medication question.     Patient states she told PCP she is taking her self off the generic fosamax due to clicking in her jaw. State she seen the dentist and they said the clicking could be related to the medication. She states they also looked at the lump in her throat that PCP noticed, and told her it does not look threatening and advised to keep an eye on it. She state she was off the fosamax for about a month, but it made no difference in her jaw clicking. She states she re-started the fosamax 2 weeks ago, and has about 1.5 months worth left at home now. She would like to know if PCP thinks taking the fosamax is more important than her jaw symptoms? Should she continue the fosamax? Did notify patient that PCP is out of office until next week so it may be a week before hearing back. She stated that is just fine. Please advise.       Janine Andres, MITCHN, RN

## 2025-07-06 NOTE — TELEPHONE ENCOUNTER
Please thank patient for the update. It might be best to get an updated DEXA scan to know how well the Fosamax is working for her. If this shows very minimal improvement we could try something new to see if this helps both her bone density and the clicking in her jaw. Orders were placed.     Stormy Taylor PA-C

## 2025-07-09 ENCOUNTER — HOSPITAL ENCOUNTER (OUTPATIENT)
Dept: BONE DENSITY | Facility: CLINIC | Age: 85
Discharge: HOME | End: 2025-07-09
Attending: PHYSICIAN ASSISTANT
Payer: COMMERCIAL

## 2025-07-09 DIAGNOSIS — M81.0 AGE-RELATED OSTEOPOROSIS WITHOUT CURRENT PATHOLOGICAL FRACTURE: ICD-10-CM

## 2025-07-09 PROCEDURE — 77081 DXA BONE DENSITY APPENDICULR: CPT

## 2025-07-14 ENCOUNTER — TELEPHONE (OUTPATIENT)
Dept: FAMILY MEDICINE | Facility: CLINIC | Age: 85
End: 2025-07-14
Payer: COMMERCIAL

## 2025-07-14 DIAGNOSIS — Z92.29 HISTORY OF BISPHOSPHONATE THERAPY: Primary | ICD-10-CM

## 2025-07-14 DIAGNOSIS — M81.0 AGE-RELATED OSTEOPOROSIS WITHOUT CURRENT PATHOLOGICAL FRACTURE: ICD-10-CM

## 2025-07-14 NOTE — TELEPHONE ENCOUNTER
Test Results    Contacts       Contact Date/Time Type Contact Phone/Fax    07/14/2025 09:56 AM CDT Phone (Incoming) Coby Damon (Self) [phone] ()            Who ordered the test:  Stormy Taylor    Type of test: Dexa    Date of test:  7/9    Where was the test performed:  [facility]    What are your questions/concerns?:  Patient would like a call to discuss the results of her dexa scan.    Could we send this information to you in [employer] or would you prefer to receive a phone call?:   Patient would prefer a phone call   Okay to leave a detailed message?: Yes at Home number on file [phone] (home)

## 2025-07-14 NOTE — TELEPHONE ENCOUNTER
Tried to call both numbers on file however this just went to voicemail. Please try again. Let patient know that her bone density scan really shows no change from her previous. This means the Fosamax is doing it's job to keep the osteoporosis from worsening however if she would like to pursue infusion therapy instead due to her jaw clicking we sure can.     Stormy Taylor PA-C

## 2025-07-15 PROBLEM — Z92.29 HISTORY OF BISPHOSPHONATE THERAPY: Status: ACTIVE | Noted: 2025-07-15

## 2025-07-15 RX ORDER — DIPHENHYDRAMINE HYDROCHLORIDE 50 MG/ML
50 INJECTION, SOLUTION INTRAMUSCULAR; INTRAVENOUS
Start: 2025-07-22

## 2025-07-15 RX ORDER — DIPHENHYDRAMINE HYDROCHLORIDE 50 MG/ML
25 INJECTION, SOLUTION INTRAMUSCULAR; INTRAVENOUS
Start: 2025-07-22

## 2025-07-15 RX ORDER — MEPERIDINE HYDROCHLORIDE 25 MG/ML
25 INJECTION INTRAMUSCULAR; INTRAVENOUS; SUBCUTANEOUS
OUTPATIENT
Start: 2025-07-22

## 2025-07-15 RX ORDER — HEPARIN SODIUM (PORCINE) LOCK FLUSH IV SOLN 100 UNIT/ML 100 UNIT/ML
5 SOLUTION INTRAVENOUS
OUTPATIENT
Start: 2025-07-22

## 2025-07-15 RX ORDER — HEPARIN SODIUM,PORCINE 10 UNIT/ML
5-20 VIAL (ML) INTRAVENOUS DAILY PRN
OUTPATIENT
Start: 2025-07-22

## 2025-07-15 RX ORDER — ALBUTEROL SULFATE 0.83 MG/ML
2.5 SOLUTION RESPIRATORY (INHALATION)
OUTPATIENT
Start: 2025-07-22

## 2025-07-15 RX ORDER — ALBUTEROL SULFATE 90 UG/1
1-2 INHALANT RESPIRATORY (INHALATION)
Start: 2025-07-22

## 2025-07-15 RX ORDER — ACETAMINOPHEN 325 MG/1
650 TABLET ORAL
OUTPATIENT
Start: 2025-07-22

## 2025-07-15 RX ORDER — ZOLEDRONIC ACID 0.05 MG/ML
5 INJECTION, SOLUTION INTRAVENOUS ONCE
Start: 2025-07-22

## 2025-07-15 RX ORDER — EPINEPHRINE 1 MG/ML
0.3 INJECTION, SOLUTION, CONCENTRATE INTRAVENOUS EVERY 5 MIN PRN
OUTPATIENT
Start: 2025-07-22

## 2025-07-15 RX ORDER — METHYLPREDNISOLONE SODIUM SUCCINATE 40 MG/ML
40 INJECTION INTRAMUSCULAR; INTRAVENOUS
Start: 2025-07-22

## 2025-07-15 NOTE — TELEPHONE ENCOUNTER
Spoke with Infusion staff, they are going to reach to patient to assist with scheduling.    Jenn Damon XRO/

## 2025-07-15 NOTE — TELEPHONE ENCOUNTER
Recommend Reclast. This is just once a year infusion. This order was placed and will be contacted by infusion center.     Stormy Taylor PA-C

## 2025-07-15 NOTE — TELEPHONE ENCOUNTER
Patient returned call and RN relayed provider update.     She is interested in the infusion therapy. Please advise.     Kevin Dorsey RN on 7/15/2025 at 8:16 AM

## 2025-07-16 RX ORDER — ALENDRONATE SODIUM 70 MG/1
70 TABLET ORAL WEEKLY
Qty: 12 TABLET | Refills: 4 | Status: SHIPPED | OUTPATIENT
Start: 2025-07-16

## 2025-07-16 NOTE — TELEPHONE ENCOUNTER
Patient spoke with Infusion Staff and she cannot have the infusion done here in [place] so she has decided to stay on the Fosamax.    She will need a refill, she will be out in a month.    Jenn Damon XRO/

## (undated) DEVICE — SYR 10ML LL W/O NDL

## (undated) DEVICE — NDL SPINAL 22GA 3.5" QUINCKE 405181

## (undated) DEVICE — SYR 10ML PERFIX LL 332152

## (undated) DEVICE — NDL ECLIPSE 18GA 1.5"

## (undated) DEVICE — TUBING EXTENSION SET MICROBORE 21CM LL 6N8374

## (undated) DEVICE — SYR 03ML LL W/O NDL

## (undated) DEVICE — NDL 27GA 1.25" 305136

## (undated) DEVICE — SYR 05ML LL W/O NDL

## (undated) DEVICE — PREP CHLORAPREP 26ML TINTED ORANGE  260815

## (undated) DEVICE — TRAY EPDRL 3.5IN 17GA 19GA PRFX BPA DEHP 332079

## (undated) DEVICE — GLOVE PROTEXIS W/NEU-THERA 7.5  2D73TE75

## (undated) DEVICE — TUBING IV EXTENSION SET 60" HI FLOW 2N3349

## (undated) DEVICE — NDL COUNTER 10CT

## (undated) DEVICE — TRAY SINGLE DOSE EPIDURAL ANESTHESIA

## (undated) DEVICE — SYR 8ML PERFIX LOSS OF RESIS 332152

## (undated) DEVICE — NDL 19GA 1.5" FILTER 305200

## (undated) DEVICE — NDL EPIDURAL TUOHY 20GA 3.5" LF DISP 183A12

## (undated) DEVICE — GLOVE BIOGEL PI ULTRATOUCH G SZ 7.5 42175

## (undated) DEVICE — NDL COUNTER 20CT 31142493

## (undated) RX ORDER — KETAMINE HCL IN 0.9 % NACL 20 MG/2 ML
SYRINGE (ML) INTRAVENOUS
Status: DISPENSED
Start: 2018-11-29

## (undated) RX ORDER — KETAMINE HCL IN 0.9 % NACL 20 MG/2 ML
SYRINGE (ML) INTRAVENOUS
Status: DISPENSED
Start: 2018-11-15

## (undated) RX ORDER — LABETALOL HYDROCHLORIDE 5 MG/ML
INJECTION, SOLUTION INTRAVENOUS
Status: DISPENSED
Start: 2018-11-29